# Patient Record
Sex: FEMALE | Race: WHITE | NOT HISPANIC OR LATINO | Employment: OTHER | URBAN - METROPOLITAN AREA
[De-identification: names, ages, dates, MRNs, and addresses within clinical notes are randomized per-mention and may not be internally consistent; named-entity substitution may affect disease eponyms.]

---

## 2017-03-24 ENCOUNTER — TELEPHONE (OUTPATIENT)
Dept: ENDOCRINOLOGY | Facility: CLINIC | Age: 77
End: 2017-03-24

## 2017-03-24 NOTE — TELEPHONE ENCOUNTER
----- Message from Alicia Durand sent at 3/24/2017  2:32 PM CDT -----  Contact: Self  Good afternoon,     Pt is requesting an appt due to annual check for June (first available 08/28/17).    Pt can be reached at [phone].    Thank you!

## 2017-06-20 ENCOUNTER — HOSPITAL ENCOUNTER (OUTPATIENT)
Dept: RADIOLOGY | Facility: HOSPITAL | Age: 77
Discharge: HOME | End: 2017-06-20
Attending: INTERNAL MEDICINE
Payer: MEDICARE

## 2017-06-20 DIAGNOSIS — R94.6 ABNORMAL THYROID FUNCTION TEST: ICD-10-CM

## 2017-06-20 DIAGNOSIS — E04.2 MULTIPLE THYROID NODULES: ICD-10-CM

## 2017-06-20 DIAGNOSIS — E87.1 HYPONATREMIA: ICD-10-CM

## 2017-06-20 PROCEDURE — 76536 US EXAM OF HEAD AND NECK: CPT | Mod: 26,,, | Performed by: RADIOLOGY

## 2017-06-20 PROCEDURE — 76536 US EXAM OF HEAD AND NECK: CPT | Mod: TC,PO

## 2017-06-22 ENCOUNTER — OFFICE VISIT (OUTPATIENT)
Dept: ENDOCRINOLOGY | Facility: CLINIC | Age: 77
End: 2017-06-22
Payer: MEDICARE

## 2017-06-22 VITALS
DIASTOLIC BLOOD PRESSURE: 72 MMHG | WEIGHT: 107.06 LBS | SYSTOLIC BLOOD PRESSURE: 116 MMHG | BODY MASS INDEX: 20.9 KG/M2 | HEART RATE: 88 BPM | RESPIRATION RATE: 18 BRPM

## 2017-06-22 DIAGNOSIS — E04.2 MULTIPLE THYROID NODULES: Primary | ICD-10-CM

## 2017-06-22 DIAGNOSIS — E87.1 HYPONATREMIA: ICD-10-CM

## 2017-06-22 PROCEDURE — 99213 OFFICE O/P EST LOW 20 MIN: CPT | Mod: S$PBB,,, | Performed by: INTERNAL MEDICINE

## 2017-06-22 PROCEDURE — 1159F MED LIST DOCD IN RCRD: CPT | Mod: ,,, | Performed by: INTERNAL MEDICINE

## 2017-06-22 PROCEDURE — 1126F AMNT PAIN NOTED NONE PRSNT: CPT | Mod: ,,, | Performed by: INTERNAL MEDICINE

## 2017-06-22 PROCEDURE — 99999 PR PBB SHADOW E&M-EST. PATIENT-LVL IV: CPT | Mod: PBBFAC,,, | Performed by: INTERNAL MEDICINE

## 2017-06-22 PROCEDURE — 99214 OFFICE O/P EST MOD 30 MIN: CPT | Mod: PBBFAC,PO | Performed by: INTERNAL MEDICINE

## 2017-06-22 RX ORDER — TIZANIDINE 4 MG/1
4 TABLET ORAL 3 TIMES DAILY PRN
Refills: 1 | COMMUNITY
Start: 2017-05-31 | End: 2018-06-28

## 2017-06-22 RX ORDER — HYDROCODONE BITARTRATE AND ACETAMINOPHEN 10; 325 MG/1; MG/1
1 TABLET ORAL 4 TIMES DAILY
Refills: 0 | COMMUNITY
Start: 2017-05-14 | End: 2018-06-28

## 2017-06-22 RX ORDER — TRAZODONE HYDROCHLORIDE 50 MG/1
100 TABLET ORAL NIGHTLY
Refills: 5 | COMMUNITY
Start: 2017-05-28 | End: 2021-01-04 | Stop reason: SDUPTHER

## 2017-06-22 RX ORDER — GABAPENTIN 300 MG/1
300 CAPSULE ORAL 3 TIMES DAILY
Refills: 5 | COMMUNITY
Start: 2017-05-27 | End: 2018-06-28

## 2017-06-27 ENCOUNTER — OFFICE VISIT (OUTPATIENT)
Dept: HEMATOLOGY/ONCOLOGY | Facility: CLINIC | Age: 77
End: 2017-06-27
Payer: MEDICARE

## 2017-06-27 VITALS
BODY MASS INDEX: 20.65 KG/M2 | HEART RATE: 81 BPM | SYSTOLIC BLOOD PRESSURE: 159 MMHG | DIASTOLIC BLOOD PRESSURE: 71 MMHG | HEIGHT: 60 IN | WEIGHT: 105.19 LBS | TEMPERATURE: 98 F | RESPIRATION RATE: 16 BRPM

## 2017-06-27 DIAGNOSIS — C85.80 MARGINAL ZONE B-CELL LYMPHOMA: Primary | ICD-10-CM

## 2017-06-27 PROCEDURE — 1125F AMNT PAIN NOTED PAIN PRSNT: CPT | Mod: ,,, | Performed by: NURSE PRACTITIONER

## 2017-06-27 PROCEDURE — 99999 PR PBB SHADOW E&M-EST. PATIENT-LVL IV: CPT | Mod: PBBFAC,,, | Performed by: NURSE PRACTITIONER

## 2017-06-27 PROCEDURE — 99213 OFFICE O/P EST LOW 20 MIN: CPT | Mod: S$PBB,,, | Performed by: NURSE PRACTITIONER

## 2017-06-27 PROCEDURE — 99214 OFFICE O/P EST MOD 30 MIN: CPT | Mod: PBBFAC,PN | Performed by: NURSE PRACTITIONER

## 2017-06-27 PROCEDURE — 1159F MED LIST DOCD IN RCRD: CPT | Mod: ,,, | Performed by: NURSE PRACTITIONER

## 2017-06-27 NOTE — PROGRESS NOTES
HISTORY OF PRESENT ILLNESS:  This is a 77-year-old white female known to   Dr. 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  She was living in [place]   at that time and received six cycles of Treanda/Rituxan, that was followed by   maintenance Rituxan.  She achieved complete response and has remained DEION.    She presents to the clinic today for her annual evaluation.  She denies any difficulties   with fevers, chills, drenching night sweats, painful lymphadenopathy, unexplained   weight loss, abdominal discomfort, nausea, vomiting, constipation, diarrhea,   fatigue, weakness, rashes, pruritus, bruising, bleeding, myalgias, confusion,   headache, fatigue, etc.  No other new complaints or pertinent findings on a 14-point   review of systems.    PHYSICAL EXAMINATION:  GENERAL:  Well-developed, well-nourished white female in no acute distress.    Alert and oriented x4.  VITAL SIGNS:  Weight 105.1 pounds, stable, /75, pulse 81, respirations 16, temperature 98.4.   HEENT:  Normocephalic, atraumatic.  Oral mucosa pink and moist.  Lips without   lesions.  Tongue midline.  Oropharynx clear.  Nonicteric sclerae.   NECK:  Supple, no adenopathy.  No carotid bruits, thyromegaly or thyroid nodule.  HEART:  Regular rate and rhythm without murmur, gallop or rub.                LUNGS:  Clear to auscultation bilaterally.  Normal respiratory effort.       ABDOMEN:  Soft, nontender, nondistended with positive normoactive bowel sounds,   no hepatosplenomegaly.    EXTREMITIES:  No cyanosis, clubbing or edema.  Distal pulses are intact.          AXILLAE AND GROIN:  No palpable pathologic lymphadenopathy is appreciated.        SKIN:  Intact/turgor normal                                                       NEUROLOGIC:  Cranial nerves II-XII grossly intact.  Motor:  Good muscle bulk and   tone.  Strength/sensory 5/5 throughout.  Gait stable.      LABORATORY:    Lab Results   Component Value Date    WBC 4.75 06/20/2017    HGB 11.9 (L) 06/20/2017    HCT 34.7 (L) 06/20/2017    MCV 96 06/20/2017     06/20/2017     Unremarkable differential    CMP  Sodium   Date Value Ref Range Status   06/20/2017 132 (L) 136 - 145 mmol/L Final   06/20/2017 133 (L) 136 - 145 mmol/L Final     Potassium   Date Value Ref Range Status   06/20/2017 4.8 3.5 - 5.1 mmol/L Final   06/20/2017 4.6 3.5 - 5.1 mmol/L Final     Chloride   Date Value Ref Range Status   06/20/2017 98 95 - 110 mmol/L Final   06/20/2017 97 95 - 110 mmol/L Final     CO2   Date Value Ref Range Status   06/20/2017 26 23 - 29 mmol/L Final   06/20/2017 25 23 - 29 mmol/L Final     Glucose   Date Value Ref Range Status   06/20/2017 88 70 - 110 mg/dL Final   06/20/2017 88 70 - 110 mg/dL Final     BUN, Bld   Date Value Ref Range Status   06/20/2017 13 8 - 23 mg/dL Final   06/20/2017 14 8 - 23 mg/dL Final     Creatinine   Date Value Ref Range Status   06/20/2017 0.8 0.5 - 1.4 mg/dL Final   06/20/2017 0.8 0.5 - 1.4 mg/dL Final     Calcium   Date Value Ref Range Status   06/20/2017 9.6 8.7 - 10.5 mg/dL Final   06/20/2017 9.7 8.7 - 10.5 mg/dL Final     Total Protein   Date Value Ref Range Status   06/20/2017 6.3 6.0 - 8.4 g/dL Final   06/20/2017 6.5 6.0 - 8.4 g/dL Final     Albumin   Date Value Ref Range Status   06/20/2017 4.4 3.5 - 5.2 g/dL Final   06/20/2017 4.5 3.5 - 5.2 g/dL Final     Total Bilirubin   Date Value Ref Range Status   06/20/2017 0.5 0.1 - 1.0 mg/dL Final     Comment:     For infants and newborns, interpretation of results should be based  on gestational age, weight and in agreement with clinical  observations.  Premature Infant recommended reference ranges:  Up to 24 hours.............<8.0 mg/dL  Up to 48 hours............<12.0 mg/dL  3-5 days..................<15.0 mg/dL  6-29 days.................<15.0 mg/dL     06/20/2017 0.6 0.1 - 1.0 mg/dL Final     Comment:     For infants and newborns,  interpretation of results should be based  on gestational age, weight and in agreement with clinical  observations.  Premature Infant recommended reference ranges:  Up to 24 hours.............<8.0 mg/dL  Up to 48 hours............<12.0 mg/dL  3-5 days..................<15.0 mg/dL  6-29 days.................<15.0 mg/dL       Alkaline Phosphatase   Date Value Ref Range Status   06/20/2017 40 (L) 55 - 135 U/L Final   06/20/2017 39 (L) 55 - 135 U/L Final     AST   Date Value Ref Range Status   06/20/2017 16 10 - 40 U/L Final   06/20/2017 16 10 - 40 U/L Final     ALT   Date Value Ref Range Status   06/20/2017 13 10 - 44 U/L Final   06/20/2017 14 10 - 44 U/L Final     Anion Gap   Date Value Ref Range Status   06/20/2017 8 8 - 16 mmol/L Final   06/20/2017 11 8 - 16 mmol/L Final     eGFR if    Date Value Ref Range Status   06/20/2017 >60 >60 mL/min/1.73 m^2 Final   06/20/2017 >60 >60 mL/min/1.73 m^2 Final     eGFR if non    Date Value Ref Range Status   06/20/2017 >60 >60 mL/min/1.73 m^2 Final     Comment:     Calculation used to obtain the estimated glomerular filtration  rate (eGFR) is the CKD-EPI equation. Since race is unknown   in our information system, the eGFR values for   -American and Non--American patients are given   for each creatinine result.     06/20/2017 >60 >60 mL/min/1.73 m^2 Final     Comment:     Calculation used to obtain the estimated glomerular filtration  rate (eGFR) is the CKD-EPI equation. Since race is unknown   in our information system, the eGFR values for   -American and Non--American patients are given   for each creatinine result.       [id number] 2.2,     IMPRESSION:  1.  Marginal zone B-cell non-Hodgkin's lymphoma - DEION.  2.  Hyponatremia, chronic  3.  Osteopenia.  4.  Hemorrhage in left eye, followed by Dr. Horne..  5.  Thyroid nodule, followed by Dr. Torrez.    PLAN:  Return in one year with interval CBC, CMP, LDH  and beta-2 microglobulin prior to appointment.      Assessment/plan reviewed and approved by Dr. Fuller.

## 2018-03-02 ENCOUNTER — TELEPHONE (OUTPATIENT)
Dept: HEMATOLOGY/ONCOLOGY | Facility: CLINIC | Age: 78
End: 2018-03-02

## 2018-06-21 ENCOUNTER — HOSPITAL ENCOUNTER (OUTPATIENT)
Dept: RADIOLOGY | Facility: HOSPITAL | Age: 78
Discharge: HOME | End: 2018-06-21
Attending: INTERNAL MEDICINE
Payer: MEDICARE

## 2018-06-21 DIAGNOSIS — E87.1 HYPONATREMIA: ICD-10-CM

## 2018-06-21 DIAGNOSIS — E04.2 MULTIPLE THYROID NODULES: ICD-10-CM

## 2018-06-21 PROCEDURE — 76536 US EXAM OF HEAD AND NECK: CPT | Mod: 26,,, | Performed by: RADIOLOGY

## 2018-06-21 PROCEDURE — 76536 US EXAM OF HEAD AND NECK: CPT | Mod: TC,PO

## 2018-06-28 ENCOUNTER — OFFICE VISIT (OUTPATIENT)
Dept: HEMATOLOGY/ONCOLOGY | Facility: CLINIC | Age: 78
End: 2018-06-28
Payer: MEDICARE

## 2018-06-28 ENCOUNTER — OFFICE VISIT (OUTPATIENT)
Dept: ENDOCRINOLOGY | Facility: CLINIC | Age: 78
End: 2018-06-28
Payer: MEDICARE

## 2018-06-28 VITALS
BODY MASS INDEX: 20.65 KG/M2 | RESPIRATION RATE: 20 BRPM | SYSTOLIC BLOOD PRESSURE: 147 MMHG | WEIGHT: 105.19 LBS | DIASTOLIC BLOOD PRESSURE: 65 MMHG | HEART RATE: 82 BPM | HEIGHT: 60 IN | TEMPERATURE: 98 F

## 2018-06-28 VITALS
DIASTOLIC BLOOD PRESSURE: 64 MMHG | SYSTOLIC BLOOD PRESSURE: 108 MMHG | WEIGHT: 105.19 LBS | HEART RATE: 88 BPM | BODY MASS INDEX: 20.65 KG/M2 | HEIGHT: 60 IN

## 2018-06-28 DIAGNOSIS — E87.5 HYPERKALEMIA: ICD-10-CM

## 2018-06-28 DIAGNOSIS — E87.1 HYPONATREMIA: ICD-10-CM

## 2018-06-28 DIAGNOSIS — R94.6 ABNORMAL THYROID FUNCTION TEST: ICD-10-CM

## 2018-06-28 DIAGNOSIS — C85.80 MARGINAL ZONE B-CELL LYMPHOMA: Primary | ICD-10-CM

## 2018-06-28 DIAGNOSIS — E04.2 MULTINODULAR GOITER: Primary | ICD-10-CM

## 2018-06-28 PROCEDURE — 99214 OFFICE O/P EST MOD 30 MIN: CPT | Mod: S$PBB,,, | Performed by: INTERNAL MEDICINE

## 2018-06-28 PROCEDURE — 99213 OFFICE O/P EST LOW 20 MIN: CPT | Mod: PBBFAC,27,PO | Performed by: INTERNAL MEDICINE

## 2018-06-28 PROCEDURE — 99214 OFFICE O/P EST MOD 30 MIN: CPT | Mod: PBBFAC,PN | Performed by: NURSE PRACTITIONER

## 2018-06-28 PROCEDURE — 99213 OFFICE O/P EST LOW 20 MIN: CPT | Mod: S$PBB,,, | Performed by: NURSE PRACTITIONER

## 2018-06-28 PROCEDURE — 99999 PR PBB SHADOW E&M-EST. PATIENT-LVL III: CPT | Mod: PBBFAC,,, | Performed by: INTERNAL MEDICINE

## 2018-06-28 PROCEDURE — 99999 PR PBB SHADOW E&M-EST. PATIENT-LVL IV: CPT | Mod: PBBFAC,,, | Performed by: NURSE PRACTITIONER

## 2018-06-28 NOTE — PATIENT INSTRUCTIONS
Hyponatremia  Hyponatremia means low sodium levels in the blood. This condition most often occurs after prolonged vomiting or diarrhea, which causes your body to lose too much water and sodium. It can also result from drinking excess amounts of water or the use of diuretics (water pills).  Mild hyponatremia causes no symptoms. It is only discovered with a blood test. As sodium levels in the blood decreases, symptoms begin to appear. This includes weakness, confusion, muscle cramping and seizures.  Home care  · Reduce your daily water intake until the problem is corrected.  · If you have been taking diuretics, you may be asked to stop taking them for a short time.  · If you are having symptoms of weakness or confusion, do not drive or operate dangerous machinery until symptoms resolve.  Follow-up care  Follow up with your healthcare provider for a repeat blood test within the next week or as advised by our staff.  When to seek medical advice  Call your healthcare provider if any of the following occur:  · Increasing weakness  · Dizziness  · Irregular heartbeat, extra beats or very fast heart rate  · Increasing confusion  · Fainting or loss of consciousness  Date Last Reviewed: 7/26/2015  © 9793-0437 [employer]. [address] [place] [address]. All rights reserved. This information is not intended as a substitute for professional medical care. Always follow your healthcare professional's instructions.

## 2018-06-28 NOTE — PROGRESS NOTES
HISTORY OF PRESENT ILLNESS:  This is a 78-year-old white female known to   Dr. 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  She was living in [place]   at that time and received six cycles of Treanda/Rituxan, that was followed by   maintenance Rituxan.  She achieved complete response and has remained DEION.    She presents to the clinic today for her annual evaluation.  She reports that   Dr. Torrez is following her for hyponatremia and her thyroid condition.  She   was instructed to withhold from free water.  She reports recent nausea at night   not related to any spicy or fatty foods.  She denies any difficulties with fevers,   chills, drenching night sweats, painful lymphadenopathy, unexplained   weight loss, abdominal discomfort, vomiting, constipation, diarrhea,   fatigue, weakness, rashes, pruritus, bruising, bleeding, myalgias, confusion,   headache, fatigue, etc.  No other new complaints or pertinent findings on a   14-point review of systems.    PHYSICAL EXAMINATION:  GENERAL:  Well-developed, well-nourished white female in no acute distress.    Alert and oriented x4.  VITAL SIGNS:   Weight:  stable  Wt Readings from Last 3 Encounters:   06/28/18 47.7 kg (105 lb 2.6 oz)   06/28/18 47.7 kg (105 lb 3.2 oz)   01/23/18 48.6 kg (107 lb 3.2 oz)     Temp Readings from Last 3 Encounters:   06/28/18 97.7 °F (36.5 °C)   01/23/18 97.4 °F (36.3 °C) (Oral)   06/27/17 98.4 °F (36.9 °C)     BP Readings from Last 3 Encounters:   06/28/18 (!) 147/65   06/28/18 108/64   01/23/18 124/86     Pulse Readings from Last 3 Encounters:   06/28/18 82   06/28/18 88   01/23/18 85     HEENT:  Normocephalic, atraumatic.  Oral mucosa pink and moist.  Lips without   lesions.  Tongue midline.  Oropharynx clear.  Nonicteric sclerae.   NECK:  Supple, no adenopathy.  No carotid bruits, thyromegaly or thyroid nodule.  HEART:  Regular rate and rhythm without  murmur, gallop or rub.                LUNGS:  Clear to auscultation bilaterally.  Normal respiratory effort.       ABDOMEN:  Soft, nontender, nondistended with positive normoactive bowel sounds,   no hepatosplenomegaly.    EXTREMITIES:  No cyanosis, clubbing or edema.  Distal pulses are intact.          AXILLAE AND GROIN:  No palpable pathologic lymphadenopathy is appreciated.        SKIN:  Intact/turgor normal                                                       NEUROLOGIC:  Cranial nerves II-XII grossly intact.  Motor:  Good muscle bulk and   tone.  Strength/sensory 5/5 throughout.  Gait stable.     LABORATORY:    Lab Results   Component Value Date    WBC 6.14 06/21/2018    HGB 12.7 06/21/2018    HCT 36.3 (L) 06/21/2018    MCV 93 06/21/2018     (H) 06/21/2018     Unremarkable differential    CMP  Sodium   Date Value Ref Range Status   06/21/2018 129 (L) 136 - 145 mmol/L Final     Potassium   Date Value Ref Range Status   06/21/2018 5.2 (H) 3.5 - 5.1 mmol/L Final     Chloride   Date Value Ref Range Status   06/21/2018 95 95 - 110 mmol/L Final     CO2   Date Value Ref Range Status   06/21/2018 23 23 - 29 mmol/L Final     Glucose   Date Value Ref Range Status   06/21/2018 87 70 - 110 mg/dL Final     BUN, Bld   Date Value Ref Range Status   06/21/2018 19 8 - 23 mg/dL Final     Creatinine   Date Value Ref Range Status   06/21/2018 0.8 0.5 - 1.4 mg/dL Final     Calcium   Date Value Ref Range Status   06/21/2018 9.9 8.7 - 10.5 mg/dL Final     Total Protein   Date Value Ref Range Status   06/21/2018 6.7 6.0 - 8.4 g/dL Final     Albumin   Date Value Ref Range Status   06/21/2018 4.4 3.5 - 5.2 g/dL Final     Total Bilirubin   Date Value Ref Range Status   06/21/2018 0.8 0.1 - 1.0 mg/dL Final     Comment:     For infants and newborns, interpretation of results should be based  on gestational age, weight and in agreement with clinical  observations.  Premature Infant recommended reference ranges:  Up to 24  hours.............<8.0 mg/dL  Up to 48 hours............<12.0 mg/dL  3-5 days..................<15.0 mg/dL  6-29 days.................<15.0 mg/dL       Alkaline Phosphatase   Date Value Ref Range Status   06/21/2018 58 55 - 135 U/L Final     AST   Date Value Ref Range Status   06/21/2018 21 10 - 40 U/L Final     ALT   Date Value Ref Range Status   06/21/2018 25 10 - 44 U/L Final     Anion Gap   Date Value Ref Range Status   06/21/2018 11 8 - 16 mmol/L Final     eGFR if    Date Value Ref Range Status   06/21/2018 >60 >60 mL/min/1.73 m^2 Final     eGFR if non    Date Value Ref Range Status   06/21/2018 >60 >60 mL/min/1.73 m^2 Final     Comment:     Calculation used to obtain the estimated glomerular filtration  rate (eGFR) is the CKD-EPI equation.        [id number] 2.0,     IMPRESSION:  1.  Marginal zone B-cell non-Hodgkin's lymphoma - DEION.  2.  Hyponatremia, chronic  3.  Osteopenia.  4.  Hemorrhage in left eye, followed by Dr. Horne.  5.  Thyroid nodule, followed by Dr. Torrez.    PLAN:  Return in one year with interval CBC, CMP, LDH and beta-2 microglobulin prior to appointment.  Handout provided on Hyponatremia.    Assessment/plan reviewed and approved by Dr. Vargas.

## 2018-06-28 NOTE — PROGRESS NOTES
CHIEF COMPLAINT: THYROID NODULE  78 year old being seen as a f/u. Had FNA 3/15 on the left that was benign. No N/V. No supplements. No biotin. Overall feeling well.           PAST MEDICAL HISTORY/PAST SURGICAL HISTORY:  Reviewed in [facility]    SOCIAL HISTORY: No Tobacco. 1 glass of wine a day    FAMILY HISTORY:  No known thyroid disease or thyroid cancer. + DM. + Heart disease    MEDICATIONS/ALLERGIES: The patient's MedCard has been updated and reviewed.      ROS:   Constitutional: No recent significant weight change  Eyes: No recent visual changes  ENT: No dysphagia  Cardiovascular: Denies current anginal symptoms  Respiratory: Denies current respiratory difficulty  Gastrointestinal: Denies recent bowel disturbances  GenitoUrinary - No dysuria  Skin: No new skin rash  Neurologic: No focal neurologic complaints  Remainder ROS negative        PE:    GENERAL: Well developed, well nourished.  PSYCH:  appropriate mood and affect  EYES:  PERRL, EOM intact.  ENT: Nares patent, oropharynx clear, mucosa pink,   NECK: Supple, trachea midline, no palpable nodules  CHEST: Resp even and unlabored, CTA bilateral.  CARDIAC: RRR, S1, S2 heard, no murmurs, rubs, S3, or S4    Results for GALINDO NGO (MRN [id number]) as of 6/28/2018 09:21   Ref. Range 6/21/2018 08:40   Sodium Latest Ref Range: 136 - 145 mmol/L 129 (L)   Potassium Latest Ref Range: 3.5 - 5.1 mmol/L 5.2 (H)   Chloride Latest Ref Range: 95 - 110 mmol/L 95   CO2 Latest Ref Range: 23 - 29 mmol/L 23   Anion Gap Latest Ref Range: 8 - 16 mmol/L 11   BUN, Bld Latest Ref Range: 8 - 23 mg/dL 19   Creatinine Latest Ref Range: 0.5 - 1.4 mg/dL 0.8   eGFR if non African American Latest Ref Range: >60 mL/min/1.73 m^2 >60   eGFR if  Latest Ref Range: >60 mL/min/1.73 m^2 >60   Glucose Latest Ref Range: 70 - 110 mg/dL 87   Calcium Latest Ref Range: 8.7 - 10.5 mg/dL 9.9   Alkaline Phosphatase Latest Ref Range: 55 - 135 U/L 58   Total Protein Latest Ref Range: 6.0 - 8.4  g/dL 6.7   Albumin Latest Ref Range: 3.5 - 5.2 g/dL 4.4   Total Bilirubin Latest Ref Range: 0.1 - 1.0 mg/dL 0.8   AST Latest Ref Range: 10 - 40 U/L 21   ALT Latest Ref Range: 10 - 44 U/L 25   LD Latest Ref Range: 110 - 260 U/L 153   TSH Latest Ref Range: 0.400 - 4.000 uIU/mL 4.777 (H)   Free T4 Latest Ref Range: 0.71 - 1.51 ng/dL 0.85       US SOFT TISSUE HEAD NECK THYROID    CLINICAL HISTORY:  Nontoxic multinodular goiter    TECHNIQUE:  Ultrasound of the thyroid and cervical lymph nodes was performed.    COMPARISON:  Thyroid ultrasound dated 06/20/2017    FINDINGS:  The thyroid gland is somewhat small but with increased vascularity.  The right lobe measures 1.5 x 0.5 x 0.8 cm with an estimated volume of 0.3 mL.  The thyroid isthmus measures 1 mm in thickness.  The left lobe measures 3.5 x 1.3 x 1.5 cm with an estimated volume of 3.8 mL.  Total thyroid volume is 4.1 mL, no significant change compared to 3.8 mL on the prior study.    The right lobe of the thyroid gland is somewhat diffusely heterogeneous in echotexture without focal solid or cystic nodule.    As previously demonstrated, there are several nodules within the left lobe.  There is a 3 mm solid nodule in the mid to upper pole which is unchanged.  There is a 1.1 x 0.7 x 0.9 cm complex solid and cystic nodule with scattered microcalcifications lateral in the midpole.  This was present previously and is unchanged.  There is also a 6 x 5 x 6 mm solid nodule with microcalcifications in the mid to lower pole.  This was present previously as well.  Several small scattered 2 mm cysts are present in the lower pole.    There are normal appearing bilateral neck lymph nodes.   Impression       1. There is no detrimental change in the appearance of the thyroid gland compared to the prior study.  Thyroid volume is small.  Heterogeneous echotexture seen in the right lobe but there is no focal solid or cystic nodule.  This is a nonspecific appearance and can be seen with  a history of thyroiditis, Graves disease, etc.  There are several nodules within the left lobe of the gland but these are unchanged in size.  There is no pathologic lymphadenopathy.           ASSESSMENT/PLAN:  1. Thyroid Nodules- She has had a benign FNA. US shows no change.     2. Hyponatremia- Na decreased. WIll have her monitor fluid intake and repeat levels in 4 weeks. See labs below.     3. Elevated TSH- will repeat thyroid levels.     FOLLOWUP  4 weeks- CMP. TSH, Ft4, Uric acid, Osm, Urine Osm, Urine Na, 8 AM ACTH, Cortisol.  F/U 6 months with TSH, CMP

## 2018-07-26 ENCOUNTER — LAB VISIT (OUTPATIENT)
Dept: LAB | Facility: HOSPITAL | Age: 78
End: 2018-07-26
Attending: INTERNAL MEDICINE
Payer: MEDICARE

## 2018-07-26 DIAGNOSIS — E04.2 MULTINODULAR GOITER: ICD-10-CM

## 2018-07-26 DIAGNOSIS — E87.1 HYPONATREMIA: ICD-10-CM

## 2018-07-26 LAB
ALBUMIN SERPL BCP-MCNC: 4 G/DL
ALP SERPL-CCNC: 60 U/L
ALT SERPL W/O P-5'-P-CCNC: 13 U/L
ANION GAP SERPL CALC-SCNC: 7 MMOL/L
AST SERPL-CCNC: 17 U/L
BILIRUB SERPL-MCNC: 0.6 MG/DL
BUN SERPL-MCNC: 13 MG/DL
CALCIUM SERPL-MCNC: 9.9 MG/DL
CHLORIDE SERPL-SCNC: 101 MMOL/L
CO2 SERPL-SCNC: 28 MMOL/L
CORTIS SERPL-MCNC: 15.2 UG/DL
CREAT SERPL-MCNC: 0.8 MG/DL
EST. GFR  (AFRICAN AMERICAN): >60 ML/MIN/1.73 M^2
EST. GFR  (NON AFRICAN AMERICAN): >60 ML/MIN/1.73 M^2
GLUCOSE SERPL-MCNC: 102 MG/DL
OSMOLALITY SERPL: 282 MOSM/KG
POTASSIUM SERPL-SCNC: 4.3 MMOL/L
PROT SERPL-MCNC: 6.6 G/DL
SODIUM SERPL-SCNC: 136 MMOL/L
T4 FREE SERPL-MCNC: 0.83 NG/DL
TSH SERPL DL<=0.005 MIU/L-ACNC: 3.3 UIU/ML
URATE SERPL-MCNC: 3.3 MG/DL

## 2018-07-26 PROCEDURE — 84550 ASSAY OF BLOOD/URIC ACID: CPT

## 2018-07-26 PROCEDURE — 82024 ASSAY OF ACTH: CPT

## 2018-07-26 PROCEDURE — 82533 TOTAL CORTISOL: CPT

## 2018-07-26 PROCEDURE — 84443 ASSAY THYROID STIM HORMONE: CPT

## 2018-07-26 PROCEDURE — 36415 COLL VENOUS BLD VENIPUNCTURE: CPT | Mod: PO

## 2018-07-26 PROCEDURE — 83930 ASSAY OF BLOOD OSMOLALITY: CPT

## 2018-07-26 PROCEDURE — 80053 COMPREHEN METABOLIC PANEL: CPT

## 2018-07-26 PROCEDURE — 84439 ASSAY OF FREE THYROXINE: CPT

## 2018-07-27 ENCOUNTER — TELEPHONE (OUTPATIENT)
Dept: ENDOCRINOLOGY | Facility: CLINIC | Age: 78
End: 2018-07-27

## 2018-07-27 DIAGNOSIS — E87.1 HYPONATREMIA: Primary | ICD-10-CM

## 2018-07-27 LAB — ACTH PLAS-MCNC: 12 PG/ML

## 2018-07-27 NOTE — TELEPHONE ENCOUNTER
----- Message from Jamil Clark sent at 7/27/2018  9:48 AM CDT -----  Contact: same  Type:  Test Results    Who Called:  patient  Name of Test (Lab/Mammo/Etc):  labs  Date of Test:  7/26/18  Ordering Provider:  Shan  Where the test was performed:  [address]  Best Call Back Number:  [phone]  Additional Information:  n/a

## 2018-07-27 NOTE — TELEPHONE ENCOUNTER
Let her know her Na is normal now. Thyroid and cortisol normal so far as well.   Check CMP 2 months

## 2018-09-28 ENCOUNTER — LAB VISIT (OUTPATIENT)
Dept: LAB | Facility: HOSPITAL | Age: 78
End: 2018-09-28
Attending: INTERNAL MEDICINE
Payer: MEDICARE

## 2018-09-28 DIAGNOSIS — E87.1 HYPONATREMIA: ICD-10-CM

## 2018-09-28 LAB
ALBUMIN SERPL BCP-MCNC: 4.2 G/DL
ALP SERPL-CCNC: 59 U/L
ALT SERPL W/O P-5'-P-CCNC: 16 U/L
ANION GAP SERPL CALC-SCNC: 10 MMOL/L
AST SERPL-CCNC: 18 U/L
BILIRUB SERPL-MCNC: 0.6 MG/DL
BUN SERPL-MCNC: 12 MG/DL
CALCIUM SERPL-MCNC: 9.6 MG/DL
CHLORIDE SERPL-SCNC: 100 MMOL/L
CO2 SERPL-SCNC: 23 MMOL/L
CREAT SERPL-MCNC: 0.8 MG/DL
EST. GFR  (AFRICAN AMERICAN): >60 ML/MIN/1.73 M^2
EST. GFR  (NON AFRICAN AMERICAN): >60 ML/MIN/1.73 M^2
GLUCOSE SERPL-MCNC: 90 MG/DL
POTASSIUM SERPL-SCNC: 4.7 MMOL/L
PROT SERPL-MCNC: 6.6 G/DL
SODIUM SERPL-SCNC: 133 MMOL/L

## 2018-09-28 PROCEDURE — 36415 COLL VENOUS BLD VENIPUNCTURE: CPT | Mod: PO

## 2018-09-28 PROCEDURE — 80053 COMPREHEN METABOLIC PANEL: CPT

## 2018-09-29 ENCOUNTER — PATIENT MESSAGE (OUTPATIENT)
Dept: ENDOCRINOLOGY | Facility: CLINIC | Age: 78
End: 2018-09-29

## 2019-01-07 ENCOUNTER — TELEPHONE (OUTPATIENT)
Dept: ENDOCRINOLOGY | Facility: CLINIC | Age: 79
End: 2019-01-07

## 2019-01-07 DIAGNOSIS — E04.1 THYROID NODULE: Primary | ICD-10-CM

## 2019-01-07 NOTE — TELEPHONE ENCOUNTER
Spoke with pt, scheduled annual follow up with labs and u/s prior, pt aware of date times and locations

## 2019-01-07 NOTE — TELEPHONE ENCOUNTER
----- Message from Emilia Hopkins sent at 1/7/2019 11:22 AM CST -----  Contact: pt  Calling to schedule appointment and be worked in to the schedule for her yearly visit in June and please advise. [phone]

## 2019-03-04 PROBLEM — K21.9 GERD WITHOUT ESOPHAGITIS: Status: ACTIVE | Noted: 2019-03-04

## 2019-03-04 PROBLEM — Z85.72 HISTORY OF NON-HODGKIN'S LYMPHOMA: Status: ACTIVE | Noted: 2019-03-04

## 2019-04-04 NOTE — TELEPHONE ENCOUNTER
Received email message given to me by Nae Kwan to call patient.  Called patient, discussed upcoming June 2018 scheduled, rescheduled June 27,2018 follow up with Jacob De La O NP to June 28, 2018 at 11:20 am. Patient voiced understanding and appreciation.   DISPLAY PLAN FREE TEXT

## 2019-06-24 ENCOUNTER — HOSPITAL ENCOUNTER (OUTPATIENT)
Dept: RADIOLOGY | Facility: HOSPITAL | Age: 79
Discharge: HOME | End: 2019-06-24
Attending: INTERNAL MEDICINE
Payer: MEDICARE

## 2019-06-24 DIAGNOSIS — E04.1 THYROID NODULE: ICD-10-CM

## 2019-06-24 PROCEDURE — 76536 US EXAM OF HEAD AND NECK: CPT | Mod: 26,,, | Performed by: RADIOLOGY

## 2019-06-24 PROCEDURE — 76536 US EXAM OF HEAD AND NECK: CPT | Mod: TC,PO

## 2019-06-24 PROCEDURE — 76536 US SOFT TISSUE HEAD NECK THYROID: ICD-10-PCS | Mod: 26,,, | Performed by: RADIOLOGY

## 2019-06-28 ENCOUNTER — OFFICE VISIT (OUTPATIENT)
Dept: HEMATOLOGY/ONCOLOGY | Facility: CLINIC | Age: 79
End: 2019-06-28
Payer: MEDICARE

## 2019-06-28 VITALS
HEIGHT: 60 IN | OXYGEN SATURATION: 98 % | WEIGHT: 99 LBS | RESPIRATION RATE: 14 BRPM | TEMPERATURE: 98 F | HEART RATE: 72 BPM | BODY MASS INDEX: 19.44 KG/M2 | SYSTOLIC BLOOD PRESSURE: 140 MMHG | DIASTOLIC BLOOD PRESSURE: 82 MMHG

## 2019-06-28 DIAGNOSIS — Z85.72 HISTORY OF NON-HODGKIN'S LYMPHOMA: Primary | ICD-10-CM

## 2019-06-28 PROCEDURE — 99213 PR OFFICE/OUTPT VISIT, EST, LEVL III, 20-29 MIN: ICD-10-PCS | Mod: S$PBB,,, | Performed by: NURSE PRACTITIONER

## 2019-06-28 PROCEDURE — 99999 PR PBB SHADOW E&M-EST. PATIENT-LVL IV: CPT | Mod: PBBFAC,,, | Performed by: NURSE PRACTITIONER

## 2019-06-28 PROCEDURE — 99213 OFFICE O/P EST LOW 20 MIN: CPT | Mod: S$PBB,,, | Performed by: NURSE PRACTITIONER

## 2019-06-28 PROCEDURE — 99214 OFFICE O/P EST MOD 30 MIN: CPT | Mod: PBBFAC,PN | Performed by: NURSE PRACTITIONER

## 2019-06-28 PROCEDURE — 99999 PR PBB SHADOW E&M-EST. PATIENT-LVL IV: ICD-10-PCS | Mod: PBBFAC,,, | Performed by: NURSE PRACTITIONER

## 2019-06-28 NOTE — PROGRESS NOTES
HISTORY OF PRESENT ILLNESS:  This is a 79-year-old white female known to   Dr. 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  She was living in [place]   at that time and received six cycles of Treanda/Rituxan, that was followed by   maintenance Rituxan.  She achieved complete response and has remained DEION.      She presents to the clinic today for her annual evaluation.  She continues to   withhold from free water in regards to hyponatremia diagnosis.  She reports   a bad stomach virus in April that resulted in nausea, diarrhea and weight loss.  She followed Dr. Horne and is finally starting to put weight back on.  She continues   to care for her  who suffers with dementia.  He is ambulatory.  She denies   any difficulties with fevers, chills, drenching night sweats, painful lymphadenopathy,   unexplained weight loss, abdominal discomfort, vomiting, constipation, diarrhea,   fatigue, weakness, rashes, pruritus, bruising, bleeding, myalgias, confusion,   headache, fatigue, etc.  No other new complaints or pertinent findings on a   14-point review of systems.    PHYSICAL EXAMINATION:  GENERAL:  Well-developed, well-nourished white female in no acute distress.    Alert and oriented x4.  VITAL SIGNS:   Weight:  Loss of 6 pounds in 1 year related to stomach virus  Wt Readings from Last 3 Encounters:   06/28/19 44.9 kg (98 lb 15.8 oz)   04/24/19 47.2 kg (104 lb)   04/10/19 47.2 kg (104 lb)     Temp Readings from Last 3 Encounters:   06/28/19 98.1 °F (36.7 °C)   03/25/19 97.4 °F (36.3 °C) (Oral)   03/04/19 97.4 °F (36.3 °C) (Oral)     BP Readings from Last 3 Encounters:   06/28/19 (!) 140/82   03/25/19 (!) 158/90   03/04/19 128/84     Pulse Readings from Last 3 Encounters:   06/28/19 72   03/25/19 66   03/04/19 68     HEENT:  Normocephalic, atraumatic.  Oral mucosa pink and moist.  Lips without   lesions.  Tongue midline.   Oropharynx clear.  Nonicteric sclerae.   NECK:  Supple, no adenopathy.  No carotid bruits, thyromegaly or thyroid nodule.  HEART:  Regular rate and rhythm without murmur, gallop or rub.                LUNGS:  Clear to auscultation bilaterally.  Normal respiratory effort.       ABDOMEN:  Soft, nontender, nondistended with positive normoactive bowel sounds,   no hepatosplenomegaly.    EXTREMITIES:  No cyanosis, clubbing or edema.  Distal pulses are intact.          AXILLAE AND GROIN:  No palpable pathologic lymphadenopathy is appreciated.        SKIN:  Intact/turgor normal                                                       NEUROLOGIC:  Cranial nerves II-XII grossly intact.  Motor:  Good muscle bulk and   tone.  Strength/sensory 5/5 throughout.  Gait stable.     LABORATORY:    Lab Results   Component Value Date    WBC 6.08 06/24/2019    HGB 11.1 (L) 06/24/2019    HCT 32.7 (L) 06/24/2019    MCV 93 06/24/2019     (H) 06/24/2019     Differential remarkable for 75.4% grans, 9.9% lymph    CMP  Sodium   Date Value Ref Range Status   06/24/2019 129 (L) 136 - 145 mmol/L Final   06/24/2019 129 (L) 136 - 145 mmol/L Final     Potassium   Date Value Ref Range Status   06/24/2019 4.2 3.5 - 5.1 mmol/L Final   06/24/2019 4.2 3.5 - 5.1 mmol/L Final     Chloride   Date Value Ref Range Status   06/24/2019 93 (L) 95 - 110 mmol/L Final   06/24/2019 93 (L) 95 - 110 mmol/L Final     CO2   Date Value Ref Range Status   06/24/2019 28 23 - 29 mmol/L Final   06/24/2019 28 23 - 29 mmol/L Final     Glucose   Date Value Ref Range Status   06/24/2019 98 70 - 110 mg/dL Final   06/24/2019 98 70 - 110 mg/dL Final     BUN, Bld   Date Value Ref Range Status   06/24/2019 17 8 - 23 mg/dL Final   06/24/2019 17 8 - 23 mg/dL Final     Creatinine   Date Value Ref Range Status   06/24/2019 0.8 0.5 - 1.4 mg/dL Final   06/24/2019 0.8 0.5 - 1.4 mg/dL Final     Calcium   Date Value Ref Range Status   06/24/2019 10.1 8.7 - 10.5 mg/dL Final   06/24/2019  10.1 8.7 - 10.5 mg/dL Final     Total Protein   Date Value Ref Range Status   06/24/2019 6.3 6.0 - 8.4 g/dL Final   06/24/2019 6.3 6.0 - 8.4 g/dL Final     Albumin   Date Value Ref Range Status   06/24/2019 4.0 3.5 - 5.2 g/dL Final   06/24/2019 4.0 3.5 - 5.2 g/dL Final     Total Bilirubin   Date Value Ref Range Status   06/24/2019 0.5 0.1 - 1.0 mg/dL Final     Comment:     For infants and newborns, interpretation of results should be based  on gestational age, weight and in agreement with clinical  observations.  Premature Infant recommended reference ranges:  Up to 24 hours.............<8.0 mg/dL  Up to 48 hours............<12.0 mg/dL  3-5 days..................<15.0 mg/dL  6-29 days.................<15.0 mg/dL     06/24/2019 0.5 0.1 - 1.0 mg/dL Final     Comment:     For infants and newborns, interpretation of results should be based  on gestational age, weight and in agreement with clinical  observations.  Premature Infant recommended reference ranges:  Up to 24 hours.............<8.0 mg/dL  Up to 48 hours............<12.0 mg/dL  3-5 days..................<15.0 mg/dL  6-29 days.................<15.0 mg/dL       Alkaline Phosphatase   Date Value Ref Range Status   06/24/2019 58 55 - 135 U/L Final   06/24/2019 58 55 - 135 U/L Final     AST   Date Value Ref Range Status   06/24/2019 16 10 - 40 U/L Final   06/24/2019 16 10 - 40 U/L Final     ALT   Date Value Ref Range Status   06/24/2019 16 10 - 44 U/L Final   06/24/2019 16 10 - 44 U/L Final     Anion Gap   Date Value Ref Range Status   06/24/2019 8 8 - 16 mmol/L Final   06/24/2019 8 8 - 16 mmol/L Final     eGFR if    Date Value Ref Range Status   06/24/2019 >60 >60 mL/min/1.73 m^2 Final   06/24/2019 >60 >60 mL/min/1.73 m^2 Final     eGFR if non    Date Value Ref Range Status   06/24/2019 >60 >60 mL/min/1.73 m^2 Final     Comment:     Calculation used to obtain the estimated glomerular filtration  rate (eGFR) is the CKD-EPI equation.       06/24/2019 >60 >60 mL/min/1.73 m^2 Final     Comment:     Calculation used to obtain the estimated glomerular filtration  rate (eGFR) is the CKD-EPI equation.        [id number] 2.2,     IMPRESSION:  1.  Marginal zone B-cell non-Hodgkin's lymphoma - DEION.  2.  Hyponatremia, chronic  3.  Osteopenia.  4.  Hemorrhage in left eye, followed by Dr. Horne.  5.  Thyroid nodule, followed by Dr. Torrez.  6.  Chronic anemia - asymptomatic    PLAN:  1.  Return in one year with interval CBC, CMP, LDH and beta-2 microglobulin prior to appointment.  2.  Follow up with Dr. Torrez - 07/01/19    Assessment/plan reviewed and approved by Dr. Fuller.

## 2019-07-01 ENCOUNTER — OFFICE VISIT (OUTPATIENT)
Dept: ENDOCRINOLOGY | Facility: CLINIC | Age: 79
End: 2019-07-01
Payer: MEDICARE

## 2019-07-01 VITALS
SYSTOLIC BLOOD PRESSURE: 108 MMHG | HEIGHT: 59 IN | DIASTOLIC BLOOD PRESSURE: 60 MMHG | BODY MASS INDEX: 19.96 KG/M2 | HEART RATE: 79 BPM | WEIGHT: 99 LBS

## 2019-07-01 DIAGNOSIS — E87.1 HYPONATREMIA: ICD-10-CM

## 2019-07-01 DIAGNOSIS — E87.1 HYPONATREMIA: Primary | ICD-10-CM

## 2019-07-01 DIAGNOSIS — E04.2 MULTINODULAR GOITER: Primary | ICD-10-CM

## 2019-07-01 DIAGNOSIS — I10 BENIGN ESSENTIAL HTN: ICD-10-CM

## 2019-07-01 PROCEDURE — 99214 PR OFFICE/OUTPT VISIT, EST, LEVL IV, 30-39 MIN: ICD-10-PCS | Mod: S$PBB,,, | Performed by: INTERNAL MEDICINE

## 2019-07-01 PROCEDURE — 99999 PR PBB SHADOW E&M-EST. PATIENT-LVL III: CPT | Mod: PBBFAC,,, | Performed by: INTERNAL MEDICINE

## 2019-07-01 PROCEDURE — 99213 OFFICE O/P EST LOW 20 MIN: CPT | Mod: PBBFAC,PO | Performed by: INTERNAL MEDICINE

## 2019-07-01 PROCEDURE — 99214 OFFICE O/P EST MOD 30 MIN: CPT | Mod: S$PBB,,, | Performed by: INTERNAL MEDICINE

## 2019-07-01 PROCEDURE — 99999 PR PBB SHADOW E&M-EST. PATIENT-LVL III: ICD-10-PCS | Mod: PBBFAC,,, | Performed by: INTERNAL MEDICINE

## 2019-07-01 RX ORDER — LOSARTAN POTASSIUM 50 MG/1
50 TABLET ORAL DAILY
Qty: 90 TABLET | Refills: 3 | Status: SHIPPED | OUTPATIENT
Start: 2019-07-01 | End: 2020-06-25

## 2019-07-01 NOTE — PROGRESS NOTES
CHIEF COMPLAINT: THYROID NODULE  79 year old being seen as a f/u. Had FNA 3/15 on the left that was benign. She is monitoring liquid intake- approx 1 liter. No N/V. No supplements. No biotin. Overall feeling well. On thiazide.           PAST MEDICAL HISTORY/PAST SURGICAL HISTORY:  Reviewed in [facility]    SOCIAL HISTORY: No Tobacco. 1 glass of wine a day    FAMILY HISTORY:  No known thyroid disease or thyroid cancer. + DM. + Heart disease    MEDICATIONS/ALLERGIES: The patient's MedCard has been updated and reviewed.      ROS:   Constitutional: No recent significant weight change  Eyes: No recent visual changes  ENT: No dysphagia  Cardiovascular: Denies current anginal symptoms  Respiratory: Denies current respiratory difficulty  Gastrointestinal: Denies recent bowel disturbances  GenitoUrinary - No dysuria  Skin: No new skin rash  Neurologic: No focal neurologic complaints  Remainder ROS negative        PE:    GENERAL: Well developed, well nourished.  ENT: Nares patent, oropharynx clear, mucosa pink,   NECK: Supple, trachea midline, no palpable nodules  CHEST: Resp even and unlabored, CTA bilateral.  CARDIAC: RRR, S1, S2 heard, no murmurs, rubs, S3, or S4    Results for GALINDO NGO (MRN [id number]) as of 7/1/2019 14:00   Ref. Range 6/24/2019 10:27   TSH Latest Ref Range: 0.400 - 4.000 uIU/mL 2.203     US SOFT TISSUE HEAD NECK THYROID    CLINICAL HISTORY:  Nontoxic single thyroid nodule    TECHNIQUE:  Ultrasound of the thyroid and cervical lymph nodes was performed.    COMPARISON:  06/21/2018    FINDINGS:  The right lobe of thyroid gland measures 1.8 x 0.7 x 0.5 cm in size.  The left lobe the thyroid gland measures 4.5 x 1.6 x 1.7cm in size.  This gives an approximate volume of on the right of 0.4cc and an approximate volume on the left of 6.4 cc for a total approximate volume of 6.8 cc.    A normal size node in the right neck is noted with normal donnell architecture of 1.0 x 0.4 x 0.5 cm.    A complex solid and  cystic nodule within the left lobe of the thyroid gland is noted of 1.1 x 1.11 x 0.8 cm without convincing detrimental change when comparison is made with the prior that is hypoechoic possibly with some coarse calcifications that appears solid and cystic but predominantly solid.  No worrisome detrimental changes noted since the prior.  A smaller well-circumscribed nodule is noted of 7 mm unchanged      Impression       1. Multiple thyroid nodules on the left without worrisome change since 06/21/2018.           ASSESSMENT/PLAN:  1. Thyroid Nodules- She has had a benign FNA. US shows no change.     2. Hyponatremia- Na decreased. Will stop thiazide diuretic.     3. Elevated TSH- TSH WNL    4. HTN- Will increase losartan to compensate for stopping thiazide. Will have her check BP at home since will be out of town a few weeks. Monitor K    FOLLOWUP  6 weeks- CMP, Osm, Uric acid, Urine Na, Urine Osm  F/U 6 months with TSH, CMP

## 2019-08-12 ENCOUNTER — LAB VISIT (OUTPATIENT)
Dept: LAB | Facility: HOSPITAL | Age: 79
End: 2019-08-12
Attending: INTERNAL MEDICINE
Payer: MEDICARE

## 2019-08-12 DIAGNOSIS — E87.1 HYPONATREMIA: ICD-10-CM

## 2019-08-12 DIAGNOSIS — E04.2 MULTINODULAR GOITER: ICD-10-CM

## 2019-08-12 LAB
ALBUMIN SERPL BCP-MCNC: 3.9 G/DL (ref 3.5–5.2)
ALP SERPL-CCNC: 61 U/L (ref 55–135)
ALT SERPL W/O P-5'-P-CCNC: 20 U/L (ref 10–44)
ANION GAP SERPL CALC-SCNC: 7 MMOL/L (ref 8–16)
AST SERPL-CCNC: 20 U/L (ref 10–40)
BILIRUB SERPL-MCNC: 0.5 MG/DL (ref 0.1–1)
BUN SERPL-MCNC: 19 MG/DL (ref 8–23)
CALCIUM SERPL-MCNC: 9.6 MG/DL (ref 8.7–10.5)
CHLORIDE SERPL-SCNC: 97 MMOL/L (ref 95–110)
CO2 SERPL-SCNC: 25 MMOL/L (ref 23–29)
CREAT SERPL-MCNC: 0.8 MG/DL (ref 0.5–1.4)
EST. GFR  (AFRICAN AMERICAN): >60 ML/MIN/1.73 M^2
EST. GFR  (NON AFRICAN AMERICAN): >60 ML/MIN/1.73 M^2
GLUCOSE SERPL-MCNC: 96 MG/DL (ref 70–110)
OSMOLALITY UR: 311 MOSM/KG (ref 50–1200)
POTASSIUM SERPL-SCNC: 5.2 MMOL/L (ref 3.5–5.1)
PROT SERPL-MCNC: 6.3 G/DL (ref 6–8.4)
SODIUM SERPL-SCNC: 129 MMOL/L (ref 136–145)
SODIUM UR-SCNC: 44 MMOL/L (ref 20–250)
URATE SERPL-MCNC: 4.4 MG/DL (ref 2.4–5.7)

## 2019-08-12 PROCEDURE — 36415 COLL VENOUS BLD VENIPUNCTURE: CPT | Mod: PO

## 2019-08-12 PROCEDURE — 83930 ASSAY OF BLOOD OSMOLALITY: CPT

## 2019-08-12 PROCEDURE — 83935 ASSAY OF URINE OSMOLALITY: CPT

## 2019-08-12 PROCEDURE — 84300 ASSAY OF URINE SODIUM: CPT

## 2019-08-12 PROCEDURE — 84550 ASSAY OF BLOOD/URIC ACID: CPT

## 2019-08-12 PROCEDURE — 80053 COMPREHEN METABOLIC PANEL: CPT

## 2019-08-13 LAB — OSMOLALITY SERPL: 278 MOSM/KG (ref 275–295)

## 2019-08-22 ENCOUNTER — TELEPHONE (OUTPATIENT)
Dept: ENDOCRINOLOGY | Facility: CLINIC | Age: 79
End: 2019-08-22

## 2019-08-22 DIAGNOSIS — E87.1 HYPONATREMIA: Primary | ICD-10-CM

## 2019-08-22 NOTE — TELEPHONE ENCOUNTER
Let her know her Na is still low. Can you see if she can increase her Na intake. Also can she keep track of her fluid intake and let us know how much fluids she drinks in a day. Sometimes too much fluid can reduce Na    Check CMP 2 weeks

## 2019-08-22 NOTE — TELEPHONE ENCOUNTER
Pt advised and verbalized understanding.  She will call in a couple of days to let us know approx how much fluids she takes in daily.  2 week labs scheduled.

## 2019-08-27 ENCOUNTER — TELEPHONE (OUTPATIENT)
Dept: ENDOCRINOLOGY | Facility: CLINIC | Age: 79
End: 2019-08-27

## 2019-08-27 DIAGNOSIS — E87.1 HYPONATREMIA: Primary | ICD-10-CM

## 2019-08-27 NOTE — TELEPHONE ENCOUNTER
----- Message from Shahana Grande sent at 8/27/2019  8:38 AM CDT -----  Contact: self  Type: Needs Medical Advice    Who Called:  Patient    Seun [phone]  Additional Information: Calling to report her fluids Per Dr--One litre plus a cup which is 8oz that's fluids through out the day.

## 2019-08-28 PROBLEM — Z87.39 HISTORY OF OSTEOPENIA: Status: ACTIVE | Noted: 2019-08-28

## 2019-08-28 PROBLEM — Z78.0 MENOPAUSE: Status: ACTIVE | Noted: 2019-08-28

## 2019-08-28 PROBLEM — M54.6 ACUTE RIGHT-SIDED THORACIC BACK PAIN: Status: ACTIVE | Noted: 2019-08-28

## 2019-08-28 PROBLEM — I10 MODERATE HYPERTENSION: Status: ACTIVE | Noted: 2019-08-28

## 2019-09-05 ENCOUNTER — LAB VISIT (OUTPATIENT)
Dept: LAB | Facility: HOSPITAL | Age: 79
End: 2019-09-05
Attending: INTERNAL MEDICINE
Payer: MEDICARE

## 2019-09-05 DIAGNOSIS — E87.1 HYPONATREMIA: ICD-10-CM

## 2019-09-05 LAB
ALBUMIN SERPL BCP-MCNC: 3.5 G/DL (ref 3.5–5.2)
ALP SERPL-CCNC: 55 U/L (ref 55–135)
ALT SERPL W/O P-5'-P-CCNC: 11 U/L (ref 10–44)
ANION GAP SERPL CALC-SCNC: 8 MMOL/L (ref 8–16)
AST SERPL-CCNC: 13 U/L (ref 10–40)
BILIRUB SERPL-MCNC: 0.2 MG/DL (ref 0.1–1)
BUN SERPL-MCNC: 14 MG/DL (ref 8–23)
CALCIUM SERPL-MCNC: 9.2 MG/DL (ref 8.7–10.5)
CHLORIDE SERPL-SCNC: 99 MMOL/L (ref 95–110)
CO2 SERPL-SCNC: 25 MMOL/L (ref 23–29)
CREAT SERPL-MCNC: 0.8 MG/DL (ref 0.5–1.4)
EST. GFR  (AFRICAN AMERICAN): >60 ML/MIN/1.73 M^2
EST. GFR  (NON AFRICAN AMERICAN): >60 ML/MIN/1.73 M^2
GLUCOSE SERPL-MCNC: 88 MG/DL (ref 70–110)
POTASSIUM SERPL-SCNC: 4.8 MMOL/L (ref 3.5–5.1)
PROT SERPL-MCNC: 5.9 G/DL (ref 6–8.4)
SODIUM SERPL-SCNC: 132 MMOL/L (ref 136–145)

## 2019-09-05 PROCEDURE — 36415 COLL VENOUS BLD VENIPUNCTURE: CPT | Mod: PO

## 2019-09-05 PROCEDURE — 80053 COMPREHEN METABOLIC PANEL: CPT

## 2019-09-08 ENCOUNTER — TELEPHONE (OUTPATIENT)
Dept: ENDOCRINOLOGY | Facility: CLINIC | Age: 79
End: 2019-09-08

## 2019-09-09 NOTE — TELEPHONE ENCOUNTER
----- Message from Azael Barrow sent at 9/9/2019  9:26 AM CDT -----  Type:  Patient Returning Call    Who Called: self   Who Left Message for Patient:  Does the patient know what this is regarding?:   Best Call Back Number:  [phone] Additional Information:

## 2019-10-02 ENCOUNTER — LAB VISIT (OUTPATIENT)
Dept: LAB | Facility: HOSPITAL | Age: 79
End: 2019-10-02
Attending: INTERNAL MEDICINE
Payer: MEDICARE

## 2019-10-02 DIAGNOSIS — E87.1 HYPONATREMIA: ICD-10-CM

## 2019-10-02 PROCEDURE — 80048 BASIC METABOLIC PNL TOTAL CA: CPT

## 2019-10-02 PROCEDURE — 36415 COLL VENOUS BLD VENIPUNCTURE: CPT | Mod: PO

## 2019-10-03 LAB
ANION GAP SERPL CALC-SCNC: 8 MMOL/L (ref 8–16)
BUN SERPL-MCNC: 22 MG/DL (ref 8–23)
CALCIUM SERPL-MCNC: 9.7 MG/DL (ref 8.7–10.5)
CHLORIDE SERPL-SCNC: 100 MMOL/L (ref 95–110)
CO2 SERPL-SCNC: 25 MMOL/L (ref 23–29)
CREAT SERPL-MCNC: 1 MG/DL (ref 0.5–1.4)
EST. GFR  (AFRICAN AMERICAN): >60 ML/MIN/1.73 M^2
EST. GFR  (NON AFRICAN AMERICAN): 53 ML/MIN/1.73 M^2
GLUCOSE SERPL-MCNC: 129 MG/DL (ref 70–110)
POTASSIUM SERPL-SCNC: 5 MMOL/L (ref 3.5–5.1)
SODIUM SERPL-SCNC: 133 MMOL/L (ref 136–145)

## 2019-10-17 ENCOUNTER — TELEPHONE (OUTPATIENT)
Dept: HEMATOLOGY/ONCOLOGY | Facility: CLINIC | Age: 79
End: 2019-10-17

## 2019-10-17 NOTE — TELEPHONE ENCOUNTER
scheduled as per patient.    ----- Message from Jacob De La O NP sent at 10/16/2019  4:11 PM CDT -----  Contact: same  Can you make her an appointment to talk about this?      ----- Message -----  From: Kendra Alexander RN  Sent: 10/16/2019   1:36 PM CDT  To: Jacob De La O NP     Could you call to speak with this patient?    Gabriella    ----- Message -----  From: Jamil Clark  Sent: 10/16/2019  12:35 PM CDT  To: Jairon James Staff (Hem/Onc)    Patient called in and stated her cancer has been in remission for over 10 years but believes she is having some of the same familiar symptoms.  Patient would like a call back to discuss this with Jacob.    Callback number is [phone]

## 2019-10-21 ENCOUNTER — TELEPHONE (OUTPATIENT)
Dept: ENDOCRINOLOGY | Facility: CLINIC | Age: 79
End: 2019-10-21

## 2019-10-22 ENCOUNTER — OFFICE VISIT (OUTPATIENT)
Dept: HEMATOLOGY/ONCOLOGY | Facility: CLINIC | Age: 79
End: 2019-10-22
Payer: MEDICARE

## 2019-10-22 ENCOUNTER — LAB VISIT (OUTPATIENT)
Dept: LAB | Facility: HOSPITAL | Age: 79
End: 2019-10-22
Attending: NURSE PRACTITIONER
Payer: MEDICARE

## 2019-10-22 VITALS
OXYGEN SATURATION: 96 % | TEMPERATURE: 98 F | HEART RATE: 78 BPM | BODY MASS INDEX: 18.62 KG/M2 | RESPIRATION RATE: 16 BRPM | SYSTOLIC BLOOD PRESSURE: 137 MMHG | HEIGHT: 60 IN | DIASTOLIC BLOOD PRESSURE: 78 MMHG | WEIGHT: 94.81 LBS

## 2019-10-22 DIAGNOSIS — Z85.72 HISTORY OF NON-HODGKIN'S LYMPHOMA: Primary | ICD-10-CM

## 2019-10-22 DIAGNOSIS — Z85.72 HISTORY OF NON-HODGKIN'S LYMPHOMA: ICD-10-CM

## 2019-10-22 LAB
ALBUMIN SERPL BCP-MCNC: 4.5 G/DL (ref 3.5–5.2)
ALP SERPL-CCNC: 56 U/L (ref 38–145)
ALT SERPL W/O P-5'-P-CCNC: 10 U/L (ref 10–44)
ANION GAP SERPL CALC-SCNC: 7 MMOL/L (ref 8–16)
AST SERPL-CCNC: 20 U/L (ref 14–36)
BASOPHILS # BLD AUTO: 0.09 K/UL (ref 0–0.2)
BASOPHILS NFR BLD: 1.2 % (ref 0–1.9)
BILIRUB SERPL-MCNC: 0.3 MG/DL (ref 0.2–1.3)
BUN SERPL-MCNC: 24 MG/DL (ref 7–18)
CALCIUM SERPL-MCNC: 9.7 MG/DL (ref 8.4–10.2)
CHLORIDE SERPL-SCNC: 102 MMOL/L (ref 95–110)
CO2 SERPL-SCNC: 25 MMOL/L (ref 22–31)
CREAT SERPL-MCNC: 0.87 MG/DL (ref 0.5–1.4)
DIFFERENTIAL METHOD: ABNORMAL
EOSINOPHIL # BLD AUTO: 0.2 K/UL (ref 0–0.5)
EOSINOPHIL NFR BLD: 2 % (ref 0–8)
ERYTHROCYTE [DISTWIDTH] IN BLOOD BY AUTOMATED COUNT: 14.2 % (ref 11.5–14.5)
EST. GFR  (AFRICAN AMERICAN): >60 ML/MIN/1.73 M^2
EST. GFR  (NON AFRICAN AMERICAN): >60 ML/MIN/1.73 M^2
GLUCOSE SERPL-MCNC: 90 MG/DL (ref 70–110)
HCT VFR BLD AUTO: 30.6 % (ref 37–48.5)
HGB BLD-MCNC: 9.7 G/DL (ref 12–16)
IMM GRANULOCYTES # BLD AUTO: 0.03 K/UL (ref 0–0.04)
IMM GRANULOCYTES NFR BLD AUTO: 0.4 % (ref 0–0.5)
LDH SERPL L TO P-CCNC: 335 U/L (ref 313–618)
LYMPHOCYTES # BLD AUTO: 1 K/UL (ref 1–4.8)
LYMPHOCYTES NFR BLD: 13.6 % (ref 18–48)
MCH RBC QN AUTO: 28.4 PG (ref 27–31)
MCHC RBC AUTO-ENTMCNC: 31.7 G/DL (ref 32–36)
MCV RBC AUTO: 90 FL (ref 82–98)
MONOCYTES # BLD AUTO: 0.6 K/UL (ref 0.3–1)
MONOCYTES NFR BLD: 8 % (ref 4–15)
NEUTROPHILS # BLD AUTO: 5.7 K/UL (ref 1.8–7.7)
NEUTROPHILS NFR BLD: 74.8 % (ref 38–73)
NRBC BLD-RTO: 0 /100 WBC
PLATELET # BLD AUTO: 406 K/UL (ref 150–350)
PMV BLD AUTO: 8.2 FL (ref 9.2–12.9)
POTASSIUM SERPL-SCNC: 5.1 MMOL/L (ref 3.5–5.1)
PROT SERPL-MCNC: 6.9 G/DL (ref 6–8.4)
RBC # BLD AUTO: 3.42 M/UL (ref 4–5.4)
SODIUM SERPL-SCNC: 134 MMOL/L (ref 136–145)
WBC # BLD AUTO: 7.62 K/UL (ref 3.9–12.7)

## 2019-10-22 PROCEDURE — 85025 COMPLETE CBC W/AUTO DIFF WBC: CPT

## 2019-10-22 PROCEDURE — 80053 COMPREHEN METABOLIC PANEL: CPT

## 2019-10-22 PROCEDURE — 85025 COMPLETE CBC W/AUTO DIFF WBC: CPT | Mod: PN

## 2019-10-22 PROCEDURE — 99214 OFFICE O/P EST MOD 30 MIN: CPT | Mod: PBBFAC,PN | Performed by: NURSE PRACTITIONER

## 2019-10-22 PROCEDURE — 83615 LACTATE (LD) (LDH) ENZYME: CPT | Mod: PN

## 2019-10-22 PROCEDURE — 99999 PR PBB SHADOW E&M-EST. PATIENT-LVL IV: ICD-10-PCS | Mod: PBBFAC,,, | Performed by: NURSE PRACTITIONER

## 2019-10-22 PROCEDURE — 80053 COMPREHEN METABOLIC PANEL: CPT | Mod: PN

## 2019-10-22 PROCEDURE — 99213 OFFICE O/P EST LOW 20 MIN: CPT | Mod: S$PBB,,, | Performed by: NURSE PRACTITIONER

## 2019-10-22 PROCEDURE — 83615 LACTATE (LD) (LDH) ENZYME: CPT

## 2019-10-22 PROCEDURE — 99213 PR OFFICE/OUTPT VISIT, EST, LEVL III, 20-29 MIN: ICD-10-PCS | Mod: S$PBB,,, | Performed by: NURSE PRACTITIONER

## 2019-10-22 PROCEDURE — 99999 PR PBB SHADOW E&M-EST. PATIENT-LVL IV: CPT | Mod: PBBFAC,,, | Performed by: NURSE PRACTITIONER

## 2019-10-22 PROCEDURE — 36415 COLL VENOUS BLD VENIPUNCTURE: CPT | Mod: PN

## 2019-10-22 PROCEDURE — 82232 ASSAY OF BETA-2 PROTEIN: CPT

## 2019-10-22 RX ORDER — GLUCOSAMINE/CHONDRO SU A 500-400 MG
1 TABLET ORAL 2 TIMES DAILY
COMMUNITY
End: 2021-01-04 | Stop reason: SDUPTHER

## 2019-10-22 NOTE — PROGRESS NOTES
HISTORY OF PRESENT ILLNESS:  This is a 79-year-old white female known to Dr. Fuller for Stage IV marginal zone B-cell non-Hodgkin'S lymphoma involving the bone marrow that was diagnosed in March 2009.  She was living in [place] at that time and received six cycles of Treanda/Rituxan, that was followed by maintenance Rituxan.  She achieved complete response and has remained DEION.  Her last clinic visit was 06/28/2019.    She presents to the clinic today earlier than scheduled with complaints of weight loss (no change in diet, eating) & night perspiration.  She is concerned as she had these vague symptoms on her initial diagnosis.  She continues to withhold from free water in regards to hyponatremia diagnosis and follows with Dr. Torrez.  She denies any difficulties with fevers, chills, painful lymphadenopathy, abdominal discomfort, vomiting, constipation, diarrhea, fatigue, weakness, rashes, pruritus, bruising, bleeding, myalgias, confusion, headache, etc.  No other new complaints or pertinent findings on a 14-point review of systems.    PHYSICAL EXAMINATION:  GENERAL:  Well-developed, well-nourished white female in no acute distress.  Alert and oriented x4.  VITAL SIGNS:   Weight:  Loss of 4 pounds in 4 months  Wt Readings from Last 3 Encounters:   10/22/19 43 kg (94 lb 12.8 oz)   08/28/19 44.9 kg (98 lb 14.4 oz)   07/01/19 44.9 kg (98 lb 15.8 oz)     Temp Readings from Last 3 Encounters:   10/22/19 98.2 °F (36.8 °C) (Oral)   08/28/19 98.1 °F (36.7 °C) (Oral)   06/28/19 98.1 °F (36.7 °C)     BP Readings from Last 3 Encounters:   10/22/19 137/78   08/28/19 126/86   07/01/19 108/60     Pulse Readings from Last 3 Encounters:   10/22/19 78   08/28/19 97   07/01/19 79     HEENT:  Normocephalic, atraumatic.  Oral mucosa pink and moist.  Lips without   lesions.  Tongue midline.  Oropharynx clear.  Nonicteric sclerae.   NECK:  Supple, no adenopathy.  No carotid bruits, thyromegaly or thyroid nodule.  HEART:  Regular  rate and rhythm without murmur, gallop or rub.                LUNGS:  Clear to auscultation bilaterally.  Normal respiratory effort.       ABDOMEN:  Soft, nontender, nondistended with positive normoactive bowel sounds,   no hepatosplenomegaly.    EXTREMITIES:  No cyanosis, clubbing or edema.  Distal pulses are intact.          AXILLAE AND GROIN:  No palpable pathologic lymphadenopathy is appreciated.        SKIN:  Intact/turgor normal                                                       NEUROLOGIC:  Cranial nerves II-XII grossly intact.  Motor:  Good muscle bulk and   tone.  Strength/sensory 5/5 throughout.  Gait stable.     LABORATORY:  To be drawn post visit:  CBC, CMP, LDH, BETA2    IMPRESSION:  1.  Marginal zone B-cell non-Hodgkin's lymphoma - symptomatic  2.  Hyponatremia, chronic - follows Dr. Torrez  3.  Osteopenia.  4.  Hemorrhage in left eye, followed by Dr. Horne.  5.  Thyroid nodule, followed by Dr. Torrez.  6.  Chronic anemia - asymptomatic     PLAN:  1.  Draw labs post visit:  CBC, CMP, LDH, [id number].  2.  Arrange for CT PET - follow up with Dr. Fuller for results.  3.  Discussion with Dr. Fuller:  No additional orders at present.    Assessment/plan reviewed and approved by Dr. Fuller.

## 2019-10-23 LAB — B2 MICROGLOB SERPL-MCNC: 2.8 UG/ML (ref 0–2.5)

## 2019-10-29 ENCOUNTER — HOSPITAL ENCOUNTER (OUTPATIENT)
Dept: RADIOLOGY | Facility: HOSPITAL | Age: 79
Discharge: HOME | End: 2019-10-29
Attending: NURSE PRACTITIONER
Payer: MEDICARE

## 2019-10-29 DIAGNOSIS — Z85.72 HISTORY OF NON-HODGKIN'S LYMPHOMA: ICD-10-CM

## 2019-10-29 PROCEDURE — A9552 F18 FDG: HCPCS | Mod: PO

## 2019-10-29 PROCEDURE — 78815 PET IMAGE W/CT SKULL-THIGH: CPT | Mod: 26,PS,, | Performed by: RADIOLOGY

## 2019-10-29 PROCEDURE — 78815 NM PET CT ROUTINE: ICD-10-PCS | Mod: 26,PS,, | Performed by: RADIOLOGY

## 2019-10-29 PROCEDURE — 78815 PET IMAGE W/CT SKULL-THIGH: CPT | Mod: TC,PO

## 2019-11-05 ENCOUNTER — OFFICE VISIT (OUTPATIENT)
Dept: HEMATOLOGY/ONCOLOGY | Facility: CLINIC | Age: 79
End: 2019-11-05
Payer: MEDICARE

## 2019-11-05 ENCOUNTER — LAB VISIT (OUTPATIENT)
Dept: LAB | Facility: HOSPITAL | Age: 79
End: 2019-11-05
Attending: INTERNAL MEDICINE
Payer: MEDICARE

## 2019-11-05 VITALS
HEIGHT: 60 IN | SYSTOLIC BLOOD PRESSURE: 139 MMHG | OXYGEN SATURATION: 98 % | TEMPERATURE: 98 F | WEIGHT: 95.25 LBS | RESPIRATION RATE: 16 BRPM | BODY MASS INDEX: 18.7 KG/M2 | DIASTOLIC BLOOD PRESSURE: 87 MMHG | HEART RATE: 117 BPM

## 2019-11-05 DIAGNOSIS — R61 NIGHT SWEATS: ICD-10-CM

## 2019-11-05 DIAGNOSIS — R63.4 UNEXPLAINED WEIGHT LOSS: ICD-10-CM

## 2019-11-05 DIAGNOSIS — D64.9 NORMOCYTIC ANEMIA: ICD-10-CM

## 2019-11-05 DIAGNOSIS — C85.80 MARGINAL ZONE B-CELL LYMPHOMA: Primary | ICD-10-CM

## 2019-11-05 DIAGNOSIS — C85.80 MARGINAL ZONE B-CELL LYMPHOMA: ICD-10-CM

## 2019-11-05 LAB
FERRITIN SERPL-MCNC: 6 NG/ML (ref 12–264)
FOLATE SERPL-MCNC: >20 NG/ML (ref 4–24)
HAPTOGLOB SERPL-MCNC: 168 MG/DL (ref 30–200)
IRON SATN MFR SERPL: 23 % (ref 20–50)
IRON SERPL-MCNC: 93 UG/DL (ref 30–160)
TOTAL IRON BINDING CAPACITY: 396 UG/DL (ref 265–497)
VIT B12 SERPL-MCNC: >1000 PG/ML (ref 210–950)

## 2019-11-05 PROCEDURE — 99214 PR OFFICE/OUTPT VISIT, EST, LEVL IV, 30-39 MIN: ICD-10-PCS | Mod: S$PBB,,, | Performed by: INTERNAL MEDICINE

## 2019-11-05 PROCEDURE — 82728 ASSAY OF FERRITIN: CPT | Mod: PN

## 2019-11-05 PROCEDURE — 82607 VITAMIN B-12: CPT

## 2019-11-05 PROCEDURE — 99999 PR PBB SHADOW E&M-EST. PATIENT-LVL V: ICD-10-PCS | Mod: PBBFAC,,, | Performed by: INTERNAL MEDICINE

## 2019-11-05 PROCEDURE — 82270 OCCULT BLOOD FECES: CPT | Mod: PBBFAC,PN | Performed by: INTERNAL MEDICINE

## 2019-11-05 PROCEDURE — 83010 ASSAY OF HAPTOGLOBIN QUANT: CPT

## 2019-11-05 PROCEDURE — 83540 ASSAY OF IRON: CPT

## 2019-11-05 PROCEDURE — 82746 ASSAY OF FOLIC ACID SERUM: CPT | Mod: PN

## 2019-11-05 PROCEDURE — 99215 OFFICE O/P EST HI 40 MIN: CPT | Mod: PBBFAC,PN | Performed by: INTERNAL MEDICINE

## 2019-11-05 PROCEDURE — 82728 ASSAY OF FERRITIN: CPT

## 2019-11-05 PROCEDURE — 99214 OFFICE O/P EST MOD 30 MIN: CPT | Mod: S$PBB,,, | Performed by: INTERNAL MEDICINE

## 2019-11-05 PROCEDURE — 83540 ASSAY OF IRON: CPT | Mod: PN

## 2019-11-05 PROCEDURE — 83010 ASSAY OF HAPTOGLOBIN QUANT: CPT | Mod: PN

## 2019-11-05 PROCEDURE — 82607 VITAMIN B-12: CPT | Mod: PN

## 2019-11-05 PROCEDURE — 99999 PR PBB SHADOW E&M-EST. PATIENT-LVL V: CPT | Mod: PBBFAC,,, | Performed by: INTERNAL MEDICINE

## 2019-11-05 PROCEDURE — 82746 ASSAY OF FOLIC ACID SERUM: CPT

## 2019-11-05 NOTE — PROGRESS NOTES
History of present illness:  The patient is an 79-year-old white female who relocated here from [place].  She was recently seen by Jacob De La O in order to establish care in our clinic.  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  She was diagnosed in 2009, treated with Treanda/Rituxan, attained remission, and completed 2 years of maintenance therapy with Rituxan.  She has remained without evidence of disease recurrence.  At the time of her clinic appointment she had complaints of a 10 lb weight loss over the summer which she could not explain.  She was also experiencing sweats.  She denies lymphadenopathy and pruritus.  She has not experience fever.  She denies any signs or symptoms of GI bleeding. No other complaints pertinent findings on a 14 point review of systems.  Patient returns to review results of interval lab and imaging for restaging.    Physical examination:  The patient is a well-developed, thin appearing, elderly, white female in no acute distress, who has a weight of 95.5 lb (increased by 1 lb).  VITAL SIGNS: Documented  and reviewed this visit.  HEENT: Normocephalic, atraumatic. Oral mucosa pink and moist. Lips without   lesions. Tongue midline. Oropharynx clear. Nonicteric sclerae.   NECK: Supple, no adenopathy. No carotid bruits, thyromegaly or thyroid nodule.   HEART: Regular rate and rhythm without murmur, gallop or rub.   LUNGS: Clear to auscultation bilaterally. Normal respiratory effort.   ABDOMEN: Soft, nontender, nondistended with positive normoactive bowel sounds,   no hepatosplenomegaly.   EXTREMITIES: No cyanosis, clubbing or edema. Distal pulses are intact.   AXILLAE AND GROIN: No palpable pathologic lymphadenopathy is appreciated.   SKIN: Intact/turgor normal   NEUROLOGIC: Cranial nerves II-XII grossly intact. Motor: Good muscle bulk and   tone. Strength/sensory 5/5 throughout. Gait stable.     Laboratory:  White  count 7.6, hemoglobin 9.7, hematocrit 30.6, MCV 90, RDW 14.2, platelets 406, absolute neutrophil count is 5700.  Sodium 134, potassium 5.1, chloride 102, CO2 25, BUN 24, creatinine 0.9, glucose 90, calcium 9.7, liver function test within normal limits, , GFR is greater than 60.  Beta 2 microglobulin 2.8.    CT PET:  No PET-CT imaging findings to suggest significant recurrent or residual neoplastic disease.    Impression:  1.  History of marginal zone B-cell non-Hodgkin's lymphoma.  2.  New onset normocytic anemia.  3.  Unexplained weight loss.  4.  Night sweats.    Plan:  1.  Obtain iron, TIBC, ferritin, soluble transferrin receptor, B12, folate, haptoglobin, and stool cards.  2.  Patient is to return to clinic to review results of workup at earliest convenience.  3.  If no specific cause for her anemia is noted on this noninvasive workup, I would recommend proceeding with bilateral bone marrow aspiration and biopsy to include flow and cytogenetic analysis in order to rule out recurrence of lymphoma.  4.  40 min of contact time spent counseling the patient regarding plan of care.  She voices understanding wishes to proceed as above.    This note was created using voice recognition software and may contain grammatical errors.

## 2019-11-07 LAB — STFR SERPL-MCNC: 3.2 MG/L (ref 1.8–4.6)

## 2019-11-20 ENCOUNTER — OFFICE VISIT (OUTPATIENT)
Dept: HEMATOLOGY/ONCOLOGY | Facility: CLINIC | Age: 79
End: 2019-11-20
Payer: MEDICARE

## 2019-11-20 VITALS
HEIGHT: 60 IN | HEART RATE: 122 BPM | TEMPERATURE: 98 F | WEIGHT: 89.06 LBS | BODY MASS INDEX: 17.49 KG/M2 | OXYGEN SATURATION: 99 % | SYSTOLIC BLOOD PRESSURE: 127 MMHG | DIASTOLIC BLOOD PRESSURE: 66 MMHG | RESPIRATION RATE: 16 BRPM

## 2019-11-20 DIAGNOSIS — R61 NIGHT SWEATS: ICD-10-CM

## 2019-11-20 DIAGNOSIS — R11.2 NON-INTRACTABLE VOMITING WITH NAUSEA, UNSPECIFIED VOMITING TYPE: ICD-10-CM

## 2019-11-20 DIAGNOSIS — R10.10 PAIN OF UPPER ABDOMEN: ICD-10-CM

## 2019-11-20 DIAGNOSIS — D64.9 NORMOCYTIC ANEMIA: ICD-10-CM

## 2019-11-20 DIAGNOSIS — R10.13 DYSPEPSIA: Primary | ICD-10-CM

## 2019-11-20 DIAGNOSIS — R63.4 UNEXPLAINED WEIGHT LOSS: ICD-10-CM

## 2019-11-20 DIAGNOSIS — C85.80 MARGINAL ZONE B-CELL LYMPHOMA: Primary | ICD-10-CM

## 2019-11-20 DIAGNOSIS — C85.80 MARGINAL ZONE B-CELL LYMPHOMA: ICD-10-CM

## 2019-11-20 LAB
CTP QC/QA: YES
FECAL OCCULT BLOOD, POC: NEGATIVE

## 2019-11-20 PROCEDURE — 1125F AMNT PAIN NOTED PAIN PRSNT: CPT | Mod: ,,, | Performed by: INTERNAL MEDICINE

## 2019-11-20 PROCEDURE — 1159F PR MEDICATION LIST DOCUMENTED IN MEDICAL RECORD: ICD-10-PCS | Mod: ,,, | Performed by: INTERNAL MEDICINE

## 2019-11-20 PROCEDURE — 1159F MED LIST DOCD IN RCRD: CPT | Mod: ,,, | Performed by: INTERNAL MEDICINE

## 2019-11-20 PROCEDURE — 99999 PR PBB SHADOW E&M-EST. PATIENT-LVL III: CPT | Mod: PBBFAC,,, | Performed by: INTERNAL MEDICINE

## 2019-11-20 PROCEDURE — 1125F PR PAIN SEVERITY QUANTIFIED, PAIN PRESENT: ICD-10-PCS | Mod: ,,, | Performed by: INTERNAL MEDICINE

## 2019-11-20 PROCEDURE — 99214 OFFICE O/P EST MOD 30 MIN: CPT | Mod: S$PBB,,, | Performed by: INTERNAL MEDICINE

## 2019-11-20 PROCEDURE — 99214 PR OFFICE/OUTPT VISIT, EST, LEVL IV, 30-39 MIN: ICD-10-PCS | Mod: S$PBB,,, | Performed by: INTERNAL MEDICINE

## 2019-11-20 PROCEDURE — 99999 PR PBB SHADOW E&M-EST. PATIENT-LVL III: ICD-10-PCS | Mod: PBBFAC,,, | Performed by: INTERNAL MEDICINE

## 2019-11-20 PROCEDURE — 99213 OFFICE O/P EST LOW 20 MIN: CPT | Mod: PBBFAC,PN | Performed by: INTERNAL MEDICINE

## 2019-11-20 RX ORDER — OMEPRAZOLE 40 MG/1
40 CAPSULE, DELAYED RELEASE ORAL DAILY
Qty: 90 CAPSULE | Refills: 3 | Status: SHIPPED | OUTPATIENT
Start: 2019-11-20 | End: 2021-01-04

## 2019-11-20 RX ORDER — PROCHLORPERAZINE MALEATE 10 MG
10 TABLET ORAL EVERY 6 HOURS PRN
Qty: 60 TABLET | Refills: 3 | Status: SHIPPED | OUTPATIENT
Start: 2019-11-20 | End: 2020-05-26 | Stop reason: SDUPTHER

## 2019-11-20 NOTE — PROGRESS NOTES
History of present illness:  The patient is an 79-year-old white female who relocated here from [place].  She was recently seen by Jacob De La O in order to establish care in our clinic.  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  She was diagnosed in 2009, treated with Treanda/Rituxan, attained remission, and completed 2 years of maintenance therapy with Rituxan.  She has remained without evidence of disease recurrence.  At the time of her clinic appointment she had complaints of a 10 lb weight loss over the summer which she could not explain.  She was also experiencing sweats.  She denies lymphadenopathy and pruritus.  She has not experience fever.  She denies any signs or symptoms of GI bleeding. Interval CT PET was negative.  Patient returns to clinic to review laboratory workup regarding worsening anemia.  She has new complaint of 2 week history of abdominal pain, nausea, emesis, and diminished oral intake, as well as continued weight loss.  No other complaints pertinent findings on a 14 point review of systems.  Patient returns to review results of interval lab and imaging for restaging.    Physical examination:  The patient is a well-developed, thin appearing, elderly, white female in no acute distress, who has a weight of 89 lb (increased by 6.5 lb).  VITAL SIGNS: Documented  and reviewed this visit.  HEENT: Normocephalic, atraumatic. Oral mucosa pink and moist. Lips without   lesions. Tongue midline. Oropharynx clear. Nonicteric sclerae.   NECK: Supple, no adenopathy. No carotid bruits, thyromegaly or thyroid nodule.   HEART: Regular rate and rhythm without murmur, gallop or rub.   LUNGS: Clear to auscultation bilaterally. Normal respiratory effort.   ABDOMEN: Soft, nontender, nondistended with positive normoactive bowel sounds,   no hepatosplenomegaly.   EXTREMITIES: No cyanosis, clubbing or edema. Distal pulses are intact.   AXILLAE  AND GROIN: No palpable pathologic lymphadenopathy is appreciated.   SKIN: Intact/turgor normal   NEUROLOGIC: Cranial nerves II-XII grossly intact. Motor: Good muscle bulk and   tone. Strength/sensory 5/5 throughout. Gait stable.     Laboratory:  Ferritin 6, serum iron 93, TIBC 396, percent saturated iron 23, soluble transferrin receptor 3.2.  Folate greater than 20, B12 greater than 1000.  Haptoglobin 168.  Stools for occult blood are negative x3.    Impression:  1.  History of marginal zone B-cell non-Hodgkin's lymphoma.  2.  New onset normocytic anemia.  3.  Unexplained weight loss.  4.  Night sweats.  5.  Abdominal pain in association with nausea emesis.    Plan:  1.  Start Prilosec 40 mg daily and Compazine 10 mg every 6 hr as needed for nausea emesis.  2.  Consult Dr. Soriano for EGD.  3.  Obtain bilateral bone marrow aspiration and biopsy with flow and cytogenetic analysis.  4.  Informed consent for bone marrow aspiration and biopsy was obtained during the course of today's office evaluation.  5.  Patient is to return to clinic to review results at earliest convenience and will have interval lab to include CBC, CMP, LDH, magnesium, beta 2 microglobulin in association with an appointment..    This note was created using voice recognition software and may contain grammatical errors.

## 2019-11-25 ENCOUNTER — OFFICE VISIT (OUTPATIENT)
Dept: GASTROENTEROLOGY | Facility: CLINIC | Age: 79
End: 2019-11-25
Payer: MEDICARE

## 2019-11-25 VITALS — BODY MASS INDEX: 17.57 KG/M2 | HEIGHT: 60 IN | RESPIRATION RATE: 19 BRPM | WEIGHT: 89.5 LBS

## 2019-11-25 DIAGNOSIS — Z85.72 HISTORY OF LYMPHOMA: ICD-10-CM

## 2019-11-25 DIAGNOSIS — R19.7 DIARRHEA, UNSPECIFIED TYPE: ICD-10-CM

## 2019-11-25 DIAGNOSIS — R10.13 DYSPEPSIA: Primary | ICD-10-CM

## 2019-11-25 DIAGNOSIS — R11.0 NAUSEA: ICD-10-CM

## 2019-11-25 DIAGNOSIS — R63.4 WEIGHT LOSS: ICD-10-CM

## 2019-11-25 DIAGNOSIS — R10.84 GENERALIZED ABDOMINAL PAIN: ICD-10-CM

## 2019-11-25 DIAGNOSIS — D50.9 IRON DEFICIENCY ANEMIA, UNSPECIFIED IRON DEFICIENCY ANEMIA TYPE: ICD-10-CM

## 2019-11-25 PROCEDURE — 1125F PR PAIN SEVERITY QUANTIFIED, PAIN PRESENT: ICD-10-PCS | Mod: ,,, | Performed by: NURSE PRACTITIONER

## 2019-11-25 PROCEDURE — 99214 OFFICE O/P EST MOD 30 MIN: CPT | Mod: S$PBB,,, | Performed by: NURSE PRACTITIONER

## 2019-11-25 PROCEDURE — 99214 OFFICE O/P EST MOD 30 MIN: CPT | Mod: PBBFAC,PO | Performed by: NURSE PRACTITIONER

## 2019-11-25 PROCEDURE — 1159F MED LIST DOCD IN RCRD: CPT | Mod: ,,, | Performed by: NURSE PRACTITIONER

## 2019-11-25 PROCEDURE — 99999 PR PBB SHADOW E&M-EST. PATIENT-LVL IV: CPT | Mod: PBBFAC,,, | Performed by: NURSE PRACTITIONER

## 2019-11-25 PROCEDURE — 99214 PR OFFICE/OUTPT VISIT, EST, LEVL IV, 30-39 MIN: ICD-10-PCS | Mod: S$PBB,,, | Performed by: NURSE PRACTITIONER

## 2019-11-25 PROCEDURE — 1125F AMNT PAIN NOTED PAIN PRSNT: CPT | Mod: ,,, | Performed by: NURSE PRACTITIONER

## 2019-11-25 PROCEDURE — 1159F PR MEDICATION LIST DOCUMENTED IN MEDICAL RECORD: ICD-10-PCS | Mod: ,,, | Performed by: NURSE PRACTITIONER

## 2019-11-25 PROCEDURE — 99999 PR PBB SHADOW E&M-EST. PATIENT-LVL IV: ICD-10-PCS | Mod: PBBFAC,,, | Performed by: NURSE PRACTITIONER

## 2019-11-25 RX ORDER — POLYETHYLENE GLYCOL 3350 17 G/17G
POWDER, FOR SOLUTION ORAL DAILY PRN
COMMUNITY
End: 2022-09-06

## 2019-11-25 RX ORDER — BUTYROSPERMUM PARKII(SHEA BUTTER), SIMMONDSIA CHINENSIS (JOJOBA) SEED OIL, ALOE BARBADENSIS LEAF EXTRACT .01; 1; 3.5 G/100G; G/100G; G/100G
250 LIQUID TOPICAL 2 TIMES DAILY
COMMUNITY
End: 2021-01-04

## 2019-11-25 NOTE — PROGRESS NOTES
Subjective:       Patient ID: Anabell Madrid is a 79 y.o. female Body mass index is 17.48 kg/m².    Chief Complaint: Abdominal Pain (nausea, anemia)    This patient is new to me.  Referring Provider: Dr. Guevara Fuller for dyspepsia & EGD.     Abdominal Pain   This is a new problem. Episode onset: started a few weeks ago (in 11/2019) The problem occurs constantly. The problem has been gradually improving. The pain is located in the generalized abdominal region. The quality of the pain is cramping. The abdominal pain does not radiate. Associated symptoms include diarrhea (started with abdominal cramping; bowel movements are 3 times a day of watery stool; denies recent antibiotic/hospitalizaion, foreign travel, ill contacts, or suspect food intake), nausea (occasional; improving since on compazine prn- helping; started medical marijuana orally BID in 9/2019 for neck pain), vomiting (emesis, improving since on compazine; emesis was of sputum and food; denies bright red blood or coffee ground emesis) and weight loss (lost 15 lbs since summer 2019 due to norco use and constipation she had with that and was not eating as much because she felt blaoted at that time). Pertinent negatives include no belching, constipation (has resolved since not taking norco daily, miralax prn- has not taken recently), dysuria, fever, flatus, frequency, hematochezia or melena. Associated symptoms comments: Anemia since ~2017 but worsened in 10/2019; having bone marrow biopsy next week with Dr. Fuller. Treatments tried: prilosec 40 mg once daily, probiotic bid. Her past medical history is significant for GERD (denies any recently). There is no history of Crohn's disease, gallstones, pancreatitis or ulcerative colitis.     Review of Systems   Constitutional: Positive for appetite change (trying gluten free diet; decreased; eating oatmeal brekafast, lunch soup, dinner eats meat and vegetables; decreased food intake), diaphoresis (night  sweats- seeing Dr. Fuller for this), fatigue and weight loss (lost 15 lbs since summer 2019 due to norco use and constipation she had with that and was not eating as much because she felt blaoted at that time). Negative for chills and fever.        Taking diclofenac BID & norco PRN- takes once every 2-3 days   HENT: Negative for sore throat and trouble swallowing.    Respiratory: Negative for cough, choking and shortness of breath.    Cardiovascular: Negative for chest pain.   Gastrointestinal: Positive for abdominal pain, diarrhea (started with abdominal cramping; bowel movements are 3 times a day of watery stool; denies recent antibiotic/hospitalizaion, foreign travel, ill contacts, or suspect food intake), nausea (occasional; improving since on compazine prn- helping; started medical marijuana orally BID in 9/2019 for neck pain) and vomiting (emesis, improving since on compazine; emesis was of sputum and food; denies bright red blood or coffee ground emesis). Negative for anal bleeding, blood in stool, constipation (has resolved since not taking norco daily, miralax prn- has not taken recently), flatus, hematochezia, melena and rectal pain.   Genitourinary: Negative for difficulty urinating, dysuria, flank pain and frequency.   Neurological: Negative for weakness.       No LMP recorded. Patient is postmenopausal.  Past Medical History:   Diagnosis Date    Anemia     Cancer 2009    non-hodkins lymphoma-remission 5 yrs in 4/14    Eye hemorrhage     12/2015    Osteoporosis     Sleep apnea     uses dental mouthpiece, no cpap/bipap     Past Surgical History:   Procedure Laterality Date    COLONOSCOPY  ~2004    normal findings per patient report    DILATION AND CURETTAGE OF UTERUS      TONSILLECTOMY       Family History   Problem Relation Age of Onset    Diabetes Mother     Heart failure Mother     Cancer Mother         lymphoma non-hodkins    Heart attack Father     Diabetes Brother     Cancer Maternal  Aunt         bladder cancer    Cancer Maternal Uncle     Diabetes Maternal Uncle     Diabetes Maternal Grandmother     Cancer Maternal Grandfather         prostate cancer    Colon cancer Neg Hx     Crohn's disease Neg Hx     Esophageal cancer Neg Hx     Stomach cancer Neg Hx     Ulcerative colitis Neg Hx      Wt Readings from Last 10 Encounters:   11/25/19 40.6 kg (89 lb 8.1 oz)   11/20/19 40.4 kg (89 lb 1.1 oz)   11/05/19 43.2 kg (95 lb 3.8 oz)   10/22/19 43 kg (94 lb 12.8 oz)   08/28/19 44.9 kg (98 lb 14.4 oz)   07/01/19 44.9 kg (98 lb 15.8 oz)   06/28/19 44.9 kg (98 lb 15.8 oz)   04/24/19 47.2 kg (104 lb)   04/10/19 47.2 kg (104 lb)   03/25/19 47.2 kg (104 lb)     Lab Results   Component Value Date    WBC 7.62 10/22/2019    HGB 9.7 (L) 10/22/2019    HCT 30.6 (L) 10/22/2019    MCV 90 10/22/2019     (H) 10/22/2019     Lab Results   Component Value Date    IRON 93 11/05/2019    TIBC 396 11/05/2019    FERRITIN 6 (L) 11/05/2019     CMP  Sodium   Date Value Ref Range Status   10/22/2019 134 (L) 136 - 145 mmol/L Final     Potassium   Date Value Ref Range Status   10/22/2019 5.1 3.5 - 5.1 mmol/L Final     Chloride   Date Value Ref Range Status   10/22/2019 102 95 - 110 mmol/L Final     CO2   Date Value Ref Range Status   10/22/2019 25 22 - 31 mmol/L Final     Glucose   Date Value Ref Range Status   10/22/2019 90 70 - 110 mg/dL Final     Comment:     The ADA recommends the following guidelines for fasting glucose:  Normal:       less than 100 mg/dL  Prediabetes:  100 mg/dL to 125 mg/dL  Diabetes:     126 mg/dL or higher       BUN, Bld   Date Value Ref Range Status   10/22/2019 24 (H) 7 - 18 mg/dL Final     Creatinine   Date Value Ref Range Status   10/22/2019 0.87 0.50 - 1.40 mg/dL Final     Calcium   Date Value Ref Range Status   10/22/2019 9.7 8.4 - 10.2 mg/dL Final     Total Protein   Date Value Ref Range Status   10/22/2019 6.9 6.0 - 8.4 g/dL Final     Albumin   Date Value Ref Range Status   10/22/2019  4.5 3.5 - 5.2 g/dL Final     Total Bilirubin   Date Value Ref Range Status   10/22/2019 0.3 0.2 - 1.3 mg/dL Final     Alkaline Phosphatase   Date Value Ref Range Status   10/22/2019 56 38 - 145 U/L Final     AST   Date Value Ref Range Status   10/22/2019 20 14 - 36 U/L Final     ALT   Date Value Ref Range Status   10/22/2019 10 10 - 44 U/L Final     Anion Gap   Date Value Ref Range Status   10/22/2019 7 (L) 8 - 16 mmol/L Final     eGFR if    Date Value Ref Range Status   10/22/2019 >60 >60 mL/min/1.73 m^2 Final     eGFR if non    Date Value Ref Range Status   10/22/2019 >60 >60 mL/min/1.73 m^2 Final     Comment:     Calculation used to obtain the estimated glomerular filtration  rate (eGFR) is the CKD-EPI equation.        Lab Results   Component Value Date    TSH 2.203 06/24/2019 11/20/19 stool occult x 3 negative    Reviewed prior medical records including radiology report of 10/29/19 PET/CT scan.    Objective:      Physical Exam   Constitutional: She is oriented to person, place, and time. She appears well-developed. No distress.   HENT:   Mouth/Throat: Oropharynx is clear and moist and mucous membranes are normal. No oral lesions. No oropharyngeal exudate.   Eyes: Pupils are equal, round, and reactive to light. Conjunctivae are normal. No scleral icterus.   Pulmonary/Chest: Effort normal and breath sounds normal. No respiratory distress. She has no wheezes.   Abdominal: Soft. Normal appearance and bowel sounds are normal. She exhibits no distension, no abdominal bruit and no mass. There is no tenderness. There is no rigidity, no rebound, no guarding, no tenderness at McBurney's point and negative Cohen's sign.   Neurological: She is alert and oriented to person, place, and time.   Skin: Skin is warm and dry. No rash noted. She is not diaphoretic. No erythema. No pallor.   Non-jaundiced   Psychiatric: She has a normal mood and affect. Her behavior is normal. Judgment and  thought content normal.   Nursing note and vitals reviewed.      Assessment:       1. Dyspepsia    2. Generalized abdominal pain    3. Nausea    4. Iron deficiency anemia, unspecified iron deficiency anemia type    5. History of lymphoma    6. Adult BMI <19 kg/sq m    7. Weight loss    8. Diarrhea, unspecified type        Plan:       Dyspepsia  -     CT Abdomen Pelvis With Contrast; Future; Expected date: 11/25/2019  - schedule EGD, discussed procedure with patient, including risks and benefits, patient verbalized understanding  - CONTINUE PRILOSEC 40 MG ONCE DAILY AS DIRECTED    Generalized abdominal pain  -     CT Abdomen Pelvis With Contrast; Future; Expected date: 11/25/2019  - schedule EGD, discussed procedure with patient, including risks and benefits, patient verbalized understanding  - schedule Colonoscopy, discussed procedure with the patient, including risks and benefits, patient verbalized understanding  - avoid/minimize use of NSAIDs- since they can cause GI upset, bleeding and/or ulcers. If NSAID must be taken, recommend take with food.    Nausea  -     CT Abdomen Pelvis With Contrast; Future; Expected date: 11/25/2019  - schedule EGD, discussed procedure with patient, including risks and benefits, patient verbalized understanding  - CONTINUE COMPAZINE AS DIRECTED PRN NAUSEA  - discussed with patient that a side effect of narcotic pain medications is nausea, advised patient to talk to provider who manages pain medication, patient verbalized understanding  - advised caution with use of medical marijuana since it may contribute to cannabinoid hyperemesis syndrome; Recommend follow-up with Provider who prescribes the medical marijuana for continued evaluation and management.    Iron deficiency anemia, unspecified iron deficiency anemia type  - schedule EGD, discussed procedure with patient, including risks and benefits, patient verbalized understanding  - schedule Colonoscopy, discussed procedure with the  patient, including risks and benefits, patient verbalized understanding  - discussed with patient the different ways that anemia occurs: blood loss (such as from the gi tract), the body is not making enough, or the body is breaking down the rbcs too quickly; recommend EGD and colonoscopy to further evaluate gi tract for possible blood loss and pending results of endoscopies, possible UGI with Small Bowel Follow Through/video capsule study  -follow-up with PCP and hematology, Dr. Fuller, for continued evaluation and management    History of lymphoma  -follow-up with hematology, Dr. Fuller, for continued evaluation and management    Adult BMI <19 kg/sq m & Weight loss  -     CT Abdomen Pelvis With Contrast; Future; Expected date: 11/25/2019  - schedule EGD, discussed procedure with patient, including risks and benefits, patient verbalized understanding  - schedule Colonoscopy, discussed procedure with the patient, including risks and benefits, patient verbalized understanding  - encouraged PO intake and daily calorie counts to ensure adequate nutrition is taken in, recommend at least 2,000 calories a day  - recommend nutritional drinks, such as Boost, Ensure or Glucerna, to supplement nutrition needs    Diarrhea, unspecified type  -     CT Abdomen Pelvis With Contrast; Future; Expected date: 11/25/2019  -     Creatinine, serum; Future; Expected date: 11/25/2019  -     Stool Exam-Ova,Cysts,Parasites; Future; Expected date: 11/25/2019  -     Fecal fat, qualitative; Future; Expected date: 11/25/2019  -     Giardia / Cryptosporidum, EIA; Future; Expected date: 11/25/2019  -     Occult blood x 1, stool; Future; Expected date: 11/25/2019  -     pH, stool; Future; Expected date: 11/25/2019  -     Rotavirus antigen, stool; Future; Expected date: 11/25/2019  -     WBC, Stool; Future; Expected date: 11/25/2019  -     Stool culture; Future; Expected date: 11/25/2019  -     Clostridium difficile EIA; Future; Expected date:  11/25/2019  -     Adenovirus Molecular Detection, PCR, Non-Blood Stool; Future; Expected date: 11/25/2019  -     Pancreatic elastase, fecal; Future; Expected date: 11/25/2019  -     IgA; Future; Expected date: 11/25/2019  -     Tissue transglutaminase, IgA; Future; Expected date: 11/25/2019  - schedule Colonoscopy, discussed procedure with the patient, including risks and benefits, patient verbalized understanding  - CONTINUE OTC PROBIOTIC AS DIRECTED  Recommend high fiber diet (20-30 grams of fiber daily)/OTC fiber supplements daily as directed, such as Benefiber.    Follow up in about 1 month (around 12/25/2019), or if symptoms worsen or fail to improve.    If no improvement in symptoms or symptoms worsen, call/follow-up at clinic or go to ER.

## 2019-11-25 NOTE — LETTER
December 3, 2019      Guevara Fuller MD  [address] [facility] [address] [place] [address]           [facility]  [address] [place] [address]  Phone: [phone]          Patient: Anabell Madrid   MR Number: [id number]   YOB: 1940   Date of Visit: 11/25/2019       Dear Dr. Guevara Fuller:    Thank you for referring Anabell Madrid to me for evaluation. Attached you will find relevant portions of my assessment and plan of care.    If you have questions, please do not hesitate to call me. I look forward to following Anabell Madrid along with you.    Sincerely,    Leti Thapa, [facility]    Enclosure  CC:  No Recipients    If you would like to receive this communication electronically, please contact [email or web address] or [phone] to request more information on [employer] access.    For providers and/or their staff who would like to refer a patient to [facility], please contact us through our one-stop-shop provider referral line, [facility] , at [phone].    If you feel you have received this communication in error or would no longer like to receive these types of communications, please e-mail [email or web address]

## 2019-11-25 NOTE — PATIENT INSTRUCTIONS
Anemia  Anemia is a condition that occurs when your body does not have enough healthy red blood cells (RBCs). RBCs are the parts of your blood that carry oxygen throughout your body. A protein called hemoglobin allows your RBCs to absorb and release oxygen. Without enough RBCs or hemoglobin, your body doesn't get enough oxygen. Symptoms of anemia may then occur.    What are the symptoms of anemia?  Some people with anemia have no symptoms. But most people have symptoms that range from mild to severe. These can include:  · Tiredness (fatigue)  · Weakness  · Pale skin  · Shortness of breath  · Dizziness or fainting  · Rapid heartbeat  · Trouble doing normal amounts of activity  · Jaundice (yellowing of your eyes, skin, or mouth; dark urine)  What causes anemia?  Anemia can occur when your body:  · Loses too much blood  · Does not make enough RBCs  · Destroys your RBCs at a faster rate than it can replace them  · Does not make a normal amount of hemoglobin in your RBCs  These problems can occur for many reasons, including:  · A condition that you are born with (congenital or inherited), such as sickle cell disease or thalassemia  · Heavy bleeding for any reason, including injury, surgery, childbirth, or even heavy menstrual periods  · Being low in certain nutrients, such as iron, folate, or vitamin B12, possibly from a poor diet or a condition like celiac disease or Crohn's disease  · Certain chronic conditions like diabetes, arthritis, or kidney disease  · Certain chronic infections like tuberculosis or HIV  · Exposure to certain medicines, such as those used for chemotherapy  There are different types of anemia. Your healthcare provider can tell you more about the type of anemia you have and what may have caused it.  How is anemia diagnosed?  To diagnose anemia, your healthcare provider orders blood tests. These can include:  · Complete blood cell count (CBC). This test measures the amounts of the different types  of blood cells.  · Blood smear. This test checks the size and shape of your blood cells. To do the test, a drop of your blood is viewed under a microscope. A stain is used to make the blood cells easier to see.  · Iron studies. These tests measure the amount of iron in your blood. Your body needs iron to make hemoglobin in your RBCs.  · Vitamin B12 and folate studies. These tests check for some of the components that help give RBCs a normal size and shape.  · Reticulocyte count. This test measures the amount of new RBCs that your bone marrow makes.  · Hemoglobin electrophoresis. This test checks for problems with your hemoglobin in RBCs.  How is anemia treated?  Treatment for anemia is based on the type of anemia, its cause, and the severity of your symptoms. Treatments may include:  · Diet changes. This involves increasing the amount of certain nutrients in your diet, such as iron, vitamin B12, or folate. Your healthcare provider may also prescribe nutrient supplements.  · Medicines. Certain medicines treat the cause of your anemia. Others help build new RBCs or relieve symptoms. If a medicine is the cause of your anemia, you may need to stop or change it.  · Blood transfusions. Replacing some of your blood can increase the number of healthy RBCs in your body.  · Surgery. In some cases, your doctor may do surgery to treat the underlying cause of anemia. If you need surgery, your healthcare provider will explain the procedure and outline the risks and benefits for you.  What are the long-term concerns?  If you have a certain type of anemia, you can expect a full recovery after treatment. If you have other types of anemia (especially a type you're born with), you will need to manage it for life. Your doctor can tell you more.  Date Last Reviewed: 12/1/2016  © 1924-8752 [employer]. [address] [place] [address], [place] [address]. All rights reserved. This information is not intended as a substitute for  professional medical care. Always follow your healthcare professional's instructions.          GERD (Adult)    The esophagus is a tube that carries food from the mouth to the stomach. A valve at the lower end of the esophagus prevents stomach acid from flowing upward. When this valve doesn't work properly, stomach contents may repeatedly flow back up (reflux) into the esophagus. This is called gastroesophageal reflux disease (GERD). GERD can irritate the esophagus. It can cause problems with swallowing or breathing. In severe cases, GERD can cause recurrent pneumonia or other serious problems.  Symptoms of reflux include burning, pressure or sharp pain in the upper abdomen or mid to lower chest. The pain can spread to the neck, back, or shoulder. There may be belching, an acid taste in the back of the throat, chronic cough, or sore throat or hoarseness. GERD symptoms often occur during the day after a big meal. They can also occur at night when lying down.   Home care  Lifestyle changes can help reduce symptoms. If needed, medicines may be prescribed. Symptoms often improve with treatment, but if treatment is stopped, the symptoms often return after a few months. So most persons with GERD will need to continue treatment.  Lifestyle changes  · Limit or avoid fatty, fried, and spicy foods, as well as coffee, chocolate, mint, and foods with high acid content such as tomatoes and citrus fruit and juices (orange, grapefruit, lemon).  · Dont eat large meals, especially at night. Frequent, smaller meals are best. Do not lie down right after eating. And dont eat anything 3 hours before going to bed.  · Avoid drinking alcohol and smoking. As much as possible, stay away from second hand smoke.  · If you are overweight, losing weight will reduce symptoms.   · Avoid wearing tight clothing around your stomach area.  · If your symptoms occur during sleep, use a foam wedge to elevate your upper body (not just your head.) Or,  "place 4" blocks under the head of your bed.  Medicines  If needed, medicines can help relieve the symptoms of GERD and prevent damage to the esophagus. Discuss a medicine plan with your healthcare provider. This may include one or more of the following medicines:  · Antacids to help neutralize the normal acids in your stomach.  · Acid blockers (H2 blockers) to decrease acid production.  · Acid inhibitors (PPIs) to decrease acid production in a different way than the blockers. They may work better, but can take a little longer to take effect.  Take an antacid 30-60 minutes after eating and at bedtime, but not at the same time as an acid blocker.  Try not to take medicines such as ibuprofen and aspirin. If you are taking aspirin for your heart or other medical reasons, talk to your healthcare provider about stopping it.  Follow-up care  Follow up with your healthcare provider or as advised by our staff.  When to seek medical advice  Call your healthcare provider if any of the following occur:  · Stomach pain gets worse or moves to the lower right abdomen (appendix area)  · Chest pain appears or gets worse, or spreads to the back, neck, shoulder, or arm  · Frequent vomiting (cant keep down liquids)  · Blood in the stool or vomit (red or black in color)  · Feeling weak or dizzy  · Fever of 100.4ºF (38ºC) or higher, or as directed by your healthcare provider  Date Last Reviewed: 6/23/2015  © 1592-2811 [employer]. [address] [place] [address]. All rights reserved. This information is not intended as a substitute for professional medical care. Always follow your healthcare professional's instructions.            Abdominal Pain    Abdominal pain is pain in the stomach or belly area. Everyone has this pain from time to time. In many cases it goes away on its own. But abdominal pain can sometimes be due to a serious problem, such as appendicitis. So its important to know when to seek " help.  Causes of abdominal pain  There are many possible causes of abdominal pain. Common causes in adults include:  · Constipation, diarrhea, or gas  · Stomach acid flowing back up into the esophagus (acid reflux or heartburn)  · Severe acid reflux, called GERD (gastroesophageal reflux disease)  · A sore in the lining of the stomach or small intestine (peptic ulcer)  · Inflammation of the gallbladder, liver, or pancreas  · Gallstones or kidney stones  · Appendicitis   · Intestinal blockage   · An internal organ pushing through a muscle or other tissue (hernia)  · Urinary tract infections  · In women, menstrual cramps, fibroids, or endometriosis  · Inflammation or infection of the intestines  Diagnosing the cause of abdominal pain  Your healthcare provider will do a physical exam help find the cause of your pain. If needed, tests will be ordered. Belly pain has many possible causes. So it can be hard to find the reason for your pain. Giving details about your pain can help. Tell your provider where and when you feel the pain, and what makes it better or worse. Also let your provider know if you have other symptoms such as:  · Fever  · Tiredness  · Upset stomach (nausea)  · Vomiting  · Changes in bathroom habits  Treating abdominal pain  Some causes of pain need emergency medical treatment right away. These include appendicitis or a bowel blockage. Other problems can be treated with rest, fluids, or medicines. Your healthcare provider can give you specific instructions for treatment or self-care based on what is causing your pain.  If you have vomiting or diarrhea, sip water or other clear fluids. When you are ready to eat solid foods again, start with small amounts of easy-to-digest, low-fat foods. These include apple sauce, toast, or crackers.   When to seek medical care  Call 911 or go to the hospital right away if you:  · Cant pass stool and are vomiting  · Are vomiting blood or have bloody diarrhea or black,  tarry diarrhea  · Have chest, neck, or shoulder pain  · Feel like you might pass out  · Have pain in your shoulder blades with nausea  · Have sudden, severe belly pain  · Have new, severe pain unlike any you have felt before  · Have a belly that is rigid, hard, and tender to touch  Call your healthcare provider if you have:  · Pain for more than 5 days  · Bloating for more than 2 days  · Diarrhea for more than 5 days  · A fever of 100.4°F (38.0°C) or higher, or as directed by your provider  · Pain that gets worse  · Weight loss for no reason  · Continued lack of appetite  · Blood in your stool  How to prevent abdominal pain  Here are some tips to help prevent abdominal pain:  · Eat smaller amounts of food at one time.  · Avoid greasy, fried, or other high-fat foods.  · Avoid foods that give you gas.  · Exercise regularly.  · Drink plenty of fluids.  To help prevent GERD symptoms:  · Quit smoking.  · Reduce alcohol and certain foods that increase stomach acid.  · Avoid aspirin and over-the-counter pain and fever medicines (NSAIDS or nonsteroidal anti-inflammatory drugs), if possible  · Lose extra weight.  · Finish eating at least 2 hours before you go to bed or lie down.  · Raise the head of your bed.  Date Last Reviewed: 7/1/2016  © 2632-8178 [employer]. [address] [place] [address], [place] [address]. All rights reserved. This information is not intended as a substitute for professional medical care. Always follow your healthcare professional's instructions.

## 2019-11-27 ENCOUNTER — HOSPITAL ENCOUNTER (OUTPATIENT)
Dept: RADIOLOGY | Facility: HOSPITAL | Age: 79
Discharge: HOME | End: 2019-11-27
Attending: NURSE PRACTITIONER
Payer: MEDICARE

## 2019-11-27 DIAGNOSIS — R11.0 NAUSEA: ICD-10-CM

## 2019-11-27 DIAGNOSIS — R19.7 DIARRHEA, UNSPECIFIED TYPE: ICD-10-CM

## 2019-11-27 DIAGNOSIS — R63.4 UNINTENTIONAL WEIGHT LOSS: ICD-10-CM

## 2019-11-27 DIAGNOSIS — R10.84 GENERALIZED ABDOMINAL PAIN: ICD-10-CM

## 2019-11-27 DIAGNOSIS — R10.13 DYSPEPSIA: ICD-10-CM

## 2019-11-27 DIAGNOSIS — R63.4 WEIGHT LOSS: ICD-10-CM

## 2019-11-27 PROCEDURE — 74177 CT ABD & PELVIS W/CONTRAST: CPT | Mod: TC,PO

## 2019-11-27 PROCEDURE — 74177 CT ABDOMEN PELVIS WITH CONTRAST: ICD-10-PCS | Mod: 26,,, | Performed by: RADIOLOGY

## 2019-11-27 PROCEDURE — 25500020 PHARM REV CODE 255: Mod: PO | Performed by: NURSE PRACTITIONER

## 2019-11-27 PROCEDURE — 74177 CT ABD & PELVIS W/CONTRAST: CPT | Mod: 26,,, | Performed by: RADIOLOGY

## 2019-11-27 RX ADMIN — IOHEXOL 1000 ML: 9 SOLUTION ORAL at 10:11

## 2019-11-27 RX ADMIN — IOHEXOL 75 ML: 350 INJECTION, SOLUTION INTRAVENOUS at 10:11

## 2019-11-29 ENCOUNTER — ANESTHESIA EVENT (OUTPATIENT)
Dept: SURGERY | Facility: HOSPITAL | Age: 79
End: 2019-11-29
Payer: MEDICARE

## 2019-12-02 ENCOUNTER — ANESTHESIA (OUTPATIENT)
Dept: SURGERY | Facility: HOSPITAL | Age: 79
End: 2019-12-02
Payer: MEDICARE

## 2019-12-02 ENCOUNTER — TELEPHONE (OUTPATIENT)
Dept: GASTROENTEROLOGY | Facility: CLINIC | Age: 79
End: 2019-12-02

## 2019-12-02 ENCOUNTER — HOSPITAL ENCOUNTER (OUTPATIENT)
Facility: HOSPITAL | Age: 79
Discharge: HOME | End: 2019-12-02
Attending: INTERNAL MEDICINE | Admitting: INTERNAL MEDICINE
Payer: MEDICARE

## 2019-12-02 DIAGNOSIS — D61.818 PANCYTOPENIA: ICD-10-CM

## 2019-12-02 DIAGNOSIS — C85.80 MARGINAL ZONE LYMPHOMA: Primary | ICD-10-CM

## 2019-12-02 DIAGNOSIS — R63.4 WEIGHT LOSS: Primary | ICD-10-CM

## 2019-12-02 PROCEDURE — 88365 INSITU HYBRIDIZATION (FISH): CPT | Performed by: PATHOLOGY

## 2019-12-02 PROCEDURE — 88341 IMHCHEM/IMCYTCHM EA ADD ANTB: CPT | Mod: 26,,, | Performed by: PATHOLOGY

## 2019-12-02 PROCEDURE — 88365 INSITU HYBRIDIZATION (FISH): CPT | Mod: 26,,, | Performed by: PATHOLOGY

## 2019-12-02 PROCEDURE — 88342 CHG IMMUNOCYTOCHEMISTRY: ICD-10-PCS | Mod: 26,59,, | Performed by: PATHOLOGY

## 2019-12-02 PROCEDURE — 88313 PR  SPECIAL STAINS,GROUP II: ICD-10-PCS | Mod: 26,,, | Performed by: PATHOLOGY

## 2019-12-02 PROCEDURE — 88341 IMHCHEM/IMCYTCHM EA ADD ANTB: CPT | Performed by: PATHOLOGY

## 2019-12-02 PROCEDURE — 88185 FLOWCYTOMETRY/TC ADD-ON: CPT | Performed by: PATHOLOGY

## 2019-12-02 PROCEDURE — 88305 TISSUE EXAM BY PATHOLOGIST: CPT | Mod: 59 | Performed by: PATHOLOGY

## 2019-12-02 PROCEDURE — 88305 TISSUE EXAM BY PATHOLOGIST: CPT | Mod: 26,,, | Performed by: PATHOLOGY

## 2019-12-02 PROCEDURE — 36000704 HC OR TIME LEV I 1ST 15 MIN: Mod: PO | Performed by: INTERNAL MEDICINE

## 2019-12-02 PROCEDURE — 88364 CHG INSITU HYBRIDIZATION (FISH: ICD-10-PCS | Mod: 26,,, | Performed by: PATHOLOGY

## 2019-12-02 PROCEDURE — D9220A PRA ANESTHESIA: ICD-10-PCS | Mod: CRNA,,, | Performed by: NURSE ANESTHETIST, CERTIFIED REGISTERED

## 2019-12-02 PROCEDURE — 88342 IMHCHEM/IMCYTCHM 1ST ANTB: CPT | Mod: 59 | Performed by: PATHOLOGY

## 2019-12-02 PROCEDURE — 25000003 PHARM REV CODE 250: Mod: PO | Performed by: INTERNAL MEDICINE

## 2019-12-02 PROCEDURE — 88184 FLOWCYTOMETRY/ TC 1 MARKER: CPT | Performed by: PATHOLOGY

## 2019-12-02 PROCEDURE — 88313 SPECIAL STAINS GROUP 2: CPT | Mod: 26,,, | Performed by: PATHOLOGY

## 2019-12-02 PROCEDURE — D9220A PRA ANESTHESIA: ICD-10-PCS | Mod: ANES,,, | Performed by: ANESTHESIOLOGY

## 2019-12-02 PROCEDURE — 88342 IMHCHEM/IMCYTCHM 1ST ANTB: CPT | Mod: 26,59,, | Performed by: PATHOLOGY

## 2019-12-02 PROCEDURE — 88341 PR IHC OR ICC EACH ADD'L SINGLE ANTIBODY  STAINPR: ICD-10-PCS | Mod: 26,,, | Performed by: PATHOLOGY

## 2019-12-02 PROCEDURE — 71000015 HC POSTOP RECOV 1ST HR: Mod: PO | Performed by: INTERNAL MEDICINE

## 2019-12-02 PROCEDURE — 88305 TISSUE EXAM BY PATHOLOGIST: ICD-10-PCS | Mod: 26,,, | Performed by: PATHOLOGY

## 2019-12-02 PROCEDURE — 88237 TISSUE CULTURE BONE MARROW: CPT

## 2019-12-02 PROCEDURE — 37000009 HC ANESTHESIA EA ADD 15 MINS: Mod: PO | Performed by: INTERNAL MEDICINE

## 2019-12-02 PROCEDURE — 88189 FLOWCYTOMETRY/READ 16 & >: CPT | Mod: ,,, | Performed by: PATHOLOGY

## 2019-12-02 PROCEDURE — 63600175 PHARM REV CODE 636 W HCPCS: Mod: PO | Performed by: NURSE ANESTHETIST, CERTIFIED REGISTERED

## 2019-12-02 PROCEDURE — 88313 SPECIAL STAINS GROUP 2: CPT | Mod: 59 | Performed by: PATHOLOGY

## 2019-12-02 PROCEDURE — 88189 PR  FLOWCYTOMETRY/READ, 16 & > MARKERS: ICD-10-PCS | Mod: ,,, | Performed by: PATHOLOGY

## 2019-12-02 PROCEDURE — 38222 PR BONE MARROW BIOPSY(IES) W/ASPIRATION(S); DIAGNOSTIC: ICD-10-PCS | Mod: 50,,, | Performed by: INTERNAL MEDICINE

## 2019-12-02 PROCEDURE — 88305 TISSUE EXAM BY PATHOLOGIST: CPT

## 2019-12-02 PROCEDURE — 88364 INSITU HYBRIDIZATION (FISH): CPT | Performed by: PATHOLOGY

## 2019-12-02 PROCEDURE — 88311 DECALCIFY TISSUE: CPT | Mod: 26,,, | Performed by: PATHOLOGY

## 2019-12-02 PROCEDURE — 71000033 HC RECOVERY, INTIAL HOUR: Mod: PO | Performed by: INTERNAL MEDICINE

## 2019-12-02 PROCEDURE — 88311 PR  DECALCIFY TISSUE: ICD-10-PCS | Mod: 26,,, | Performed by: PATHOLOGY

## 2019-12-02 PROCEDURE — D9220A PRA ANESTHESIA: Mod: CRNA,,, | Performed by: NURSE ANESTHETIST, CERTIFIED REGISTERED

## 2019-12-02 PROCEDURE — 85097 BONE MARROW INTERPRETATION: CPT | Mod: ,,, | Performed by: PATHOLOGY

## 2019-12-02 PROCEDURE — 36000705 HC OR TIME LEV I EA ADD 15 MIN: Mod: PO | Performed by: INTERNAL MEDICINE

## 2019-12-02 PROCEDURE — 63600175 PHARM REV CODE 636 W HCPCS: Mod: PO | Performed by: ANESTHESIOLOGY

## 2019-12-02 PROCEDURE — 37000008 HC ANESTHESIA 1ST 15 MINUTES: Mod: PO | Performed by: INTERNAL MEDICINE

## 2019-12-02 PROCEDURE — 88365 PR  TISSUE HYBRIDIZATION: ICD-10-PCS | Mod: 26,,, | Performed by: PATHOLOGY

## 2019-12-02 PROCEDURE — 88364 INSITU HYBRIDIZATION (FISH): CPT | Mod: 26,,, | Performed by: PATHOLOGY

## 2019-12-02 PROCEDURE — 85097 PR  BONE MARROW,SMEAR INTERPRETATION: ICD-10-PCS | Mod: ,,, | Performed by: PATHOLOGY

## 2019-12-02 PROCEDURE — 88264 CHROMOSOME ANALYSIS 20-25: CPT

## 2019-12-02 PROCEDURE — 38222 DX BONE MARROW BX & ASPIR: CPT | Mod: 50,,, | Performed by: INTERNAL MEDICINE

## 2019-12-02 PROCEDURE — D9220A PRA ANESTHESIA: Mod: ANES,,, | Performed by: ANESTHESIOLOGY

## 2019-12-02 RX ORDER — SODIUM CHLORIDE 0.9 % (FLUSH) 0.9 %
3 SYRINGE (ML) INJECTION EVERY 8 HOURS
Status: DISCONTINUED | OUTPATIENT
Start: 2019-12-02 | End: 2019-12-02 | Stop reason: HOSPADM

## 2019-12-02 RX ORDER — OXYCODONE HYDROCHLORIDE 5 MG/1
5 TABLET ORAL
Status: CANCELLED | OUTPATIENT
Start: 2019-12-02

## 2019-12-02 RX ORDER — ACETAMINOPHEN 10 MG/ML
INJECTION, SOLUTION INTRAVENOUS
Status: DISCONTINUED | OUTPATIENT
Start: 2019-12-02 | End: 2019-12-02

## 2019-12-02 RX ORDER — PROPOFOL 10 MG/ML
VIAL (ML) INTRAVENOUS
Status: DISCONTINUED | OUTPATIENT
Start: 2019-12-02 | End: 2019-12-02

## 2019-12-02 RX ORDER — FENTANYL CITRATE 50 UG/ML
25 INJECTION, SOLUTION INTRAMUSCULAR; INTRAVENOUS EVERY 5 MIN PRN
Status: CANCELLED | OUTPATIENT
Start: 2019-12-02

## 2019-12-02 RX ORDER — LIDOCAINE HYDROCHLORIDE 10 MG/ML
1 INJECTION, SOLUTION EPIDURAL; INFILTRATION; INTRACAUDAL; PERINEURAL ONCE
Status: DISCONTINUED | OUTPATIENT
Start: 2019-12-02 | End: 2019-12-02 | Stop reason: HOSPADM

## 2019-12-02 RX ORDER — SODIUM CHLORIDE, SODIUM LACTATE, POTASSIUM CHLORIDE, CALCIUM CHLORIDE 600; 310; 30; 20 MG/100ML; MG/100ML; MG/100ML; MG/100ML
500 INJECTION, SOLUTION INTRAVENOUS ONCE
Status: DISCONTINUED | OUTPATIENT
Start: 2019-12-02 | End: 2019-12-02 | Stop reason: HOSPADM

## 2019-12-02 RX ORDER — SODIUM CHLORIDE, SODIUM LACTATE, POTASSIUM CHLORIDE, CALCIUM CHLORIDE 600; 310; 30; 20 MG/100ML; MG/100ML; MG/100ML; MG/100ML
10 INJECTION, SOLUTION INTRAVENOUS CONTINUOUS
Status: DISCONTINUED | OUTPATIENT
Start: 2019-12-02 | End: 2019-12-02 | Stop reason: HOSPADM

## 2019-12-02 RX ORDER — ONDANSETRON 2 MG/ML
INJECTION INTRAMUSCULAR; INTRAVENOUS
Status: DISCONTINUED | OUTPATIENT
Start: 2019-12-02 | End: 2019-12-02

## 2019-12-02 RX ORDER — HYDROMORPHONE HYDROCHLORIDE 2 MG/ML
0.2 INJECTION, SOLUTION INTRAMUSCULAR; INTRAVENOUS; SUBCUTANEOUS EVERY 5 MIN PRN
Status: CANCELLED | OUTPATIENT
Start: 2019-12-02

## 2019-12-02 RX ORDER — LIDOCAINE HCL/PF 100 MG/5ML
SYRINGE (ML) INTRAVENOUS
Status: DISCONTINUED | OUTPATIENT
Start: 2019-12-02 | End: 2019-12-02

## 2019-12-02 RX ORDER — LIDOCAINE HYDROCHLORIDE 10 MG/ML
INJECTION, SOLUTION EPIDURAL; INFILTRATION; INTRACAUDAL; PERINEURAL
Status: DISCONTINUED | OUTPATIENT
Start: 2019-12-02 | End: 2019-12-02 | Stop reason: HOSPADM

## 2019-12-02 RX ORDER — PROPOFOL 10 MG/ML
VIAL (ML) INTRAVENOUS CONTINUOUS PRN
Status: DISCONTINUED | OUTPATIENT
Start: 2019-12-02 | End: 2019-12-02

## 2019-12-02 RX ORDER — MIDAZOLAM HYDROCHLORIDE 1 MG/ML
INJECTION, SOLUTION INTRAMUSCULAR; INTRAVENOUS
Status: DISCONTINUED | OUTPATIENT
Start: 2019-12-02 | End: 2019-12-02

## 2019-12-02 RX ORDER — FENTANYL CITRATE 50 UG/ML
INJECTION, SOLUTION INTRAMUSCULAR; INTRAVENOUS
Status: DISCONTINUED | OUTPATIENT
Start: 2019-12-02 | End: 2019-12-02

## 2019-12-02 RX ADMIN — SODIUM CHLORIDE, SODIUM LACTATE, POTASSIUM CHLORIDE, AND CALCIUM CHLORIDE 10 ML/HR: .6; .31; .03; .02 INJECTION, SOLUTION INTRAVENOUS at 06:12

## 2019-12-02 RX ADMIN — MIDAZOLAM HYDROCHLORIDE 1 MG: 1 INJECTION, SOLUTION INTRAMUSCULAR; INTRAVENOUS at 07:12

## 2019-12-02 RX ADMIN — PROPOFOL 30 MG: 10 INJECTION, EMULSION INTRAVENOUS at 07:12

## 2019-12-02 RX ADMIN — LIDOCAINE HYDROCHLORIDE 60 MG: 20 INJECTION PARENTERAL at 07:12

## 2019-12-02 RX ADMIN — ACETAMINOPHEN 1000 MG: 10 INJECTION, SOLUTION INTRAVENOUS at 07:12

## 2019-12-02 RX ADMIN — ONDANSETRON 4 MG: 2 INJECTION, SOLUTION INTRAMUSCULAR; INTRAVENOUS at 07:12

## 2019-12-02 RX ADMIN — FENTANYL CITRATE 50 MCG: 50 INJECTION, SOLUTION INTRAMUSCULAR; INTRAVENOUS at 07:12

## 2019-12-02 RX ADMIN — PROPOFOL 100 MCG/KG/MIN: 10 INJECTION, EMULSION INTRAVENOUS at 07:12

## 2019-12-02 NOTE — TRANSFER OF CARE
Anesthesia Transfer of Care Note    Patient: Anabell Madrid    Procedure(s) Performed: Procedure(s) (LRB):  ASPIRATION, BONE MARROW (Bilateral)    Patient location: PACU    Anesthesia Type: MAC    Transport from OR: Transported from OR on 2-3 L/min O2 by NC with adequate spontaneous ventilation    Post pain: adequate analgesia    Post assessment: no apparent anesthetic complications and tolerated procedure well    Post vital signs: stable    Level of consciousness: awake, alert and oriented    Nausea/Vomiting: no nausea/vomiting    Complications: none    Transfer of care protocol was followed      Last vitals:   Visit Vitals  /60   Pulse 92   Temp 36.4 °C (97.5 °F) (Skin)   Resp 18   Ht 5' (1.524 m)   Wt 40.6 kg (89 lb 8.1 oz)   SpO2 98%   Breastfeeding? No   BMI 17.48 kg/m²

## 2019-12-02 NOTE — TELEPHONE ENCOUNTER
I reordered it and please inform lab to run all stool studies that I had ordered, including giardia/crypto and OCP.  NATHANAELP

## 2019-12-02 NOTE — TELEPHONE ENCOUNTER
""  Canceled Other Allen, Soft Lab Interface 12/2/19 1403   IgA, Quantitative was cancelled on 12/02/2019 at 14:03 by LRS; blood not drawn only stool specimen dropped off      "  "  Canceled Other Allen, Soft Lab Interface 12/2/19 1403   Tissue Transglutaminase Ab, IgA, S was cancelled on 12/02/2019 at 14:03 by LRS; blood not drawn only stool specimen dropped off      "  "

## 2019-12-02 NOTE — PROCEDURES
BILATERAL BONE MARROW ASPIRATION AND BIOPSY    PREOP DIAGNOSIS:  1.  HISTORY OF MARGINAL ZONE LYMPHOMA  2.  RECURRENT ANEMIA    POSTOP DIAGNOSIS:  SAME    SURGEON:  LENNY    SUMMARY:  Time-out was obtained in the room.  After obtaining informed consent, the patient was taken to the Ambulatory Surgical Care unit, placed under MAC, prepped and draped in the usual sterile fashion, and anesthetized with 1% xylocaine.  [facility] were subsequently advanced into the left and in the right posterior, superior, iliac crests.  Aspirates, biopsies, and a separate aliquot for flow and cytogenetic analysis were obtained on the 1st passes.  The needles were withdrawn and the wounds dressed with 4x4s and Elastoplast applied as a pressure dressing.  The patient tolerated the procedure well.  There were no immediate complications.  She was transported to recovery in stable condition.  Estimated blood loss is less than 15 cc.

## 2019-12-02 NOTE — ANESTHESIA POSTPROCEDURE EVALUATION
Anesthesia Post Evaluation    Patient: Anabell Madrid    Procedure(s) Performed: Procedure(s) (LRB):  ASPIRATION, BONE MARROW (Bilateral)    Final Anesthesia Type: general    Patient location during evaluation: PACU  Patient participation: Yes- Able to Participate  Level of consciousness: sedated and awake  Post-procedure vital signs: reviewed and stable  Pain management: adequate  Airway patency: patent    PONV status at discharge: No PONV  Anesthetic complications: no      Cardiovascular status: blood pressure returned to baseline  Respiratory status: spontaneous ventilation  Hydration status: euvolemic  Follow-up not needed.          Vitals Value Taken Time   /55 12/2/2019  7:40 AM   Temp 36.3 °C (97.3 °F) 12/2/2019  7:40 AM   Pulse 93 12/2/2019  7:40 AM   Resp 14 12/2/2019  7:40 AM   SpO2 98 % 12/2/2019  7:40 AM         No case tracking events are documented in the log.      Pain/Yuan Score: Yuan Score: 10 (12/2/2019  7:42 AM)

## 2019-12-02 NOTE — H&P
History of present illness:  The patient is an 79-year-old white female who relocated here from [place].  She was recently seen by Jacob De La O in order to establish care in our clinic.  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  She was diagnosed in 2009, treated with Treanda/Rituxan, attained remission, and completed 2 years of maintenance therapy with Rituxan.  She has remained without evidence of disease recurrence.  At the time of her clinic appointment she had complaints of a 10 lb weight loss over the summer which she could not explain.  She was also experiencing sweats.  She denies lymphadenopathy and pruritus.  She has not experience fever.  She denies any signs or symptoms of GI bleeding. Interval CT PET was negative.  Patient returns to clinic to review laboratory workup regarding worsening anemia.  She has new complaint of 2 week history of abdominal pain, nausea, emesis, and diminished oral intake, as well as continued weight loss.  No other complaints pertinent findings on a 14 point review of systems.  Patient returns to review results of interval lab and imaging for restaging.     Physical examination:  The patient is a well-developed, thin appearing, elderly, white female in no acute distress, who has a weight of 89 lb (increased by 6.5 lb).  VITAL SIGNS: Documented  and reviewed this visit.  HEENT: Normocephalic, atraumatic. Oral mucosa pink and moist. Lips without   lesions. Tongue midline. Oropharynx clear. Nonicteric sclerae.   NECK: Supple, no adenopathy. No carotid bruits, thyromegaly or thyroid nodule.   HEART: Regular rate and rhythm without murmur, gallop or rub.   LUNGS: Clear to auscultation bilaterally. Normal respiratory effort.   ABDOMEN: Soft, nontender, nondistended with positive normoactive bowel sounds,   no hepatosplenomegaly.   EXTREMITIES: No cyanosis, clubbing or edema. Distal pulses are intact.   AXILLAE  AND GROIN: No palpable pathologic lymphadenopathy is appreciated.   SKIN: Intact/turgor normal   NEUROLOGIC: Cranial nerves II-XII grossly intact. Motor: Good muscle bulk and   tone. Strength/sensory 5/5 throughout. Gait stable.      Laboratory:  Ferritin 6, serum iron 93, TIBC 396, percent saturated iron 23, soluble transferrin receptor 3.2.  Folate greater than 20, B12 greater than 1000.  Haptoglobin 168.  Stools for occult blood are negative x3.     Impression:  1.  History of marginal zone B-cell non-Hodgkin's lymphoma.  2.  New onset normocytic anemia.  3.  Unexplained weight loss.  4.  Night sweats.  5.  Abdominal pain in association with nausea emesis.     Plan:  1.  Start Prilosec 40 mg daily and Compazine 10 mg every 6 hr as needed for nausea emesis.  2.  Consult Dr. Soriano for EGD.  3.  Obtain bilateral bone marrow aspiration and biopsy with flow and cytogenetic analysis.  4.  Informed consent for bone marrow aspiration and biopsy was obtained during the course of today's office evaluation.  5.  Patient is to return to clinic to review results at earliest convenience and will have interval lab to include CBC, CMP, LDH, magnesium, beta 2 microglobulin in association with an appointment..     This note was created using voice recognition software and may contain grammatical errors.          Electronically signed by Guevara Fuller MD at 11/20/2019  2:30 PM      There have been no significant changes to the patient's history or physical examination since my evaluation as detailed above.

## 2019-12-02 NOTE — DISCHARGE INSTRUCTIONS
BONE MARROW ASPIRATION    DO'S   Minimal activity and light meals for 24 hours.   Keep operative site clean and dry for 24 hours.   May remove dressing then shower in 24 hours.   May take Tylenol for discomfort.    Resume all home medications.    DON'T   No driving for 24 hours or while taking narcotic pain medication.     CALL PHYSICIAN FOR:   Bleeding or any signs of infection (redness, draining pus or warm to touch).   Fever greater than 101.   Persistent pain not relieved by pain medications.    RETURN APPOINTMENT AS INSTRUCTED.  CALL [phone]  TO CONTACT DOCTOR      Discharge Instructions: After Your Surgery  Youve just had surgery. During surgery, you were given medicine called anesthesia to keep you relaxed and free of pain. After surgery, you may have some pain or nausea. This is common. Here are some tips for feeling better and getting well after surgery.     Stay on schedule with your medicine.   Going home  Your healthcare provider will show you how to take care of yourself when you go home. He or she will also answer your questions. Have an adult family member or friend drive you home. For the first 24 hours after your surgery:  · Do not drive or use heavy equipment.  · Do not make important decisions or sign legal papers.  · Do not drink alcohol.  · Have someone stay with you, if needed. He or she can watch for problems and help keep you safe.  Be sure to go to all follow-up visits with your healthcare provider. And rest after your surgery for as long as your healthcare provider tells you to.  Coping with pain  If you have pain after surgery, pain medicine will help you feel better. Take it as told, before pain becomes severe. Also, ask your healthcare provider or pharmacist about other ways to control pain. This might be with heat, ice, or relaxation. And follow any other instructions your surgeon or nurse gives you.  Tips for taking pain medicine  To get the best relief possible, remember these  points:  · Pain medicines can upset your stomach. Taking them with a little food may help.  · Most pain relievers taken by mouth need at least 20 to 30 minutes to start to work.  · Taking medicine on a schedule can help you remember to take it. Try to time your medicine so that you can take it before starting an activity. This might be before you get dressed, go for a walk, or sit down for dinner.  · Constipation is a common side effect of pain medicines. Call your healthcare provider before taking any medicines such as laxatives or stool softeners to help ease constipation. Also ask if you should skip any foods. Drinking lots of fluids and eating foods such as fruits and vegetables that are high in fiber can also help. Remember, do not take laxatives unless your surgeon has prescribed them.  · Drinking alcohol and taking pain medicine can cause dizziness and slow your breathing. It can even be deadly. Do not drink alcohol while taking pain medicine.  · Pain medicine can make you react more slowly to things. Do not drive or run machinery while taking pain medicine.  Your healthcare provider may tell you to take acetaminophen to help ease your pain. Ask him or her how much you are supposed to take each day. Acetaminophen or other pain relievers may interact with your prescription medicines or other over-the-counter (OTC) medicines. Some prescription medicines have acetaminophen and other ingredients. Using both prescription and OTC acetaminophen for pain can cause you to overdose. Read the labels on your OTC medicines with care. This will help you to clearly know the list of ingredients, how much to take, and any warnings. It may also help you not take too much acetaminophen. If you have questions or do not understand the information, ask your pharmacist or healthcare provider to explain it to you before you take the OTC medicine.  Managing nausea  Some people have an upset stomach after surgery. This is often  because of anesthesia, pain, or pain medicine, or the stress of surgery. These tips will help you handle nausea and eat healthy foods as you get better. If you were on a special food plan before surgery, ask your healthcare provider if you should follow it while you get better. These tips may help:  · Do not push yourself to eat. Your body will tell you when to eat and how much.  · Start off with clear liquids and soup. They are easier to digest.  · Next try semi-solid foods, such as mashed potatoes, applesauce, and gelatin, as you feel ready.  · Slowly move to solid foods. Dont eat fatty, rich, or spicy foods at first.  · Do not force yourself to have 3 large meals a day. Instead eat smaller amounts more often.  · Take pain medicines with a small amount of solid food, such as crackers or toast, to avoid nausea.     Call your surgeon if  · You still have pain an hour after taking medicine. The medicine may not be strong enough.  · You feel too sleepy, dizzy, or groggy. The medicine may be too strong.  · You have side effects like nausea, vomiting, or skin changes, such as rash, itching, or hives.       If you have obstructive sleep apnea  You were given anesthesia medicine during surgery to keep you comfortable and free of pain. After surgery, you may have more apnea spells because of this medicine and other medicines you were given. The spells may last longer than usual.   At home:  · Keep using the continuous positive airway pressure (CPAP) device when you sleep. Unless your healthcare provider tells you not to, use it when you sleep, day or night. CPAP is a common device used to treat obstructive sleep apnea.  · Talk with your provider before taking any pain medicine, muscle relaxants, or sedatives. Your provider will tell you about the possible dangers of taking these medicines.  Date Last Reviewed: 12/1/2016  © 1427-4018 [employer]. [address] [place] [address], [place] [address]. All rights  reserved. This information is not intended as a substitute for professional medical care. Always follow your healthcare professional's instructions.

## 2019-12-02 NOTE — PLAN OF CARE
Patient tolerating oral liquids without difficulty. No apparent s&s of distress noted at this time, no complaints voiced at this time.Dressing to lower back free from drainage.  Discharge instructions reviewed with patient/family/friend with good verbal feedback received. Patient ready for discharge

## 2019-12-02 NOTE — TELEPHONE ENCOUNTER
"----- Message from [employer] sent at 12/2/2019  2:03 PM CST -----  Regarding: Cancellation of Order # [id number]  Order number [id number] for the procedure IGA [LAB73] has been   canceled by [employer] [[id number]]. This procedure was   ordered by WILD Lopez [[id number]] on Nov 25, 2019 for   the patient Anabell Madrid [[id number]]. The reason for   cancellation was "None".    This was a future order.  "

## 2019-12-02 NOTE — ANESTHESIA PREPROCEDURE EVALUATION
12/02/2019  Anabell Madrid is a 79 y.o., female.    Anesthesia Evaluation    I have reviewed the Patient Summary Reports.    I have reviewed the Nursing Notes.   I have reviewed the Medications.     Review of Systems  Hematology/Oncology:         -- Anemia: Hematology Comments: Hgb 9.7, B cell Lymphoma   Cardiovascular:   Exercise tolerance: good Hypertension    Pulmonary:  Pulmonary Normal    Renal/:  Renal/ Normal     Hepatic/GI:   GERD    Neurological:  Neurology Normal    Endocrine:  Endocrine Normal    Psych:  Psychiatric Normal           Physical Exam  General:  Cachexia    Airway/Jaw/Neck:  Airway Findings: Mouth Opening: Normal Tongue: Normal  General Airway Assessment: Adult  Mallampati: III  Improves to II with phonation.  Jaw/Neck Findings:  Neck ROM: Normal ROM      Dental:  Dental Findings: In tact   Chest/Lungs:  Chest/Lungs Findings: Clear to auscultation, Normal Respiratory Rate         Mental Status:  Mental Status Findings:  Cooperative, Alert and Oriented         Anesthesia Plan  Type of Anesthesia, risks & benefits discussed:  Anesthesia Type:  general  Patient's Preference:   Intra-op Monitoring Plan: standard ASA monitors  Intra-op Monitoring Plan Comments:   Post Op Pain Control Plan:   Post Op Pain Control Plan Comments:   Induction:   IV  Beta Blocker:  Patient is not currently on a Beta-Blocker (No further documentation required).       Informed Consent: Patient understands risks and agrees with Anesthesia plan.  Questions answered. Anesthesia consent signed with patient.  ASA Score: 2     Day of Surgery Review of History & Physical:            Ready For Surgery From Anesthesia Perspective.

## 2019-12-02 NOTE — DISCHARGE SUMMARY
The patient is an 79-year-old white female well known to me for history of marginal zone lymphoma who now presents with recurrent anemia, unexplained weight loss, and night sweats suspicious for disease recurrence.  Patient was scheduled to undergo outpatient, bilateral, bone marrow aspiration and biopsy.  The procedure was carried out per protocol without difficulty and the patient recovered without event.  She is discharged home with final diagnoses including history of marginal zone lymphoma and recurrent anemia.  Patient is to keep previously scheduled office appointment to follow up results of this test and further plan care.

## 2019-12-03 ENCOUNTER — TELEPHONE (OUTPATIENT)
Dept: GASTROENTEROLOGY | Facility: CLINIC | Age: 79
End: 2019-12-03

## 2019-12-03 VITALS
HEIGHT: 60 IN | HEART RATE: 73 BPM | BODY MASS INDEX: 17.57 KG/M2 | TEMPERATURE: 97 F | DIASTOLIC BLOOD PRESSURE: 61 MMHG | OXYGEN SATURATION: 100 % | RESPIRATION RATE: 14 BRPM | SYSTOLIC BLOOD PRESSURE: 111 MMHG | WEIGHT: 89.5 LBS

## 2019-12-03 NOTE — TELEPHONE ENCOUNTER
"Discussed case and results of ct scan with Dr. Moreno. He recommended to continue with EGD with focus to evaluate the papillae and if EGD findings are unremarkable, then schedule EUS to further evaluate enlarged pancreatic duct.    Please call to inform & review the results with the patient- radiology report of the ct scan abdomen pelvis showed "Small pulmonary nodules.  For multiple solid nodules all <6 mm, Fleischner Society 2017 guidelines recommend no routine follow up for a low risk patient, or follow up with non-contrast chest CT at 12 months after discovery in a high risk patient": Recommend follow-up with Primary Care Provider for continued evaluation and management of this finding and to determine appropriate follow-up.    "Mild enlargement the main pancreatic duct, which is nonspecific" Dr. Moreno recommends to continue EGD as scheduled and if findings are unremarkable, he recommends completing EUS (ct scan routed to Dr. Casper).    "Liquid stool throughout the colon." Stool studies are processing (PLEASE MAKE SURE LAB RUNS ALL OF THE STOOL STUDIES), patient is scheduled for colonoscopy on 1/2/2020.  Some stool studies are still pending (we will notify you once received and reviewed), but the ones that have come back showed negative/normal findings.    Other findings showed fatty liver. For fatty liver recommend: low fat, low cholesterol diet, maintain good control of blood sugars and cholesterol levels, exercise, weight loss (if overweight), minimize/avoid alcohol and tylenol products, & follow-up with PCP for continued evaluation and management; if specialist is needed, recommend seeing hepatology.  Degenerative changes of the spine: Recommend follow-up with Primary Care Provider for continued evaluation and management.    Continue with previous recommendations. If no improvement in symptoms or symptoms worsen, call/follow-up at clinic or go to ER.  Please release results to patient's mychart " once you have discussed results and recommendations with patient.  Thanks,

## 2019-12-04 LAB
BODY SITE - BONE MARROW: NORMAL
CLINICAL DIAGNOSIS - BONE MARROW: NORMAL
FLOW CYTOMETRY ANTIBODIES ANALYZED - BONE MARROW: NORMAL
FLOW CYTOMETRY COMMENT - BONE MARROW: NORMAL
FLOW CYTOMETRY INTERPRETATION - BONE MARROW: NORMAL

## 2019-12-06 ENCOUNTER — TELEPHONE (OUTPATIENT)
Dept: GASTROENTEROLOGY | Facility: CLINIC | Age: 79
End: 2019-12-06

## 2019-12-06 NOTE — TELEPHONE ENCOUNTER
----- Message from Lenin Moreno MD sent at 12/6/2019  2:30 PM CST -----  Tell pt stool evaluation returned negative/normal. F/U endoscopy as scheduled.

## 2019-12-09 LAB
CHROM BANDING METHOD: NORMAL
CHROMOSOME ANALYSIS BM ADDITIONAL INFORMATION: NORMAL
CHROMOSOME ANALYSIS BM RELEASED BY: NORMAL
CHROMOSOME ANALYSIS BM RESULT SUMMARY: NORMAL
CLINICAL CYTOGENETICIST REVIEW: NORMAL
KARYOTYP MAR: NORMAL
REASON FOR REFERRAL (NARRATIVE): NORMAL
REF LAB TEST METHOD: NORMAL
SPECIMEN SOURCE: NORMAL
SPECIMEN: NORMAL

## 2019-12-11 LAB
FINAL PATHOLOGIC DIAGNOSIS: NORMAL
GROSS: NORMAL
Lab: NORMAL
MICROSCOPIC EXAM: NORMAL
SUPPLEMENTAL DIAGNOSIS: NORMAL

## 2019-12-16 ENCOUNTER — TELEPHONE (OUTPATIENT)
Dept: HEMATOLOGY/ONCOLOGY | Facility: CLINIC | Age: 79
End: 2019-12-16

## 2019-12-16 NOTE — TELEPHONE ENCOUNTER
As no appt was made for labs prior to follow up, called patient, patient will come at 940 am and have her labs drawn on the first floor prior to seeing Dr. Fuller.    Patient voiced understanding and appreciation

## 2019-12-17 ENCOUNTER — OFFICE VISIT (OUTPATIENT)
Dept: HEMATOLOGY/ONCOLOGY | Facility: CLINIC | Age: 79
End: 2019-12-17
Payer: MEDICARE

## 2019-12-17 ENCOUNTER — TELEPHONE (OUTPATIENT)
Dept: GASTROENTEROLOGY | Facility: CLINIC | Age: 79
End: 2019-12-17

## 2019-12-17 VITALS
WEIGHT: 89.75 LBS | DIASTOLIC BLOOD PRESSURE: 67 MMHG | HEART RATE: 92 BPM | HEIGHT: 60 IN | TEMPERATURE: 98 F | OXYGEN SATURATION: 96 % | SYSTOLIC BLOOD PRESSURE: 127 MMHG | BODY MASS INDEX: 17.62 KG/M2 | RESPIRATION RATE: 16 BRPM

## 2019-12-17 DIAGNOSIS — R10.10 PAIN OF UPPER ABDOMEN: ICD-10-CM

## 2019-12-17 DIAGNOSIS — R11.2 NON-INTRACTABLE VOMITING WITH NAUSEA, UNSPECIFIED VOMITING TYPE: ICD-10-CM

## 2019-12-17 DIAGNOSIS — C85.80 MARGINAL ZONE B-CELL LYMPHOMA: Primary | ICD-10-CM

## 2019-12-17 DIAGNOSIS — D64.9 NORMOCYTIC ANEMIA: ICD-10-CM

## 2019-12-17 DIAGNOSIS — R63.4 UNEXPLAINED WEIGHT LOSS: ICD-10-CM

## 2019-12-17 DIAGNOSIS — R61 NIGHT SWEATS: ICD-10-CM

## 2019-12-17 PROCEDURE — 1125F AMNT PAIN NOTED PAIN PRSNT: CPT | Mod: ,,, | Performed by: INTERNAL MEDICINE

## 2019-12-17 PROCEDURE — 1159F MED LIST DOCD IN RCRD: CPT | Mod: ,,, | Performed by: INTERNAL MEDICINE

## 2019-12-17 PROCEDURE — 99215 OFFICE O/P EST HI 40 MIN: CPT | Mod: PBBFAC,PN | Performed by: INTERNAL MEDICINE

## 2019-12-17 PROCEDURE — 1159F PR MEDICATION LIST DOCUMENTED IN MEDICAL RECORD: ICD-10-PCS | Mod: ,,, | Performed by: INTERNAL MEDICINE

## 2019-12-17 PROCEDURE — 99214 PR OFFICE/OUTPT VISIT, EST, LEVL IV, 30-39 MIN: ICD-10-PCS | Mod: S$PBB,,, | Performed by: INTERNAL MEDICINE

## 2019-12-17 PROCEDURE — 1125F PR PAIN SEVERITY QUANTIFIED, PAIN PRESENT: ICD-10-PCS | Mod: ,,, | Performed by: INTERNAL MEDICINE

## 2019-12-17 PROCEDURE — 99214 OFFICE O/P EST MOD 30 MIN: CPT | Mod: S$PBB,,, | Performed by: INTERNAL MEDICINE

## 2019-12-17 PROCEDURE — 99999 PR PBB SHADOW E&M-EST. PATIENT-LVL V: ICD-10-PCS | Mod: PBBFAC,,, | Performed by: INTERNAL MEDICINE

## 2019-12-17 PROCEDURE — 99999 PR PBB SHADOW E&M-EST. PATIENT-LVL V: CPT | Mod: PBBFAC,,, | Performed by: INTERNAL MEDICINE

## 2019-12-17 RX ORDER — MAGNESIUM 30 MG
TABLET ORAL ONCE
COMMUNITY
End: 2020-06-05

## 2019-12-17 NOTE — PROGRESS NOTES
History of present illness:  The patient is an 79-year-old white female who relocated here from [place].  She was recently seen by Jacob De La O in order to establish care in our clinic.  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  She was diagnosed in 2009, treated with Treanda/Rituxan, attained remission, and completed 2 years of maintenance therapy with Rituxan.  She has remained without evidence of disease recurrence.  At the time of her clinic appointment she had complaints of a 10 lb weight loss over the summer which she could not explain.  She was also experiencing sweats.  She denies lymphadenopathy and pruritus.  She has not experience fever.  She denies any signs or symptoms of GI bleeding. Interval CT PET was negative.  Patient returns to clinic to review interval oral laboratory and results of bone marrow aspiration and biopsy.  EGD has been rescheduled until after January 1st.  Patient is compliant with use of PPI recommended a during her last office evaluation.  Abdominal complaints appear to be improving.  Patient is able to eat better, though she continues to rely more on liquids than solids.  She is now able to consume protein shakes and reports stabilization of her weight.  No other complaints for pertinent findings on a 14 point review of systems.    Physical examination:  The patient is a well-developed, thin appearing, elderly, white female in no acute distress, who has a weight of 89.5 lb (increased by 0.5 lb).  VITAL SIGNS: Documented  and reviewed this visit.  HEENT: Normocephalic, atraumatic. Oral mucosa pink and moist. Lips without   lesions. Tongue midline. Oropharynx clear. Nonicteric sclerae.   NECK: Supple, no adenopathy. No carotid bruits, thyromegaly or thyroid nodule.   HEART: Regular rate and rhythm without murmur, gallop or rub.   LUNGS: Clear to auscultation bilaterally. Normal respiratory effort.   ABDOMEN:  Soft, nontender, nondistended with positive normoactive bowel sounds,   no hepatosplenomegaly.   EXTREMITIES: No cyanosis, clubbing or edema. Distal pulses are intact.   AXILLAE AND GROIN: No palpable pathologic lymphadenopathy is appreciated.   SKIN: Intact/turgor normal   NEUROLOGIC: Cranial nerves II-XII grossly intact. Motor: Good muscle bulk and   tone. Strength/sensory 5/5 throughout. Gait stable.     Laboratory:  White count 6.8, hemoglobin and hematocrit have improved from 9.7& 30.6 to 10.8& 34.9, platelets 447.  Sodium 135, potassium 4.5, chloride 98, CO2 28, BUN 18, creatinine 0.7, glucose 116, calcium 10, liver function test within normal limits, , GFR is greater than 60.    Impression:  1.  History of marginal zone B-cell non-Hodgkin's lymphoma.  2.  New onset normocytic anemia improved on PPI.  3.  Unexplained weight loss-stable line.  4.  Night sweats.  5.  Abdominal pain in association with nausea emesis-improved on PPI.    Plan:  1.  Continue Prilosec 40 mg daily and Compazine 10 mg every 6 hr as needed for nausea emesis.  2.  Return to clinic following EGD and colonoscopy scheduled in January.  3.  Patient will have interval CBC, CMP, and LDH at that visit.    This note was created using voice recognition software and may contain grammatical errors.

## 2019-12-17 NOTE — TELEPHONE ENCOUNTER
----- Message from WILD Lopez sent at 12/17/2019 10:53 AM CST -----  Please call to inform & review the results with the patient- Remaining stool study showed normal findings.   Continue with previous recommendations.  Thanks,  Leti ROME-C

## 2019-12-20 NOTE — TELEPHONE ENCOUNTER
Spoke with pt and informed of results and recommendations per Nory Thapa NP. Pt verbalized understanding.

## 2019-12-20 NOTE — TELEPHONE ENCOUNTER
Spoke with pt and informed of results and recommendations per Nory Thapa NP. Pt verbalized understanding. Letter with results of CT mailed to address on file per pt request.

## 2019-12-30 ENCOUNTER — TELEPHONE (OUTPATIENT)
Dept: GASTROENTEROLOGY | Facility: CLINIC | Age: 79
End: 2019-12-30

## 2019-12-30 NOTE — TELEPHONE ENCOUNTER
----- Message from Celina Chandra sent at 12/30/2019  3:40 PM CST -----  Contact: Patient  Type: Needs Medical Advice    Who Called:  Patient  Best Call Back Number:   Additional Information: Calling to verify the date and time of her colonoscopy. She advised that she received a message that says she was scheduled for 1/3 but she was told 1/2 and needs to find out a time to make arrangements for her ride.

## 2020-01-02 ENCOUNTER — TELEPHONE (OUTPATIENT)
Dept: GASTROENTEROLOGY | Facility: CLINIC | Age: 80
End: 2020-01-02

## 2020-01-02 ENCOUNTER — ANESTHESIA EVENT (OUTPATIENT)
Dept: ENDOSCOPY | Facility: HOSPITAL | Age: 80
End: 2020-01-02
Payer: MEDICARE

## 2020-01-02 ENCOUNTER — HOSPITAL ENCOUNTER (OUTPATIENT)
Facility: HOSPITAL | Age: 80
Discharge: HOME | End: 2020-01-02
Attending: INTERNAL MEDICINE | Admitting: INTERNAL MEDICINE
Payer: MEDICARE

## 2020-01-02 ENCOUNTER — ANESTHESIA (OUTPATIENT)
Dept: ENDOSCOPY | Facility: HOSPITAL | Age: 80
End: 2020-01-02
Payer: MEDICARE

## 2020-01-02 DIAGNOSIS — R10.9 ABDOMINAL PAIN, UNSPECIFIED ABDOMINAL LOCATION: ICD-10-CM

## 2020-01-02 DIAGNOSIS — R63.4 WEIGHT LOSS: Primary | ICD-10-CM

## 2020-01-02 DIAGNOSIS — R68.81 EARLY SATIETY: ICD-10-CM

## 2020-01-02 DIAGNOSIS — K56.699 COLONIC STRICTURE: Primary | ICD-10-CM

## 2020-01-02 DIAGNOSIS — D50.9 IRON DEFICIENCY ANEMIA, UNSPECIFIED IRON DEFICIENCY ANEMIA TYPE: ICD-10-CM

## 2020-01-02 PROCEDURE — 27201028 HC NEEDLE, SCLERO: Mod: PO | Performed by: INTERNAL MEDICINE

## 2020-01-02 PROCEDURE — 88305 TISSUE EXAM BY PATHOLOGIST: CPT | Mod: 26,,, | Performed by: PATHOLOGY

## 2020-01-02 PROCEDURE — 45381 COLONOSCOPY SUBMUCOUS NJX: CPT | Mod: PO | Performed by: INTERNAL MEDICINE

## 2020-01-02 PROCEDURE — 45380 PR COLONOSCOPY,BIOPSY: ICD-10-PCS | Mod: 52,,, | Performed by: INTERNAL MEDICINE

## 2020-01-02 PROCEDURE — 63600175 PHARM REV CODE 636 W HCPCS: Mod: PO | Performed by: INTERNAL MEDICINE

## 2020-01-02 PROCEDURE — 37000008 HC ANESTHESIA 1ST 15 MINUTES: Mod: PO | Performed by: INTERNAL MEDICINE

## 2020-01-02 PROCEDURE — 43251 EGD REMOVE LESION SNARE: CPT | Mod: PO | Performed by: INTERNAL MEDICINE

## 2020-01-02 PROCEDURE — 88305 TISSUE EXAM BY PATHOLOGIST: CPT | Mod: 59 | Performed by: PATHOLOGY

## 2020-01-02 PROCEDURE — D9220A PRA ANESTHESIA: Mod: CRNA,,, | Performed by: NURSE ANESTHETIST, CERTIFIED REGISTERED

## 2020-01-02 PROCEDURE — D9220A PRA ANESTHESIA: Mod: ANES,,, | Performed by: ANESTHESIOLOGY

## 2020-01-02 PROCEDURE — 37000009 HC ANESTHESIA EA ADD 15 MINS: Mod: PO | Performed by: INTERNAL MEDICINE

## 2020-01-02 PROCEDURE — 88305 TISSUE EXAM BY PATHOLOGIST: ICD-10-PCS | Mod: 26,,, | Performed by: PATHOLOGY

## 2020-01-02 PROCEDURE — D9220A PRA ANESTHESIA: ICD-10-PCS | Mod: CRNA,,, | Performed by: NURSE ANESTHETIST, CERTIFIED REGISTERED

## 2020-01-02 PROCEDURE — 45380 COLONOSCOPY AND BIOPSY: CPT | Mod: 52,,, | Performed by: INTERNAL MEDICINE

## 2020-01-02 PROCEDURE — 43251 EGD REMOVE LESION SNARE: CPT | Mod: 51,,, | Performed by: INTERNAL MEDICINE

## 2020-01-02 PROCEDURE — 45380 COLONOSCOPY AND BIOPSY: CPT | Mod: PO | Performed by: INTERNAL MEDICINE

## 2020-01-02 PROCEDURE — D9220A PRA ANESTHESIA: ICD-10-PCS | Mod: ANES,,, | Performed by: ANESTHESIOLOGY

## 2020-01-02 PROCEDURE — 25000003 PHARM REV CODE 250: Mod: PO | Performed by: INTERNAL MEDICINE

## 2020-01-02 PROCEDURE — 63600175 PHARM REV CODE 636 W HCPCS: Mod: PO | Performed by: NURSE ANESTHETIST, CERTIFIED REGISTERED

## 2020-01-02 PROCEDURE — 43239 EGD BIOPSY SINGLE/MULTIPLE: CPT | Mod: PO | Performed by: INTERNAL MEDICINE

## 2020-01-02 PROCEDURE — 45381 COLONOSCOPY SUBMUCOUS NJX: CPT | Mod: 51,,, | Performed by: INTERNAL MEDICINE

## 2020-01-02 PROCEDURE — 43239 EGD BIOPSY SINGLE/MULTIPLE: CPT | Mod: 59,,, | Performed by: INTERNAL MEDICINE

## 2020-01-02 PROCEDURE — 27201089 HC SNARE, DISP (ANY): Mod: PO | Performed by: INTERNAL MEDICINE

## 2020-01-02 PROCEDURE — 43251 PR EGD, FLEX, W/REMOVAL, TUMOR/POLYP/LESION(S), SNARE: ICD-10-PCS | Mod: 51,,, | Performed by: INTERNAL MEDICINE

## 2020-01-02 PROCEDURE — 27201012 HC FORCEPS, HOT/COLD, DISP: Mod: PO | Performed by: INTERNAL MEDICINE

## 2020-01-02 PROCEDURE — 43239 PR EGD, FLEX, W/BIOPSY, SGL/MULTI: ICD-10-PCS | Mod: 59,,, | Performed by: INTERNAL MEDICINE

## 2020-01-02 PROCEDURE — 45381 PR COLONOSCPY,FLEX,W/DIR SUBMUC INJECT: ICD-10-PCS | Mod: 51,,, | Performed by: INTERNAL MEDICINE

## 2020-01-02 RX ORDER — SODIUM CHLORIDE 0.9 % (FLUSH) 0.9 %
10 SYRINGE (ML) INJECTION EVERY 6 HOURS PRN
Status: DISCONTINUED | OUTPATIENT
Start: 2020-01-02 | End: 2020-01-02 | Stop reason: HOSPADM

## 2020-01-02 RX ORDER — FENTANYL CITRATE 50 UG/ML
25 INJECTION, SOLUTION INTRAMUSCULAR; INTRAVENOUS EVERY 5 MIN PRN
Status: DISCONTINUED | OUTPATIENT
Start: 2020-01-02 | End: 2020-01-02 | Stop reason: HOSPADM

## 2020-01-02 RX ORDER — PROPOFOL 10 MG/ML
VIAL (ML) INTRAVENOUS CONTINUOUS PRN
Status: DISCONTINUED | OUTPATIENT
Start: 2020-01-02 | End: 2020-01-02

## 2020-01-02 RX ORDER — HYDROMORPHONE HYDROCHLORIDE 2 MG/ML
0.2 INJECTION, SOLUTION INTRAMUSCULAR; INTRAVENOUS; SUBCUTANEOUS EVERY 5 MIN PRN
Status: DISCONTINUED | OUTPATIENT
Start: 2020-01-02 | End: 2020-01-02 | Stop reason: HOSPADM

## 2020-01-02 RX ORDER — LIDOCAINE HCL/PF 100 MG/5ML
SYRINGE (ML) INTRAVENOUS
Status: DISCONTINUED | OUTPATIENT
Start: 2020-01-02 | End: 2020-01-02

## 2020-01-02 RX ORDER — FENTANYL CITRATE 50 UG/ML
INJECTION, SOLUTION INTRAMUSCULAR; INTRAVENOUS
Status: DISCONTINUED | OUTPATIENT
Start: 2020-01-02 | End: 2020-01-02

## 2020-01-02 RX ORDER — LIDOCAINE HYDROCHLORIDE 10 MG/ML
1 INJECTION INFILTRATION; PERINEURAL ONCE
Status: COMPLETED | OUTPATIENT
Start: 2020-01-02 | End: 2020-01-02

## 2020-01-02 RX ORDER — SODIUM CHLORIDE, SODIUM LACTATE, POTASSIUM CHLORIDE, CALCIUM CHLORIDE 600; 310; 30; 20 MG/100ML; MG/100ML; MG/100ML; MG/100ML
INJECTION, SOLUTION INTRAVENOUS CONTINUOUS
Status: DISCONTINUED | OUTPATIENT
Start: 2020-01-02 | End: 2020-01-02 | Stop reason: HOSPADM

## 2020-01-02 RX ORDER — OXYCODONE HYDROCHLORIDE 5 MG/1
5 TABLET ORAL
Status: DISCONTINUED | OUTPATIENT
Start: 2020-01-02 | End: 2020-01-02 | Stop reason: HOSPADM

## 2020-01-02 RX ORDER — PROPOFOL 10 MG/ML
VIAL (ML) INTRAVENOUS
Status: DISCONTINUED | OUTPATIENT
Start: 2020-01-02 | End: 2020-01-02

## 2020-01-02 RX ADMIN — PROPOFOL 50 MG: 10 INJECTION, EMULSION INTRAVENOUS at 12:01

## 2020-01-02 RX ADMIN — LIDOCAINE HYDROCHLORIDE 100 MG: 20 INJECTION, SOLUTION INTRAVENOUS at 12:01

## 2020-01-02 RX ADMIN — PROPOFOL 75 MCG/KG/MIN: 10 INJECTION, EMULSION INTRAVENOUS at 12:01

## 2020-01-02 RX ADMIN — PROPOFOL 25 MG: 10 INJECTION, EMULSION INTRAVENOUS at 12:01

## 2020-01-02 RX ADMIN — LIDOCAINE HYDROCHLORIDE: 10 INJECTION, SOLUTION EPIDURAL; INFILTRATION; INTRACAUDAL; PERINEURAL at 11:01

## 2020-01-02 RX ADMIN — SODIUM CHLORIDE, SODIUM LACTATE, POTASSIUM CHLORIDE, AND CALCIUM CHLORIDE: .6; .31; .03; .02 INJECTION, SOLUTION INTRAVENOUS at 11:01

## 2020-01-02 RX ADMIN — PROPOFOL 100 MG: 10 INJECTION, EMULSION INTRAVENOUS at 12:01

## 2020-01-02 RX ADMIN — FENTANYL CITRATE 25 MCG: 50 INJECTION, SOLUTION INTRAMUSCULAR; INTRAVENOUS at 12:01

## 2020-01-02 NOTE — H&P
History & Physical - Short Stay  Gastroenterology      SUBJECTIVE:     Procedure: EGD/Colonoscopy    Chief Complaint/Indication for Procedure: Abdominal pain, weight loss, SPENCER    Abdominal Pain   This is a new problem. Episode onset: started a few weeks ago (in 11/2019) The problem occurs constantly. The problem has been gradually improving. The pain is located in the generalized abdominal region. The quality of the pain is cramping. The abdominal pain does not radiate. Associated symptoms include diarrhea (started with abdominal cramping; bowel movements are 3 times a day of watery stool; denies recent antibiotic/hospitalizaion, foreign travel, ill contacts, or suspect food intake), nausea (occasional; improving since on compazine prn- helping; started medical marijuana orally BID in 9/2019 for neck pain), vomiting (emesis, improving since on compazine; emesis was of sputum and food; denies bright red blood or coffee ground emesis) and weight loss (lost 15 lbs since summer 2019 due to norco use and constipation she had with that and was not eating as much because she felt blaoted at that time).     Associated symptoms comments: Anemia since ~2017 but worsened in 10/2019; having bone marrow biopsy next week with Dr. Fuller. Treatments tried: prilosec 40 mg once daily, probiotic bid. Her past medical history is significant for GERD (denies any recently). There is no history of Crohn's disease, gallstones, pancreatitis or ulcerative colitis.     PTA Medications   Medication Sig    calcium 500 mg Tab Take 1,000 mg by mouth. Takes 2 tablets daily (1000mg)    CANNABIDIOL, CBD, EXTRACT ORAL Take by mouth 2 (two) times daily.     CEREFOLIN NAC, ALGAL OIL, 6 mg-600 mg- 2 mg-90.314 mg Tab TAKE 1 TABLET BY MOUTH ONCE DAILY.    coQ10, ubiquinol, 100 mg Cap Take by mouth 2 (two) times daily.    diclofenac (VOLTAREN) 75 MG EC tablet TAKE 1 TABLET BY MOUTH TWICE A DAY, ANTI-INFLAMMATORY FOR PAIN    glucosamine-chondroitin  500-400 mg tablet Take 1 tablet by mouth 2 (two) times daily.    HERBAL DRUGS ORAL Take 1 tablet by mouth once daily. protandim herbal supplement    HYDROcodone-acetaminophen (NORCO)  mg per tablet Take 1 tablet by mouth every 8 (eight) hours as needed.     losartan (COZAAR) 50 MG tablet Take 1 tablet (50 mg total) by mouth once daily.    magnesium 30 mg Tab Take by mouth once.    omega 3-dha-epa-fish oil (FISH OIL) 1,000 mg (120 mg-180 mg) Cap Take 1 capsule by mouth once daily.    omeprazole (PRILOSEC) 40 MG capsule Take 1 capsule (40 mg total) by mouth once daily.    polyethylene glycol (MIRALAX) 17 gram PwPk Take by mouth daily as needed.    prochlorperazine (COMPAZINE) 10 MG tablet Take 1 tablet (10 mg total) by mouth every 6 (six) hours as needed.    Saccharomyces boulardii (FLORASTOR) 250 mg capsule Take 250 mg by mouth 2 (two) times daily.    tiZANidine (ZANAFLEX) 4 MG tablet Take 4 mg by mouth 3 (three) times daily as needed.    trazodone (DESYREL) 50 MG tablet Take 50 mg by mouth every evening.    TURMERIC ORAL Take 30 mg by mouth once daily.    vit I6-ev-cdxfilpu-me-B12-ALA (NUFOLA) 25-3.75-1-300 mg Cap Take 1 capsule by mouth once daily.    vitamin D 1000 units Tab Take 185 mg by mouth once daily.    vitamins  A,C,E-zinc-copper (PRESERVISION AREDS) 14,320-226-200 unit-mg-unit Cap Take by mouth. Take 1 capsule once daily       Review of patient's allergies indicates:  No Known Allergies     Past Medical History:   Diagnosis Date    Anemia     Cancer 2009    non-hodkins lymphoma-remission 5 yrs in 4/14    Eye hemorrhage     12/2015    Osteoporosis     Sleep apnea     uses dental mouthpiece, no cpap/bipap     Past Surgical History:   Procedure Laterality Date    BONE MARROW ASPIRATION Bilateral 12/2/2019    Procedure: ASPIRATION, BONE MARROW;  Surgeon: Guevara Fuller MD;  Location: [facility];  Service: Oncology;  Laterality: Bilateral;    COLONOSCOPY  ~2004    normal findings per  patient report    DILATION AND CURETTAGE OF UTERUS      TONSILLECTOMY       Family History   Problem Relation Age of Onset    Diabetes Mother     Heart failure Mother     Cancer Mother         lymphoma non-hodkins    Heart attack Father     Diabetes Brother     Cancer Maternal Aunt         bladder cancer    Cancer Maternal Uncle     Diabetes Maternal Uncle     Diabetes Maternal Grandmother     Cancer Maternal Grandfather         prostate cancer    Colon cancer Neg Hx     Crohn's disease Neg Hx     Esophageal cancer Neg Hx     Stomach cancer Neg Hx     Ulcerative colitis Neg Hx      Social History     Tobacco Use    Smoking status: Never Smoker    Smokeless tobacco: Never Used   Substance Use Topics    Alcohol use: Not Currently     Comment: one glass of wine a night, not every night    Drug use: Yes     Types: Marijuana     Comment: taking medical marijuana oil bid orally     OBJECTIVE:     Vital Signs (Most Recent)  Temp: 97.5 °F (36.4 °C) (01/02/20 1119)  Pulse: 107(anesthesia aware - ok to proceed) (01/02/20 1119)  Resp: 16 (01/02/20 1119)  BP: 139/71 (01/02/20 1119)  SpO2: 99 % (01/02/20 1119)    Physical Exam:                                                       GENERAL:  Comfortable, in no acute distress.                                 HEENT EXAM:  Nonicteric.  No adenopathy.  Oropharynx is clear.               NECK:  Supple.                                                               LUNGS:  Clear.                                                               CARDIAC:  Regular rate and rhythm.  S1, S2.  No murmur.                      ABDOMEN:  Soft, positive bowel sounds, nontender.  No hepatosplenomegaly or masses.  No rebound or guarding.                                             EXTREMITIES:  No edema.     MENTAL STATUS:  Normal, alert and oriented.      ASSESSMENT/PLAN:     Assessment: Abdominal pain, weight loss, SPENCER    Plan:EGD/Colonoscopy    Anesthesia Plan: General    ASA  Grade: ASA 2 - Patient with mild systemic disease with no functional limitations    MALLAMPATI SCORE:  II (hard and soft palate, upper portion of tonsils anduvula visible)

## 2020-01-02 NOTE — ANESTHESIA POSTPROCEDURE EVALUATION
Anesthesia Post Evaluation    Patient: Anabell Madrid    Procedure(s) Performed: Procedure(s) (LRB):  EGD (ESOPHAGOGASTRODUODENOSCOPY) (N/A)  COLONOSCOPY (N/A)    Final Anesthesia Type: general    Patient location during evaluation: PACU  Patient participation: Yes- Able to Participate  Level of consciousness: awake and alert  Post-procedure vital signs: reviewed and stable  Pain management: adequate  Airway patency: patent    PONV status at discharge: No PONV  Anesthetic complications: no      Cardiovascular status: blood pressure returned to baseline and hemodynamically stable  Respiratory status: unassisted  Hydration status: euvolemic  Follow-up not needed.          Vitals Value Taken Time   /68 1/2/2020  1:00 PM   Temp 36.4 °C (97.5 °F) 1/2/2020 11:19 AM   Pulse 86 1/2/2020  1:00 PM   Resp 20 1/2/2020  1:00 PM   SpO2 95 % 1/2/2020  1:00 PM         No case tracking events are documented in the log.      Pain/Yuan Score: Yuan Score: 10 (1/2/2020  1:04 PM)

## 2020-01-02 NOTE — TELEPHONE ENCOUNTER
----- Message from Zander Casper MD sent at 1/2/2020  2:38 PM CST -----  Please schedule CT enterography for patient and notify her of date/time.

## 2020-01-02 NOTE — PROVATION PATIENT INSTRUCTIONS
Discharge Summary/Instructions after an Endoscopic Procedure  Patient Name: Anabell Madrid  Patient MRN: [id number]  Patient YOB: 1940 Thursday, January 02, 2020  Zander Casper MD  RESTRICTIONS:  During your procedure today, you received medications for sedation.  These   medications may affect your judgment, balance and coordination.  Therefore,   for 24 hours, you have the following restrictions:   - DO NOT drive a car, operate machinery, make legal/financial decisions,   sign important papers or drink alcohol.    ACTIVITY:  Today: no heavy lifting, straining or running due to procedural   sedation/anesthesia.  The following day: return to full activity including work.  DIET:  Eat and drink normally unless instructed otherwise.     TREATMENT FOR COMMON SIDE EFFECTS:  - Mild abdominal pain, nausea, belching, bloating or excessive gas:  rest,   eat lightly and use a heating pad.  - Sore Throat: treat with throat lozenges and/or gargle with warm salt   water.  - Because air was used during the procedure, expelling large amounts of air   from your rectum or belching is normal.  - If a bowel prep was taken, you may not have a bowel movement for 1-3 days.    This is normal.  SYMPTOMS TO WATCH FOR AND REPORT TO YOUR PHYSICIAN:  1. Abdominal pain or bloating, other than gas cramps.  2. Chest pain.  3. Back pain.  4. Signs of infection such as: chills or fever occurring within 24 hours   after the procedure.  5. Rectal bleeding, which would show as bright red, maroon, or black stools.   (A tablespoon of blood from the rectum is not serious, especially if   hemorrhoids are present.)  6. Vomiting.  7. Weakness or dizziness.  GO DIRECTLY TO THE NEAREST EMERGENCY ROOM IF YOU HAVE ANY OF THE FOLLOWING:      Difficulty breathing              Chills and/or fever over 101 F   Persistent vomiting and/or vomiting blood   Severe abdominal pain   Severe chest pain   Black, tarry stools   Bleeding- more than one  tablespoon   Any other symptom or condition that you feel may need urgent attention  Your doctor recommends these additional instructions:  If any biopsies were taken, your doctors clinic will contact you in 1 to 2   weeks with any results.  You are being discharged to home.   Advance your diet as tolerated.  For questions, problems or results please call your physician - Zander Casper MD at Work:  [phone].  EMERGENCY PHONE NUMBER: [phone], LAB RESULTS: [phone]  IF A COMPLICATION OR EMERGENCY SITUATION ARISES AND YOU ARE UNABLE TO REACH   YOUR PHYSICIAN - GO DIRECTLY TO THE EMERGENCY ROOM.  ___________________________________________  Nurse Signature  ___________________________________________  Patient/Designated Responsible Party Signature  Zander Casper MD  1/2/2020 12:55:53 PM  This report has been verified and signed electronically.  [employer]ATION

## 2020-01-02 NOTE — ANESTHESIA PREPROCEDURE EVALUATION
01/02/2020  Anabell Madrid is a 79 y.o., female.    Anesthesia Evaluation    I have reviewed the Patient Summary Reports.    I have reviewed the Nursing Notes.      Review of Systems  Anesthesia Hx:  No problems with previous Anesthesia    Cardiovascular:   Hypertension, well controlled    Hepatic/GI:   GERD, well controlled        Physical Exam  General:  Well nourished    Airway/Jaw/Neck:  Airway Findings: Mallampati: II                Anesthesia Plan  Type of Anesthesia, risks & benefits discussed:  Anesthesia Type:  general  Patient's Preference:   Intra-op Monitoring Plan:   Intra-op Monitoring Plan Comments:   Post Op Pain Control Plan:   Post Op Pain Control Plan Comments:   Induction:   IV  Beta Blocker:  Patient is not currently on a Beta-Blocker (No further documentation required).       Informed Consent: Patient understands risks and agrees with Anesthesia plan.  Questions answered. Anesthesia consent signed with patient.  ASA Score: 2     Day of Surgery Review of History & Physical:    H&P update referred to the surgeon.         Ready For Surgery From Anesthesia Perspective.

## 2020-01-02 NOTE — TRANSFER OF CARE
"Anesthesia Transfer of Care Note    Patient: Anabell Madrid    Procedure(s) Performed: Procedure(s) (LRB):  EGD (ESOPHAGOGASTRODUODENOSCOPY) (N/A)  COLONOSCOPY (N/A)    Patient location: PACU    Anesthesia Type: general    Transport from OR: Transported from OR on room air with adequate spontaneous ventilation    Post pain: adequate analgesia    Post assessment: no apparent anesthetic complications    Post vital signs: stable    Level of consciousness: responds to stimulation    Nausea/Vomiting: no nausea/vomiting    Transfer of care protocol was followed      Last vitals:   Visit Vitals  /71 (BP Location: Right arm, Patient Position: Sitting)   Pulse 107   Temp 36.4 °C (97.5 °F) (Skin)   Resp 16   Ht 4' 11" (1.499 m)   Wt 40.8 kg (90 lb)   SpO2 99%   Breastfeeding? No   BMI 18.18 kg/m²     "

## 2020-01-02 NOTE — PROVATION PATIENT INSTRUCTIONS
Discharge Summary/Instructions after an Endoscopic Procedure  Patient Name: Anabell Madrid  Patient MRN: [id number]  Patient YOB: 1940 Thursday, January 02, 2020  Zander Casper MD  RESTRICTIONS:  During your procedure today, you received medications for sedation.  These   medications may affect your judgment, balance and coordination.  Therefore,   for 24 hours, you have the following restrictions:   - DO NOT drive a car, operate machinery, make legal/financial decisions,   sign important papers or drink alcohol.    ACTIVITY:  Today: no heavy lifting, straining or running due to procedural   sedation/anesthesia.  The following day: return to full activity including work.  DIET:  Eat and drink normally unless instructed otherwise.     TREATMENT FOR COMMON SIDE EFFECTS:  - Mild abdominal pain, nausea, belching, bloating or excessive gas:  rest,   eat lightly and use a heating pad.  - Sore Throat: treat with throat lozenges and/or gargle with warm salt   water.  - Because air was used during the procedure, expelling large amounts of air   from your rectum or belching is normal.  - If a bowel prep was taken, you may not have a bowel movement for 1-3 days.    This is normal.  SYMPTOMS TO WATCH FOR AND REPORT TO YOUR PHYSICIAN:  1. Abdominal pain or bloating, other than gas cramps.  2. Chest pain.  3. Back pain.  4. Signs of infection such as: chills or fever occurring within 24 hours   after the procedure.  5. Rectal bleeding, which would show as bright red, maroon, or black stools.   (A tablespoon of blood from the rectum is not serious, especially if   hemorrhoids are present.)  6. Vomiting.  7. Weakness or dizziness.  GO DIRECTLY TO THE NEAREST EMERGENCY ROOM IF YOU HAVE ANY OF THE FOLLOWING:      Difficulty breathing              Chills and/or fever over 101 F   Persistent vomiting and/or vomiting blood   Severe abdominal pain   Severe chest pain   Black, tarry stools   Bleeding- more than one  tablespoon   Any other symptom or condition that you feel may need urgent attention  Your doctor recommends these additional instructions:  If any biopsies were taken, your doctors clinic will contact you in 1 to 2   weeks with any results.  You are being discharged to home.   Advance your diet as tolerated.   Continue your present medications.   We are waiting for your pathology results.   Written discharge instructions were provided to you.   Your physician has recommended a repeat colonoscopy at appointment to be   scheduled because today's examination was incomplete.  For questions, problems or results please call your physician - Zander Casper MD at Work:  [phone].  EMERGENCY PHONE NUMBER: [phone], LAB RESULTS: [phone]  IF A COMPLICATION OR EMERGENCY SITUATION ARISES AND YOU ARE UNABLE TO REACH   YOUR PHYSICIAN - GO DIRECTLY TO THE EMERGENCY ROOM.  ___________________________________________  Nurse Signature  ___________________________________________  Patient/Designated Responsible Party Signature  Zander Casper MD  1/2/2020 1:21:45 PM  This report has been verified and signed electronically.  [employer]ATION

## 2020-01-03 VITALS
HEIGHT: 59 IN | BODY MASS INDEX: 18.14 KG/M2 | DIASTOLIC BLOOD PRESSURE: 68 MMHG | WEIGHT: 90 LBS | HEART RATE: 89 BPM | OXYGEN SATURATION: 98 % | TEMPERATURE: 98 F | RESPIRATION RATE: 16 BRPM | SYSTOLIC BLOOD PRESSURE: 152 MMHG

## 2020-01-06 ENCOUNTER — TELEPHONE (OUTPATIENT)
Dept: GASTROENTEROLOGY | Facility: CLINIC | Age: 80
End: 2020-01-06

## 2020-01-06 DIAGNOSIS — K56.699 COLONIC STRICTURE: Primary | ICD-10-CM

## 2020-01-06 NOTE — TELEPHONE ENCOUNTER
----- Message from Zander Casper MD sent at 1/6/2020 12:32 PM CST -----  Please schedule both radiology procedures. Barium enema and CT enterography when able. Thank you.

## 2020-01-06 NOTE — TELEPHONE ENCOUNTER
Spoke w/ pt to schedule CT. Informed pt to contact radiology to schedule barium enema. Pt verbalized understanding.

## 2020-01-07 ENCOUNTER — HOSPITAL ENCOUNTER (OUTPATIENT)
Dept: RADIOLOGY | Facility: HOSPITAL | Age: 80
Discharge: HOME | End: 2020-01-07
Attending: INTERNAL MEDICINE
Payer: MEDICARE

## 2020-01-07 DIAGNOSIS — R10.9 ABDOMINAL PAIN, UNSPECIFIED ABDOMINAL LOCATION: ICD-10-CM

## 2020-01-07 DIAGNOSIS — R68.81 EARLY SATIETY: ICD-10-CM

## 2020-01-07 DIAGNOSIS — K56.699 COLONIC STRICTURE: ICD-10-CM

## 2020-01-07 PROCEDURE — 74177 CT ABD & PELVIS W/CONTRAST: CPT | Mod: TC,PO

## 2020-01-07 PROCEDURE — 25500020 PHARM REV CODE 255: Mod: PO | Performed by: INTERNAL MEDICINE

## 2020-01-07 PROCEDURE — 74177 CT ABD & PELVIS W/CONTRAST: CPT | Mod: 26,,, | Performed by: RADIOLOGY

## 2020-01-07 PROCEDURE — 74177 CT ENTEROGRAPHY ABD_PELVIS WITH CONTRAST: ICD-10-PCS | Mod: 26,,, | Performed by: RADIOLOGY

## 2020-01-07 RX ADMIN — IOHEXOL 75 ML: 350 INJECTION, SOLUTION INTRAVENOUS at 10:01

## 2020-01-08 LAB
FINAL PATHOLOGIC DIAGNOSIS: NORMAL
GROSS: NORMAL

## 2020-01-09 ENCOUNTER — LAB VISIT (OUTPATIENT)
Dept: LAB | Facility: HOSPITAL | Age: 80
End: 2020-01-09
Attending: INTERNAL MEDICINE
Payer: MEDICARE

## 2020-01-09 DIAGNOSIS — R19.7 DIARRHEA, UNSPECIFIED TYPE: ICD-10-CM

## 2020-01-09 DIAGNOSIS — R63.4 WEIGHT LOSS: ICD-10-CM

## 2020-01-09 DIAGNOSIS — E04.2 MULTINODULAR GOITER: ICD-10-CM

## 2020-01-09 DIAGNOSIS — C85.80 MARGINAL ZONE B-CELL LYMPHOMA: ICD-10-CM

## 2020-01-09 DIAGNOSIS — E87.1 HYPONATREMIA: ICD-10-CM

## 2020-01-09 LAB
ALBUMIN SERPL BCP-MCNC: 2.4 G/DL (ref 3.5–5.2)
ALBUMIN SERPL BCP-MCNC: 2.4 G/DL (ref 3.5–5.2)
ALP SERPL-CCNC: 284 U/L (ref 55–135)
ALP SERPL-CCNC: 284 U/L (ref 55–135)
ALT SERPL W/O P-5'-P-CCNC: 81 U/L (ref 10–44)
ALT SERPL W/O P-5'-P-CCNC: 81 U/L (ref 10–44)
ANION GAP SERPL CALC-SCNC: 10 MMOL/L (ref 8–16)
ANION GAP SERPL CALC-SCNC: 10 MMOL/L (ref 8–16)
AST SERPL-CCNC: 29 U/L (ref 10–40)
AST SERPL-CCNC: 29 U/L (ref 10–40)
BASOPHILS # BLD AUTO: ABNORMAL K/UL (ref 0–0.2)
BASOPHILS NFR BLD: 0 % (ref 0–1.9)
BILIRUB SERPL-MCNC: 0.4 MG/DL (ref 0.1–1)
BILIRUB SERPL-MCNC: 0.4 MG/DL (ref 0.1–1)
BUN SERPL-MCNC: 23 MG/DL (ref 8–23)
BUN SERPL-MCNC: 23 MG/DL (ref 8–23)
CALCIUM SERPL-MCNC: 10.2 MG/DL (ref 8.7–10.5)
CALCIUM SERPL-MCNC: 10.2 MG/DL (ref 8.7–10.5)
CHLORIDE SERPL-SCNC: 99 MMOL/L (ref 95–110)
CHLORIDE SERPL-SCNC: 99 MMOL/L (ref 95–110)
CO2 SERPL-SCNC: 25 MMOL/L (ref 23–29)
CO2 SERPL-SCNC: 25 MMOL/L (ref 23–29)
CREAT SERPL-MCNC: 0.8 MG/DL (ref 0.5–1.4)
DIFFERENTIAL METHOD: ABNORMAL
EOSINOPHIL # BLD AUTO: ABNORMAL K/UL (ref 0–0.5)
EOSINOPHIL NFR BLD: 2 % (ref 0–8)
ERYTHROCYTE [DISTWIDTH] IN BLOOD BY AUTOMATED COUNT: 17.2 % (ref 11.5–14.5)
EST. GFR  (AFRICAN AMERICAN): >60 ML/MIN/1.73 M^2
EST. GFR  (NON AFRICAN AMERICAN): >60 ML/MIN/1.73 M^2
GLUCOSE SERPL-MCNC: 146 MG/DL (ref 70–110)
GLUCOSE SERPL-MCNC: 146 MG/DL (ref 70–110)
HCT VFR BLD AUTO: 29.2 % (ref 37–48.5)
HGB BLD-MCNC: 9.6 G/DL (ref 12–16)
IGA SERPL-MCNC: 30 MG/DL (ref 40–350)
LDH SERPL L TO P-CCNC: 160 U/L (ref 110–260)
LYMPHOCYTES # BLD AUTO: ABNORMAL K/UL (ref 1–4.8)
LYMPHOCYTES NFR BLD: 1 % (ref 18–48)
MCH RBC QN AUTO: 28 PG (ref 27–31)
MCHC RBC AUTO-ENTMCNC: 32.9 G/DL (ref 32–36)
MCV RBC AUTO: 85 FL (ref 82–98)
MONOCYTES # BLD AUTO: ABNORMAL K/UL (ref 0.3–1)
MONOCYTES NFR BLD: 2 % (ref 4–15)
NEUTROPHILS NFR BLD: 85 % (ref 38–73)
NEUTS BAND NFR BLD MANUAL: 10 %
PLATELET # BLD AUTO: 665 K/UL (ref 150–350)
PLATELET BLD QL SMEAR: ABNORMAL
PMV BLD AUTO: 8.5 FL (ref 9.2–12.9)
POTASSIUM SERPL-SCNC: 3.7 MMOL/L (ref 3.5–5.1)
POTASSIUM SERPL-SCNC: 3.7 MMOL/L (ref 3.5–5.1)
PROT SERPL-MCNC: 6.7 G/DL (ref 6–8.4)
PROT SERPL-MCNC: 6.7 G/DL (ref 6–8.4)
RBC # BLD AUTO: 3.43 M/UL (ref 4–5.4)
SODIUM SERPL-SCNC: 134 MMOL/L (ref 136–145)
SODIUM SERPL-SCNC: 134 MMOL/L (ref 136–145)
TSH SERPL DL<=0.005 MIU/L-ACNC: 0.98 UIU/ML (ref 0.4–4)
WBC # BLD AUTO: 25.99 K/UL (ref 3.9–12.7)

## 2020-01-09 PROCEDURE — 83615 LACTATE (LD) (LDH) ENZYME: CPT | Mod: PO

## 2020-01-09 PROCEDURE — 36415 COLL VENOUS BLD VENIPUNCTURE: CPT | Mod: PO

## 2020-01-09 PROCEDURE — 80053 COMPREHEN METABOLIC PANEL: CPT

## 2020-01-09 PROCEDURE — 85007 BL SMEAR W/DIFF WBC COUNT: CPT | Mod: PO

## 2020-01-09 PROCEDURE — 85027 COMPLETE CBC AUTOMATED: CPT | Mod: PO

## 2020-01-09 PROCEDURE — 83516 IMMUNOASSAY NONANTIBODY: CPT

## 2020-01-09 PROCEDURE — 84443 ASSAY THYROID STIM HORMONE: CPT

## 2020-01-09 PROCEDURE — 82784 ASSAY IGA/IGD/IGG/IGM EACH: CPT

## 2020-01-10 ENCOUNTER — HOSPITAL ENCOUNTER (OUTPATIENT)
Dept: RADIOLOGY | Facility: HOSPITAL | Age: 80
Discharge: HOME | End: 2020-01-10
Attending: INTERNAL MEDICINE
Payer: MEDICARE

## 2020-01-10 DIAGNOSIS — K56.699 COLONIC STRICTURE: ICD-10-CM

## 2020-01-10 PROCEDURE — 74280 X-RAY XM COLON 2CNTRST STD: CPT | Mod: TC

## 2020-01-10 PROCEDURE — 74280 FL BARIUM ENEMA AIR CONTRAST: ICD-10-PCS | Mod: 26,,, | Performed by: RADIOLOGY

## 2020-01-10 PROCEDURE — 74280 X-RAY XM COLON 2CNTRST STD: CPT | Mod: 26,,, | Performed by: RADIOLOGY

## 2020-01-13 LAB — TTG IGA SER-ACNC: 2 UNITS

## 2020-01-14 ENCOUNTER — TELEPHONE (OUTPATIENT)
Dept: GASTROENTEROLOGY | Facility: CLINIC | Age: 80
End: 2020-01-14

## 2020-01-14 DIAGNOSIS — R76.8 LOW SERUM IGA FOR AGE: Primary | ICD-10-CM

## 2020-01-15 ENCOUNTER — OFFICE VISIT (OUTPATIENT)
Dept: HEMATOLOGY/ONCOLOGY | Facility: CLINIC | Age: 80
End: 2020-01-15
Payer: MEDICARE

## 2020-01-15 ENCOUNTER — DOCUMENTATION ONLY (OUTPATIENT)
Dept: INFUSION THERAPY | Facility: HOSPITAL | Age: 80
End: 2020-01-15

## 2020-01-15 VITALS
WEIGHT: 89.81 LBS | HEART RATE: 109 BPM | HEIGHT: 60 IN | TEMPERATURE: 98 F | BODY MASS INDEX: 17.63 KG/M2 | OXYGEN SATURATION: 97 % | RESPIRATION RATE: 18 BRPM | SYSTOLIC BLOOD PRESSURE: 127 MMHG | DIASTOLIC BLOOD PRESSURE: 54 MMHG

## 2020-01-15 DIAGNOSIS — D50.8 OTHER IRON DEFICIENCY ANEMIA: ICD-10-CM

## 2020-01-15 DIAGNOSIS — J18.9 PNEUMONIA, UNSPECIFIED ORGANISM: Primary | ICD-10-CM

## 2020-01-15 PROBLEM — D50.9 IDA (IRON DEFICIENCY ANEMIA): Status: ACTIVE | Noted: 2020-01-15

## 2020-01-15 PROCEDURE — 99215 OFFICE O/P EST HI 40 MIN: CPT | Mod: PBBFAC,PN | Performed by: INTERNAL MEDICINE

## 2020-01-15 PROCEDURE — 99215 PR OFFICE/OUTPT VISIT, EST, LEVL V, 40-54 MIN: ICD-10-PCS | Mod: S$PBB,,, | Performed by: INTERNAL MEDICINE

## 2020-01-15 PROCEDURE — 99999 PR PBB SHADOW E&M-EST. PATIENT-LVL V: ICD-10-PCS | Mod: PBBFAC,,, | Performed by: INTERNAL MEDICINE

## 2020-01-15 PROCEDURE — 1159F PR MEDICATION LIST DOCUMENTED IN MEDICAL RECORD: ICD-10-PCS | Mod: ,,, | Performed by: INTERNAL MEDICINE

## 2020-01-15 PROCEDURE — 1126F PR PAIN SEVERITY QUANTIFIED, NO PAIN PRESENT: ICD-10-PCS | Mod: ,,, | Performed by: INTERNAL MEDICINE

## 2020-01-15 PROCEDURE — 99999 PR PBB SHADOW E&M-EST. PATIENT-LVL V: CPT | Mod: PBBFAC,,, | Performed by: INTERNAL MEDICINE

## 2020-01-15 PROCEDURE — 99215 OFFICE O/P EST HI 40 MIN: CPT | Mod: S$PBB,,, | Performed by: INTERNAL MEDICINE

## 2020-01-15 PROCEDURE — 1159F MED LIST DOCD IN RCRD: CPT | Mod: ,,, | Performed by: INTERNAL MEDICINE

## 2020-01-15 PROCEDURE — 1126F AMNT PAIN NOTED NONE PRSNT: CPT | Mod: ,,, | Performed by: INTERNAL MEDICINE

## 2020-01-15 RX ORDER — FAMOTIDINE 10 MG/ML
20 INJECTION INTRAVENOUS DAILY
Status: CANCELLED
Start: 2020-01-24

## 2020-01-15 RX ORDER — CLARITHROMYCIN 500 MG/1
500 TABLET, FILM COATED ORAL 2 TIMES DAILY
Qty: 28 TABLET | Refills: 0 | Status: SHIPPED | OUTPATIENT
Start: 2020-01-15 | End: 2020-01-29

## 2020-01-15 RX ORDER — HEPARIN 100 UNIT/ML
500 SYRINGE INTRAVENOUS
Status: CANCELLED | OUTPATIENT
Start: 2020-01-24

## 2020-01-15 RX ORDER — SODIUM CHLORIDE 0.9 % (FLUSH) 0.9 %
10 SYRINGE (ML) INJECTION
Status: CANCELLED | OUTPATIENT
Start: 2020-01-24

## 2020-01-15 NOTE — PROGRESS NOTES
[1/15/2020 2:20 PM]  Maribell Alexander:    need [facility] dates for [id number] Julius, please     [1/15/2020 2:23 PM]    n [id number] 01/24/20@2:30 and 01/27/20 @2:30

## 2020-01-15 NOTE — PROGRESS NOTES
History of present illness:  The patient is an 79-year-old white female who relocated here from [place].  She was recently seen by Jacob De La O in order to establish care in our clinic.  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  She was diagnosed in 2009, treated with Treanda/Rituxan, attained remission, and completed 2 years of maintenance therapy with Rituxan.  She has remained without evidence of disease recurrence.  At the time of her clinic appointment she had complaints of a 10 lb weight loss over the summer which she could not explain.  She was also experiencing sweats.  She denies lymphadenopathy and pruritus.  She has not experience fever.  She denies any signs or symptoms of GI bleeding. Interval CT PET was negative.  Bone marrow aspiration and biopsy were negative.  Patient was subsequently sent for upper and lower endoscopy.  She returns to clinic to discuss results.  Since endoscopy, the patient has had unrelenting cough, dyspnea exacerbated by exertion, and generalized weakness.  Patient is also experiencing ulceration on the inside of her lower lip which she finds painful in unresponsive to over-the-counter remedies.  No other complaints for pertinent findings on a 14 point review of systems.    Physical examination:  The patient is a well-developed, thin appearing, elderly, white female in no acute distress, who has a weight of 89.5 lb (stable).  VITAL SIGNS: Documented  and reviewed this visit.  HEENT: Normocephalic, atraumatic. Oral mucosa pink and moist. Lips without   lesions. Tongue midline.  Inner aspect of patient's lower lobe has ulcerated lesions without superimposed thrush or hemorrhage. Nonicteric sclerae.   NECK: Supple, no adenopathy. No carotid bruits, thyromegaly or thyroid nodule.   HEART: Regular rate and rhythm without murmur, gallop or rub.   LUNGS: Clear to auscultation bilaterally. Normal respiratory effort.    ABDOMEN: Soft, nontender, nondistended with positive normoactive bowel sounds,   no hepatosplenomegaly.   EXTREMITIES: No cyanosis, clubbing or edema. Distal pulses are intact.   AXILLAE AND GROIN: No palpable pathologic lymphadenopathy is appreciated.   SKIN: Intact/turgor normal   NEUROLOGIC: Cranial nerves II-XII grossly intact. Motor: Good muscle bulk and   tone. Strength/sensory 5/5 throughout. Gait stable.     Laboratory:  White count 26, hemoglobin 9.6, hematocrit 29.2, platelets 665, absolute neutrophil count is 24,690.  Sodium 134, potassium 3.7, chloride 99, CO2 25, BUN 23, creatinine 0.8, glucose 146, calcium 10.2, alkaline phosphatase 284, total protein 6.7, albumin 2.4, total bilirubin 0.4, AST 29, ALT 81, GFR greater than 60.  Serum iron 93, TIBC 396, ferritin 6, saturated iron 23%, soluble transferrin receptor 3.2, folate greater than 20, B12 greater than 1000.    EGD:  - Non-obstructing Schatzki ring.  - Small hiatal hernia.  - Erythematous mucosa in the antrum. Biopsied to evaluate for h.pylori.  - Multiple gastric polyps. Resected and retrieved.  - Non-bleeding duodenal diverticulum.  - Mucosal changes in the area of the papilla    Colonoscopy:  The perianal and digital rectal examinations were normal.       A benign-appearing, intrinsic severe stenosis measuring of unknown        length x 5 mm (inner diameter) was found in the transverse colon and        was non-traversed. Biopsies were taken with a cold forceps for        histology. Verification of patient identification for the specimen        was done. Estimated blood loss was minimal. Area was tattooed with        an injection of 3 mL of Spot (carbon black).    Barium enema with air contrast:  Very redundant and tortuous colon.  No obstructing or constricting lesions or polyps are identified.  Three tiny and 1 small diverticulum is noted in the sigmoid colon.    CT enterography:  CT enterography evidence of Crohn's disease is not identified.   Bowel distention air-fluid levels or masses are not seen.  Two hypodensities 1 in the cervix and 1 in the uterine fundus are noted.  This may represent a nabothian cyst and fibroid but ultrasound evaluation is recommended.  New multiple patches of peripheral consolidated infiltrate are identified in the lung bases bilaterally.  Recommend further evaluation of the chest.    Pathology from endoscopy:  1.  FRAGMENTS OF SMALL INTESTINAL TYPE MUCOSA WITH NO ACTIVE INFLAMMATION,   ULCERATION OR DYSPLASIA IDENTIFIED.   2.  FRAGMENTS OF NONNEOPLASTIC GASTRIC MUCOSA WITH NO ACTIVE INFLAMMATION,   EVIDENCE OF H PYLORI OR DYSPLASIA IDENTIFIED.   3.  FRAGMENTS OF NONNEOPLASTIC GASTRIC MUCOSA   4.  SMALL FRAGMENTS OF NONNEOPLASTIC COLONIC MUCOSA WITH NO ACTIVE   INFLAMMATION, ULCERATION OR DYSPLASIA IDENTIFIED.      Impression:  1.  History of marginal zone B-cell non-Hodgkin's lymphoma.  2.  Iron deficiency anemia.  3.  Unexplained weight loss-stable.  4.  Night sweats.  5.  Abdominal pain in association with nausea emesis-improved on PPI.   6.  Mouth ulcers  7.  Pneumonia.    Plan:  1.  Continue Prilosec 40 mg daily and Compazine 10 mg every 6 hr as needed for nausea emesis.  2.  Intravenous iron replacement with Feraheme in typical premedications at earliest convenience.  3.  Biaxin 500 mg p.o. twice daily for 14 days for treatment of pneumonia.  4.  Dr. GRAHAM's mouthwash swish and swallow for management 6.  5.  CT of the chest with contrast for further characterization of pneumonia.  Patient will return to clinic to review scans results.    This note was created using voice recognition software and may contain grammatical errors.

## 2020-01-16 ENCOUNTER — OFFICE VISIT (OUTPATIENT)
Dept: ENDOCRINOLOGY | Facility: CLINIC | Age: 80
End: 2020-01-16
Payer: MEDICARE

## 2020-01-16 ENCOUNTER — HOSPITAL ENCOUNTER (OUTPATIENT)
Dept: RADIOLOGY | Facility: HOSPITAL | Age: 80
Discharge: HOME | End: 2020-01-16
Attending: INTERNAL MEDICINE
Payer: MEDICARE

## 2020-01-16 VITALS
SYSTOLIC BLOOD PRESSURE: 110 MMHG | BODY MASS INDEX: 17.63 KG/M2 | HEIGHT: 60 IN | DIASTOLIC BLOOD PRESSURE: 60 MMHG | HEART RATE: 117 BPM | OXYGEN SATURATION: 97 % | WEIGHT: 89.81 LBS

## 2020-01-16 DIAGNOSIS — J18.9 PNEUMONIA, UNSPECIFIED ORGANISM: ICD-10-CM

## 2020-01-16 DIAGNOSIS — E87.1 HYPONATREMIA: Primary | ICD-10-CM

## 2020-01-16 DIAGNOSIS — E04.2 MULTINODULAR GOITER: ICD-10-CM

## 2020-01-16 DIAGNOSIS — I10 BENIGN ESSENTIAL HTN: ICD-10-CM

## 2020-01-16 DIAGNOSIS — K56.699 COLONIC STRICTURE: Primary | ICD-10-CM

## 2020-01-16 PROCEDURE — 99999 PR PBB SHADOW E&M-EST. PATIENT-LVL III: CPT | Mod: PBBFAC,,, | Performed by: INTERNAL MEDICINE

## 2020-01-16 PROCEDURE — 1126F AMNT PAIN NOTED NONE PRSNT: CPT | Mod: ,,, | Performed by: INTERNAL MEDICINE

## 2020-01-16 PROCEDURE — 1159F PR MEDICATION LIST DOCUMENTED IN MEDICAL RECORD: ICD-10-PCS | Mod: ,,, | Performed by: INTERNAL MEDICINE

## 2020-01-16 PROCEDURE — 99214 OFFICE O/P EST MOD 30 MIN: CPT | Mod: S$PBB,,, | Performed by: INTERNAL MEDICINE

## 2020-01-16 PROCEDURE — 1126F PR PAIN SEVERITY QUANTIFIED, NO PAIN PRESENT: ICD-10-PCS | Mod: ,,, | Performed by: INTERNAL MEDICINE

## 2020-01-16 PROCEDURE — 1159F MED LIST DOCD IN RCRD: CPT | Mod: ,,, | Performed by: INTERNAL MEDICINE

## 2020-01-16 PROCEDURE — 99213 OFFICE O/P EST LOW 20 MIN: CPT | Mod: PBBFAC,PO | Performed by: INTERNAL MEDICINE

## 2020-01-16 PROCEDURE — 99999 PR PBB SHADOW E&M-EST. PATIENT-LVL III: ICD-10-PCS | Mod: PBBFAC,,, | Performed by: INTERNAL MEDICINE

## 2020-01-16 PROCEDURE — 99214 PR OFFICE/OUTPT VISIT, EST, LEVL IV, 30-39 MIN: ICD-10-PCS | Mod: S$PBB,,, | Performed by: INTERNAL MEDICINE

## 2020-01-16 NOTE — PROGRESS NOTES
CHIEF COMPLAINT: THYROID NODULE  79 year old being seen as a f/u. Had FNA 3/15 on the left that was benign. She is monitoring liquid intake- approx 1 liter. No N/V. No supplements. No biotin. Overall feeling well. Off thiazide. Scheduled to start IV iron for anemia.           PAST MEDICAL HISTORY/PAST SURGICAL HISTORY:  Reviewed in [facility]    SOCIAL HISTORY: No Tobacco. 1 glass of wine a day    FAMILY HISTORY:  No known thyroid disease or thyroid cancer. + DM. + Heart disease    MEDICATIONS/ALLERGIES: The patient's MedCard has been updated and reviewed.      ROS:   Constitutional: weight decreased. States has decreased appetite.   Eyes: No recent visual changes  ENT: No dysphagia  Cardiovascular: Denies current anginal symptoms  Respiratory: Being treated for pneumonia. Scheduled for CT today. Has some SOB.   Gastrointestinal:was having nausea but now resolved.   GenitoUrinary - No dysuria  Skin: No new skin rash  Neurologic: No focal neurologic complaints  Remainder ROS negative        PE:    GENERAL: Well developed, well nourished.  ENT: Nares patent, oropharynx clear, mucosa pink,   NECK: Supple, trachea midline, no palpable nodules  CHEST: Resp even and unlabored, CTA bilateral.  CARDIAC: RRR, S1, S2 heard, no murmurs, rubs, S3, or S4    Results for GALINDO NGO (MRN [id number]) as of 1/16/2020 10:03   Ref. Range 1/9/2020 10:30   Sodium Latest Ref Range: 136 - 145 mmol/L 134 (L)   Potassium Latest Ref Range: 3.5 - 5.1 mmol/L 3.7   Chloride Latest Ref Range: 95 - 110 mmol/L 99   CO2 Latest Ref Range: 23 - 29 mmol/L 25   Anion Gap Latest Ref Range: 8 - 16 mmol/L 10   BUN, Bld Latest Ref Range: 8 - 23 mg/dL 23   Creatinine Latest Ref Range: 0.5 - 1.4 mg/dL 0.8   eGFR if non African American Latest Ref Range: >60 mL/min/1.73 m^2 >60.0   eGFR if African American Latest Ref Range: >60 mL/min/1.73 m^2 >60.0   Glucose Latest Ref Range: 70 - 110 mg/dL 146 (H)   Calcium Latest Ref Range: 8.7 - 10.5 mg/dL 10.2   Alkaline  Phosphatase Latest Ref Range: 55 - 135 U/L 284 (H)   PROTEIN TOTAL Latest Ref Range: 6.0 - 8.4 g/dL 6.7   Albumin Latest Ref Range: 3.5 - 5.2 g/dL 2.4 (L)   BILIRUBIN TOTAL Latest Ref Range: 0.1 - 1.0 mg/dL 0.4   AST Latest Ref Range: 10 - 40 U/L 29   ALT Latest Ref Range: 10 - 44 U/L 81 (H)   LD Latest Ref Range: 110 - 260 U/L 160   TSH Latest Ref Range: 0.400 - 4.000 uIU/mL 0.976   TTG IgA Latest Ref Range: <20 UNITS 2   IgA Latest Ref Range: 40 - 350 mg/dL 30 (L)         ASSESSMENT/PLAN:  1. Thyroid Nodules- She has had a benign FNA. No obstructive symptoms. Check US at f/u.     2. Hyponatremia- Na at low end of normal but acceptable.     3. Elevated TSH- TSH WNL    4. HTN- BP controlled. Continue current Tx.     FOLLOWUP  F/U 6 months with TSH, CMP, thyroid US

## 2020-01-20 ENCOUNTER — HOSPITAL ENCOUNTER (OUTPATIENT)
Dept: RADIOLOGY | Facility: HOSPITAL | Age: 80
Discharge: HOME | End: 2020-01-20
Attending: INTERNAL MEDICINE
Payer: MEDICARE

## 2020-01-20 DIAGNOSIS — K12.30 MUCOSITIS: Primary | ICD-10-CM

## 2020-01-20 PROCEDURE — 25500020 PHARM REV CODE 255: Mod: PO | Performed by: INTERNAL MEDICINE

## 2020-01-20 PROCEDURE — 71260 CT CHEST WITH CONTRAST: ICD-10-PCS | Mod: 26,,, | Performed by: RADIOLOGY

## 2020-01-20 PROCEDURE — 71260 CT THORAX DX C+: CPT | Mod: 26,,, | Performed by: RADIOLOGY

## 2020-01-20 PROCEDURE — 71260 CT THORAX DX C+: CPT | Mod: TC,PO

## 2020-01-20 RX ADMIN — IOHEXOL 75 ML: 350 INJECTION, SOLUTION INTRAVENOUS at 02:01

## 2020-01-24 ENCOUNTER — INFUSION (OUTPATIENT)
Dept: INFUSION THERAPY | Facility: HOSPITAL | Age: 80
End: 2020-01-24
Attending: INTERNAL MEDICINE
Payer: MEDICARE

## 2020-01-24 VITALS
TEMPERATURE: 98 F | DIASTOLIC BLOOD PRESSURE: 73 MMHG | HEART RATE: 91 BPM | WEIGHT: 90.38 LBS | RESPIRATION RATE: 18 BRPM | SYSTOLIC BLOOD PRESSURE: 117 MMHG | HEIGHT: 60 IN | BODY MASS INDEX: 17.75 KG/M2

## 2020-01-24 DIAGNOSIS — D50.8 OTHER IRON DEFICIENCY ANEMIA: Primary | ICD-10-CM

## 2020-01-24 PROCEDURE — 96365 THER/PROPH/DIAG IV INF INIT: CPT | Mod: PN

## 2020-01-24 PROCEDURE — 25000003 PHARM REV CODE 250: Mod: PN | Performed by: INTERNAL MEDICINE

## 2020-01-24 PROCEDURE — 63600175 PHARM REV CODE 636 W HCPCS: Mod: PN | Performed by: INTERNAL MEDICINE

## 2020-01-24 PROCEDURE — A4216 STERILE WATER/SALINE, 10 ML: HCPCS | Mod: PN | Performed by: INTERNAL MEDICINE

## 2020-01-24 PROCEDURE — S0028 INJECTION, FAMOTIDINE, 20 MG: HCPCS | Mod: PN | Performed by: INTERNAL MEDICINE

## 2020-01-24 PROCEDURE — 96375 TX/PRO/DX INJ NEW DRUG ADDON: CPT | Mod: PN

## 2020-01-24 RX ORDER — SODIUM CHLORIDE 0.9 % (FLUSH) 0.9 %
10 SYRINGE (ML) INJECTION
Status: DISCONTINUED | OUTPATIENT
Start: 2020-01-24 | End: 2020-01-24 | Stop reason: HOSPADM

## 2020-01-24 RX ORDER — FAMOTIDINE 10 MG/ML
20 INJECTION INTRAVENOUS DAILY
Status: DISCONTINUED | OUTPATIENT
Start: 2020-01-24 | End: 2020-01-24 | Stop reason: HOSPADM

## 2020-01-24 RX ORDER — DIPHENHYDRAMINE HYDROCHLORIDE 50 MG/ML
25 INJECTION INTRAMUSCULAR; INTRAVENOUS
Status: COMPLETED | OUTPATIENT
Start: 2020-01-24 | End: 2020-01-24

## 2020-01-24 RX ORDER — FAMOTIDINE 10 MG/ML
20 INJECTION INTRAVENOUS DAILY
Status: CANCELLED
Start: 2020-02-02

## 2020-01-24 RX ORDER — DEXAMETHASONE SODIUM PHOSPHATE 4 MG/ML
4 INJECTION, SOLUTION INTRA-ARTICULAR; INTRALESIONAL; INTRAMUSCULAR; INTRAVENOUS; SOFT TISSUE
Status: CANCELLED
Start: 2020-02-02

## 2020-01-24 RX ORDER — DIPHENHYDRAMINE HYDROCHLORIDE 50 MG/ML
25 INJECTION INTRAMUSCULAR; INTRAVENOUS
Status: CANCELLED
Start: 2020-02-02

## 2020-01-24 RX ORDER — DEXAMETHASONE SODIUM PHOSPHATE 4 MG/ML
4 INJECTION, SOLUTION INTRA-ARTICULAR; INTRALESIONAL; INTRAMUSCULAR; INTRAVENOUS; SOFT TISSUE
Status: COMPLETED | OUTPATIENT
Start: 2020-01-24 | End: 2020-01-24

## 2020-01-24 RX ORDER — HEPARIN 100 UNIT/ML
500 SYRINGE INTRAVENOUS
Status: CANCELLED | OUTPATIENT
Start: 2020-02-02

## 2020-01-24 RX ORDER — SODIUM CHLORIDE 0.9 % (FLUSH) 0.9 %
10 SYRINGE (ML) INJECTION
Status: CANCELLED | OUTPATIENT
Start: 2020-02-02

## 2020-01-24 RX ADMIN — FERUMOXYTOL 510 MG: 510 INJECTION INTRAVENOUS at 02:01

## 2020-01-24 RX ADMIN — DIPHENHYDRAMINE HYDROCHLORIDE 25 MG: 50 INJECTION INTRAMUSCULAR; INTRAVENOUS at 02:01

## 2020-01-24 RX ADMIN — DEXAMETHASONE SODIUM PHOSPHATE 4 MG: 4 INJECTION INTRA-ARTICULAR; INTRALESIONAL; INTRAMUSCULAR; INTRAVENOUS; SOFT TISSUE at 02:01

## 2020-01-24 RX ADMIN — FAMOTIDINE 20 MG: 10 INJECTION, SOLUTION INTRAVENOUS at 02:01

## 2020-01-24 RX ADMIN — SODIUM CHLORIDE: 9 INJECTION, SOLUTION INTRAVENOUS at 02:01

## 2020-01-24 RX ADMIN — Medication 10 ML: at 02:01

## 2020-01-24 NOTE — PLAN OF CARE
Tolerated Feraheme well, 30 minute post infusion VSS, side effects and schedule reviewed with pt, [place]asper hand out provided and reviewed, ambulated from infusion center, no distress noted

## 2020-01-27 ENCOUNTER — INFUSION (OUTPATIENT)
Dept: INFUSION THERAPY | Facility: HOSPITAL | Age: 80
End: 2020-01-27
Attending: INTERNAL MEDICINE
Payer: MEDICARE

## 2020-01-27 VITALS
SYSTOLIC BLOOD PRESSURE: 108 MMHG | HEART RATE: 98 BPM | HEIGHT: 60 IN | RESPIRATION RATE: 18 BRPM | DIASTOLIC BLOOD PRESSURE: 53 MMHG | BODY MASS INDEX: 17.83 KG/M2 | WEIGHT: 90.81 LBS | OXYGEN SATURATION: 98 % | TEMPERATURE: 98 F

## 2020-01-27 DIAGNOSIS — D50.8 OTHER IRON DEFICIENCY ANEMIA: Primary | ICD-10-CM

## 2020-01-27 PROCEDURE — 25000003 PHARM REV CODE 250: Mod: PN | Performed by: INTERNAL MEDICINE

## 2020-01-27 PROCEDURE — 96367 TX/PROPH/DG ADDL SEQ IV INF: CPT | Mod: PN

## 2020-01-27 PROCEDURE — S0028 INJECTION, FAMOTIDINE, 20 MG: HCPCS | Mod: PN | Performed by: INTERNAL MEDICINE

## 2020-01-27 PROCEDURE — A4216 STERILE WATER/SALINE, 10 ML: HCPCS | Mod: PN | Performed by: INTERNAL MEDICINE

## 2020-01-27 PROCEDURE — 63600175 PHARM REV CODE 636 W HCPCS: Mod: PN | Performed by: INTERNAL MEDICINE

## 2020-01-27 PROCEDURE — 96375 TX/PRO/DX INJ NEW DRUG ADDON: CPT | Mod: PN

## 2020-01-27 RX ORDER — FAMOTIDINE 10 MG/ML
20 INJECTION INTRAVENOUS DAILY
Status: CANCELLED
Start: 2020-02-02

## 2020-01-27 RX ORDER — DEXAMETHASONE SODIUM PHOSPHATE 4 MG/ML
4 INJECTION, SOLUTION INTRA-ARTICULAR; INTRALESIONAL; INTRAMUSCULAR; INTRAVENOUS; SOFT TISSUE
Status: CANCELLED
Start: 2020-02-02

## 2020-01-27 RX ORDER — DEXAMETHASONE SODIUM PHOSPHATE 4 MG/ML
4 INJECTION, SOLUTION INTRA-ARTICULAR; INTRALESIONAL; INTRAMUSCULAR; INTRAVENOUS; SOFT TISSUE
Status: COMPLETED | OUTPATIENT
Start: 2020-01-27 | End: 2020-01-27

## 2020-01-27 RX ORDER — DIPHENHYDRAMINE HYDROCHLORIDE 50 MG/ML
25 INJECTION INTRAMUSCULAR; INTRAVENOUS
Status: CANCELLED
Start: 2020-02-02

## 2020-01-27 RX ORDER — DIPHENHYDRAMINE HYDROCHLORIDE 50 MG/ML
25 INJECTION INTRAMUSCULAR; INTRAVENOUS
Status: COMPLETED | OUTPATIENT
Start: 2020-01-27 | End: 2020-01-27

## 2020-01-27 RX ORDER — SODIUM CHLORIDE 0.9 % (FLUSH) 0.9 %
10 SYRINGE (ML) INJECTION
Status: CANCELLED | OUTPATIENT
Start: 2020-02-02

## 2020-01-27 RX ORDER — SODIUM CHLORIDE 0.9 % (FLUSH) 0.9 %
10 SYRINGE (ML) INJECTION
Status: DISCONTINUED | OUTPATIENT
Start: 2020-01-27 | End: 2020-01-27 | Stop reason: HOSPADM

## 2020-01-27 RX ORDER — HEPARIN 100 UNIT/ML
500 SYRINGE INTRAVENOUS
Status: CANCELLED | OUTPATIENT
Start: 2020-02-02

## 2020-01-27 RX ORDER — FAMOTIDINE 10 MG/ML
20 INJECTION INTRAVENOUS ONCE
Status: COMPLETED | OUTPATIENT
Start: 2020-01-27 | End: 2020-01-27

## 2020-01-27 RX ADMIN — FAMOTIDINE 20 MG: 10 INJECTION, SOLUTION INTRAVENOUS at 02:01

## 2020-01-27 RX ADMIN — Medication 10 ML: at 02:01

## 2020-01-27 RX ADMIN — FERUMOXYTOL 510 MG: 510 INJECTION INTRAVENOUS at 02:01

## 2020-01-27 RX ADMIN — DEXAMETHASONE SODIUM PHOSPHATE 4 MG: 4 INJECTION INTRA-ARTICULAR; INTRALESIONAL; INTRAMUSCULAR; INTRAVENOUS; SOFT TISSUE at 02:01

## 2020-01-27 RX ADMIN — DIPHENHYDRAMINE HYDROCHLORIDE: 50 INJECTION INTRAMUSCULAR; INTRAVENOUS at 02:01

## 2020-01-27 RX ADMIN — SODIUM CHLORIDE: 9 INJECTION, SOLUTION INTRAVENOUS at 02:01

## 2020-01-27 NOTE — PLAN OF CARE
Trice presented to infusion suite for treatment.  Assessment completed and POC discussed  Verbally acknowledged understanding

## 2020-01-27 NOTE — PLAN OF CARE
Tolerated infusion without difficulty  copy of avs and scheduled given  Pt ambulated to private vehicle without difficulty  NAD

## 2020-02-17 ENCOUNTER — OFFICE VISIT (OUTPATIENT)
Dept: HEMATOLOGY/ONCOLOGY | Facility: CLINIC | Age: 80
End: 2020-02-17
Payer: MEDICARE

## 2020-02-17 VITALS
TEMPERATURE: 98 F | DIASTOLIC BLOOD PRESSURE: 78 MMHG | RESPIRATION RATE: 20 BRPM | SYSTOLIC BLOOD PRESSURE: 128 MMHG | HEART RATE: 97 BPM | WEIGHT: 90.19 LBS | BODY MASS INDEX: 17.71 KG/M2 | HEIGHT: 60 IN

## 2020-02-17 DIAGNOSIS — C85.80 MARGINAL ZONE B-CELL LYMPHOMA: Primary | ICD-10-CM

## 2020-02-17 DIAGNOSIS — K12.1 MOUTH ULCERS: ICD-10-CM

## 2020-02-17 DIAGNOSIS — R63.4 UNEXPLAINED WEIGHT LOSS: ICD-10-CM

## 2020-02-17 DIAGNOSIS — J18.9 PNEUMONIA, UNSPECIFIED ORGANISM: ICD-10-CM

## 2020-02-17 DIAGNOSIS — R61 NIGHT SWEATS: ICD-10-CM

## 2020-02-17 DIAGNOSIS — D50.8 OTHER IRON DEFICIENCY ANEMIA: ICD-10-CM

## 2020-02-17 PROBLEM — D50.9 IRON DEFICIENCY ANEMIA: Status: ACTIVE | Noted: 2020-02-17

## 2020-02-17 PROCEDURE — 99214 OFFICE O/P EST MOD 30 MIN: CPT | Mod: PBBFAC,PN | Performed by: INTERNAL MEDICINE

## 2020-02-17 PROCEDURE — 99999 PR PBB SHADOW E&M-EST. PATIENT-LVL IV: CPT | Mod: PBBFAC,,, | Performed by: INTERNAL MEDICINE

## 2020-02-17 PROCEDURE — 99214 OFFICE O/P EST MOD 30 MIN: CPT | Mod: S$PBB,,, | Performed by: INTERNAL MEDICINE

## 2020-02-17 PROCEDURE — 99214 PR OFFICE/OUTPT VISIT, EST, LEVL IV, 30-39 MIN: ICD-10-PCS | Mod: S$PBB,,, | Performed by: INTERNAL MEDICINE

## 2020-02-17 PROCEDURE — 99999 PR PBB SHADOW E&M-EST. PATIENT-LVL IV: ICD-10-PCS | Mod: PBBFAC,,, | Performed by: INTERNAL MEDICINE

## 2020-02-17 NOTE — PROGRESS NOTES
History of present illness:  The patient is an 79-year-old white female who relocated here from [place].  She was recently seen by Jacob De La O in order to establish care in our clinic.  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  She was diagnosed in 2009, treated with Treanda/Rituxan, attained remission, and completed 2 years of maintenance therapy with Rituxan.  She has remained without evidence of disease recurrence.  At the time of her clinic appointment she had complaints of a 10 lb weight loss over the summer which she could not explain.  She was also experiencing sweats.  She denies lymphadenopathy and pruritus.  She has not experience fever.  She denies any signs or symptoms of GI bleeding. Interval CT PET was negative.  Bone marrow aspiration and biopsy were negative.  Patient was subsequently sent for upper and lower endoscopy.  She returns to clinic to discuss results.  Since endoscopy, the patient has had unrelenting cough, dyspnea exacerbated by exertion, and generalized weakness.  Patient is also experiencing ulceration on the inside of her lower lip which she finds painful in unresponsive to over-the-counter remedies.      Since last office evaluation, patient has completed intravenous iron replacement therapy and returns to review post treatment CBC to document response.  She has also undergone antibiotic treatment for management of previously documented pneumonia.  CT scans are also available for review.  Patient was noted to have a depressed serum IgA level on laboratory obtained by another provider.  They suggested she be screened for hypogammaglobulinemia.  Clinically, patient is feeling much better.  Cough and dyspnea have diminished.  Stamina has improved.  Skin tone is better.  No other complaints for pertinent findings on a 14 point review of systems.    Physical examination:  The patient is a well-developed, thin  appearing, elderly, white female in no acute distress, who has a weight of 90 lb (increased by 0.5 lb).  VITAL SIGNS: Documented  and reviewed this visit.  HEENT: Normocephalic, atraumatic. Oral mucosa pink and moist. Lips without   lesions. Tongue midline.  Inner aspect of patient's lower lobe has ulcerated lesions without superimposed thrush or hemorrhage. Nonicteric sclerae.   NECK: Supple, no adenopathy. No carotid bruits, thyromegaly or thyroid nodule.   HEART: Regular rate and rhythm without murmur, gallop or rub.   LUNGS: Clear to auscultation bilaterally. Normal respiratory effort.   ABDOMEN: Soft, nontender, nondistended with positive normoactive bowel sounds,   no hepatosplenomegaly.   EXTREMITIES: No cyanosis, clubbing or edema. Distal pulses are intact.   AXILLAE AND GROIN: No palpable pathologic lymphadenopathy is appreciated.   SKIN: Intact/turgor normal   NEUROLOGIC: Cranial nerves II-XII grossly intact. Motor: Good muscle bulk and   tone. Strength/sensory 5/5 throughout. Gait stable.     Laboratory:  White count 11.4, hemoglobin 10.7, hematocrit 34.3, platelets 370, absolute neutrophil count is 9100.    CT chest:  Scattered consolidative processes more so within the lung bases with a or increased compared to the prior study 01/07/2020.  Findings most likely represent pneumonia but recommend correlation clinically and follow-up to be sure that there is complete clearing.     Impression:  1.  History of marginal zone B-cell non-Hodgkin's lymphoma.  2.  Iron deficiency anemia with suboptimal response to intravenous iron replacement.  3.  Unexplained weight loss-stable.  4.  Night sweats.  5.  Abdominal pain in association with nausea emesis-improved on PPI.   6.  Mouth ulcers  7.  Pneumonia.    Plan:  1.  Re-evaluate 3 months from now with interval CBC, CMP, LDH, beta 2 microglobulin, quantitative immunoglobulins, and repeat CT of the chest with contrast.    This note was created using voice recognition  software and may contain grammatical errors.

## 2020-04-24 ENCOUNTER — TELEPHONE (OUTPATIENT)
Dept: GASTROENTEROLOGY | Facility: CLINIC | Age: 80
End: 2020-04-24

## 2020-04-24 NOTE — TELEPHONE ENCOUNTER
----- Message from Zander Casper MD sent at 4/24/2020 10:45 AM CDT -----  Please schedule follow up with me. Can be virtual visit.

## 2020-05-06 ENCOUNTER — HOSPITAL ENCOUNTER (OUTPATIENT)
Dept: RADIOLOGY | Facility: HOSPITAL | Age: 80
Discharge: HOME | End: 2020-05-06
Attending: INTERNAL MEDICINE
Payer: MEDICARE

## 2020-05-06 ENCOUNTER — PATIENT MESSAGE (OUTPATIENT)
Dept: HEMATOLOGY/ONCOLOGY | Facility: CLINIC | Age: 80
End: 2020-05-06

## 2020-05-06 DIAGNOSIS — D50.8 OTHER IRON DEFICIENCY ANEMIA: ICD-10-CM

## 2020-05-06 DIAGNOSIS — J18.9 PNEUMONIA, UNSPECIFIED ORGANISM: ICD-10-CM

## 2020-05-06 DIAGNOSIS — K12.1 MOUTH ULCERS: ICD-10-CM

## 2020-05-06 DIAGNOSIS — C85.80 MARGINAL ZONE B-CELL LYMPHOMA: ICD-10-CM

## 2020-05-06 DIAGNOSIS — R63.4 UNEXPLAINED WEIGHT LOSS: ICD-10-CM

## 2020-05-06 DIAGNOSIS — R61 NIGHT SWEATS: ICD-10-CM

## 2020-05-06 PROCEDURE — 25500020 PHARM REV CODE 255: Mod: PO | Performed by: INTERNAL MEDICINE

## 2020-05-06 PROCEDURE — 71260 CT THORAX DX C+: CPT | Mod: 26,,, | Performed by: RADIOLOGY

## 2020-05-06 PROCEDURE — 71260 CT CHEST WITH CONTRAST: ICD-10-PCS | Mod: 26,,, | Performed by: RADIOLOGY

## 2020-05-06 PROCEDURE — 71260 CT THORAX DX C+: CPT | Mod: TC,PO

## 2020-05-06 RX ADMIN — IOHEXOL 75 ML: 350 INJECTION, SOLUTION INTRAVENOUS at 09:05

## 2020-05-15 ENCOUNTER — OFFICE VISIT (OUTPATIENT)
Dept: HEMATOLOGY/ONCOLOGY | Facility: CLINIC | Age: 80
End: 2020-05-15
Payer: MEDICARE

## 2020-05-15 VITALS
RESPIRATION RATE: 16 BRPM | WEIGHT: 93.94 LBS | OXYGEN SATURATION: 97 % | BODY MASS INDEX: 18.44 KG/M2 | DIASTOLIC BLOOD PRESSURE: 67 MMHG | HEIGHT: 60 IN | SYSTOLIC BLOOD PRESSURE: 124 MMHG | TEMPERATURE: 98 F | HEART RATE: 87 BPM

## 2020-05-15 DIAGNOSIS — R63.4 UNEXPLAINED WEIGHT LOSS: ICD-10-CM

## 2020-05-15 DIAGNOSIS — J18.9 PNEUMONIA, UNSPECIFIED ORGANISM: ICD-10-CM

## 2020-05-15 DIAGNOSIS — D50.8 OTHER IRON DEFICIENCY ANEMIA: ICD-10-CM

## 2020-05-15 DIAGNOSIS — C85.80 MARGINAL ZONE B-CELL LYMPHOMA: Primary | ICD-10-CM

## 2020-05-15 DIAGNOSIS — D80.1 ACQUIRED HYPOGAMMAGLOBULINEMIA: ICD-10-CM

## 2020-05-15 PROCEDURE — 99214 PR OFFICE/OUTPT VISIT, EST, LEVL IV, 30-39 MIN: ICD-10-PCS | Mod: S$PBB,,, | Performed by: INTERNAL MEDICINE

## 2020-05-15 PROCEDURE — 99214 OFFICE O/P EST MOD 30 MIN: CPT | Mod: S$PBB,,, | Performed by: INTERNAL MEDICINE

## 2020-05-15 PROCEDURE — 99999 PR PBB SHADOW E&M-EST. PATIENT-LVL V: CPT | Mod: PBBFAC,,, | Performed by: INTERNAL MEDICINE

## 2020-05-15 PROCEDURE — 99215 OFFICE O/P EST HI 40 MIN: CPT | Mod: PBBFAC,PN | Performed by: INTERNAL MEDICINE

## 2020-05-15 PROCEDURE — 99999 PR PBB SHADOW E&M-EST. PATIENT-LVL V: ICD-10-PCS | Mod: PBBFAC,,, | Performed by: INTERNAL MEDICINE

## 2020-05-15 NOTE — PROGRESS NOTES
History of present illness:  The patient is an 79-year-old white female who relocated here from [place].  She was recently seen by Jacob De La O in order to establish care in our clinic.  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  She was diagnosed in 2009, treated with Treanda/Rituxan, attained remission, and completed 2 years of maintenance therapy with Rituxan.  She has remained without evidence of disease recurrence.  At the time of her clinic appointment she had complaints of a 10 lb weight loss over the summer which she could not explain.  She was also experiencing sweats.  She denies lymphadenopathy and pruritus.  She has not experience fever.  She denies any signs or symptoms of GI bleeding. Interval CT PET was negative.  Bone marrow aspiration and biopsy were negative.  Patient was subsequently sent for upper and lower endoscopy.  She returns to clinic to discuss results.  Since endoscopy, the patient has had unrelenting cough, dyspnea exacerbated by exertion, and generalized weakness.  Patient is also experiencing ulceration on the inside of her lower lip which she finds painful in unresponsive to over-the-counter remedies.      Patient has also received intravenous iron replacement for management of iron deficiency anemia.  She also has been noted to have hypogammaglobulinemia in returns to clinic today to review surveillance lab and imaging.  Clinically, the patient is without signs or symptoms of worsening anemia.  She has had no recurrent infections since last office evaluation.  She reports having gained 2 lb.  She remains with hoarseness and nonproductive cough.  No other complaints pertinent findings on a 14 point review systems.      Physical examination:  The patient is a well-developed, thin appearing, elderly, white female in no acute distress, who has a weight of 93.5 lb (increased by 3.5 lb).  VITAL SIGNS: Documented  and  reviewed this visit.  HEENT: Normocephalic, atraumatic. Oral mucosa pink and moist. Lips without   lesions. Tongue midline.  Inner aspect of patient's lower lobe has ulcerated lesions without superimposed thrush or hemorrhage. Nonicteric sclerae.   NECK: Supple, no adenopathy. No carotid bruits, thyromegaly or thyroid nodule.   HEART: Regular rate and rhythm without murmur, gallop or rub.   LUNGS: Clear to auscultation bilaterally. Normal respiratory effort.   ABDOMEN: Soft, nontender, nondistended with positive normoactive bowel sounds,   no hepatosplenomegaly.   EXTREMITIES: No cyanosis, clubbing or edema. Distal pulses are intact.   AXILLAE AND GROIN: No palpable pathologic lymphadenopathy is appreciated.   SKIN: Intact/turgor normal   NEUROLOGIC: Cranial nerves II-XII grossly intact. Motor: Good muscle bulk and   tone. Strength/sensory 5/5 throughout. Gait stable.     Laboratory:  White count 6.9, hemoglobin 15, hematocrit 45.8, platelets 112, absolute neutrophil count is 4900.  Sodium 140, potassium 4.3, chloride 103, CO2 23, BUN 20, creatinine 0.8, glucose 97, calcium 10.2, liver function test within normal limits, LDH is 153, GFR is greater than 60.  Beta 2 microglobulin 2.7.  Quantitative immunoglobulins revealed depressed IgG of 239, elevated IgM 695, and normal IgA of 45.    CT chest:  Study performed 05/06/2020.  1. Significant improvement in consolidation is noted diffusely since 01/20/2020 suggestive of a treated or resolving process.  Residual consolidation and endobronchial density is still present of uncertain etiology.  Please correlate for processes such as atypical infections, a larger bronchopulmonary aspergillosis, and aspiration.  Given that this is nonspecific clinical correlation and follow-up for resolution is suggested.  2. Thyroid nodules, thyroid ultrasound has been performed in the past  3. 6 mm pulmonary nodule stable since the prior.  Follow-up in 1 year for size stability in 1 year  would be suggested.     Impression:  1.  History of marginal zone B-cell non-Hodgkin's lymphoma-remains no evidence of disease..  2.  Iron deficiency anemia resolved following intravenous iron replacement.  3.  Unexplained weight loss-improving.  4.  History of Pneumonia-improved on most recent imaging.  5.  Hypogammaglobulinemia without signs of recurrent infections.    Plan:  1.  Continue observation and have patient return to clinic 6 months from now with interval CBC, CMP, LDH, beta 2 microglobulin, and repeat noncontrast CT of the chest for follow-up of pulmonary nodules.    This note was created using voice recognition software and may contain grammatical errors.

## 2020-05-26 DIAGNOSIS — R10.13 DYSPEPSIA: ICD-10-CM

## 2020-05-26 RX ORDER — PROCHLORPERAZINE MALEATE 10 MG
10 TABLET ORAL EVERY 6 HOURS PRN
Qty: 60 TABLET | Refills: 3 | Status: SHIPPED | OUTPATIENT
Start: 2020-05-26 | End: 2020-10-17

## 2020-06-05 ENCOUNTER — OFFICE VISIT (OUTPATIENT)
Dept: GASTROENTEROLOGY | Facility: CLINIC | Age: 80
End: 2020-06-05
Payer: MEDICARE

## 2020-06-05 VITALS
WEIGHT: 95.88 LBS | DIASTOLIC BLOOD PRESSURE: 67 MMHG | BODY MASS INDEX: 19.33 KG/M2 | SYSTOLIC BLOOD PRESSURE: 98 MMHG | HEIGHT: 59 IN | HEART RATE: 100 BPM

## 2020-06-05 DIAGNOSIS — K52.9 CHRONIC DIARRHEA: Primary | ICD-10-CM

## 2020-06-05 DIAGNOSIS — K56.699 COLONIC STRICTURE: ICD-10-CM

## 2020-06-05 PROCEDURE — 99999 PR PBB SHADOW E&M-EST. PATIENT-LVL III: CPT | Mod: PBBFAC,,, | Performed by: INTERNAL MEDICINE

## 2020-06-05 PROCEDURE — 99214 PR OFFICE/OUTPT VISIT, EST, LEVL IV, 30-39 MIN: ICD-10-PCS | Mod: S$PBB,,, | Performed by: INTERNAL MEDICINE

## 2020-06-05 PROCEDURE — 99213 OFFICE O/P EST LOW 20 MIN: CPT | Mod: PBBFAC,PO | Performed by: INTERNAL MEDICINE

## 2020-06-05 PROCEDURE — 99214 OFFICE O/P EST MOD 30 MIN: CPT | Mod: S$PBB,,, | Performed by: INTERNAL MEDICINE

## 2020-06-05 PROCEDURE — 99999 PR PBB SHADOW E&M-EST. PATIENT-LVL III: ICD-10-PCS | Mod: PBBFAC,,, | Performed by: INTERNAL MEDICINE

## 2020-06-05 NOTE — PROGRESS NOTES
"CC: anemia    HPI 80 y.o. female with history of B-cell lymphoma here for follow up of weight loss and chronic, persistent asymptomatic iron deficiency anemia without associated GI related blood loss. She had EGD and Colonoscopy in January 2020. She had a non-obstructing Schatzki ring, small hiatal hernia, gastritis, several gastric polyps and a duodenal diverticulum. Colonoscopy showed a colonic stricture that was biopsied and site was tattooed for future localization. She was referred to surgery who recommended barium enema which showed a redundant and tortuous colon without obstructing or constricting lesions or polyps. She does endorse abdominal fullness, poor appetite. She has gained some weight but denies normal formed stool. She has been having loose, liquid stools. No blood in stool. Denies fevers, chills. No nausea or vomiting but does report poor appetite. She received iron infusion and is feeling much better in regards to anemia.                    Medical records reviewed. Additional history supplemented by nursing.     Past Medical History:   Diagnosis Date    Anemia     Cancer 2009    non-hodkins lymphoma-remission 5 yrs in 4/14    Eye hemorrhage     12/2015    Osteoporosis     Sleep apnea     uses dental mouthpiece, no cpap/bipap     Review of Systems  General ROS: negative for chills, fever or weight loss  Cardiovascular ROS: no chest pain or dyspnea on exertion  Gastrointestinal ROS: +abdominal bloating, +diarrhea, no abdominal pain, change in bowel habits, or black/ bloody stools    Physical Examination  Ht 4' 11" (1.499 m)   Wt 43.5 kg (95 lb 14.4 oz)   BMI 19.37 kg/m²   General appearance: alert, cooperative, no distress  HENT: Normocephalic, atraumatic, neck symmetrical, no nasal discharge   Lungs: clear to auscultation bilaterally, symmetric chest wall expansion bilaterally  Heart: regular rate and rhythm without rub; no displacement of the PMI   Abdomen: soft, non-tender; bowel sounds " normoactive; no organomegaly  Extremities: extremities symmetric; no clubbing, cyanosis, or edema  Neurologic: Alert and oriented X 3, normal strength, normal coordination and gait    Labs:  Lab Results   Component Value Date    WBC 6.91 05/06/2020    HGB 15.0 05/06/2020    HCT 45.8 05/06/2020    MCV 98 05/06/2020     (L) 05/06/2020     Lab Results   Component Value Date    ALT 18 05/06/2020    AST 20 05/06/2020    ALKPHOS 51 (L) 05/06/2020    BILITOT 0.3 05/06/2020     Imaging:  Barium Enema:  Very redundant and tortuous colon.  No obstructing or constricting lesions or polyps are identified.  Three tiny and 1 small diverticulum is noted in the sigmoid colon.    Independently reviewed    Assessment:   80 y.o. female with history of B-cell lymphoma here for follow up of weight loss and chronic, persistent asymptomatic iron deficiency anemia without associated GI related blood loss. She did have findings of a colonic stricture, but it appears benign and unclear what could potentially be causing SPENCER proximal to stricture. Will plan re-evaluation with gastroscope and biopsies for microscopic colitis.    1. Colonic stricture  2. Iron deficiency anemia  3. Weight loss  4. Early satiety  5. Poor appetite    Plan:  -Check Ttg-IgA and total IgA  -Repeat colonoscopy with biopsy for microscopic colitis and use gastroscope to evaluate colonic mucosa proximal to colonic stricture-if unable to traverse she likely needs dilation of stricture or surgical resection  -Check CBC, CMP to evaluate anemia  -Check CRP and fecal calprotectin to evaluate for underlying inflammation    Zander Casper MD  [facility]  [address] [place] [address]  Office: [phone]  Fax: [phone]

## 2020-06-08 ENCOUNTER — LAB VISIT (OUTPATIENT)
Dept: LAB | Facility: HOSPITAL | Age: 80
End: 2020-06-08
Attending: INTERNAL MEDICINE
Payer: MEDICARE

## 2020-06-08 DIAGNOSIS — K56.699 COLONIC STRICTURE: ICD-10-CM

## 2020-06-08 DIAGNOSIS — K52.9 CHRONIC DIARRHEA: ICD-10-CM

## 2020-06-08 LAB
ALBUMIN SERPL BCP-MCNC: 3.9 G/DL (ref 3.5–5.2)
ALP SERPL-CCNC: 53 U/L (ref 55–135)
ALT SERPL W/O P-5'-P-CCNC: 17 U/L (ref 10–44)
ANION GAP SERPL CALC-SCNC: 8 MMOL/L (ref 8–16)
AST SERPL-CCNC: 17 U/L (ref 10–40)
BASOPHILS # BLD AUTO: 0.05 K/UL (ref 0–0.2)
BASOPHILS NFR BLD: 0.5 % (ref 0–1.9)
BILIRUB SERPL-MCNC: 0.4 MG/DL (ref 0.1–1)
BUN SERPL-MCNC: 20 MG/DL (ref 8–23)
CALCIUM SERPL-MCNC: 10 MG/DL (ref 8.7–10.5)
CHLORIDE SERPL-SCNC: 100 MMOL/L (ref 95–110)
CO2 SERPL-SCNC: 28 MMOL/L (ref 23–29)
CREAT SERPL-MCNC: 0.8 MG/DL (ref 0.5–1.4)
CRP SERPL-MCNC: 2.4 MG/L (ref 0–8.2)
DIFFERENTIAL METHOD: ABNORMAL
EOSINOPHIL # BLD AUTO: 0.1 K/UL (ref 0–0.5)
EOSINOPHIL NFR BLD: 0.6 % (ref 0–8)
ERYTHROCYTE [DISTWIDTH] IN BLOOD BY AUTOMATED COUNT: 12.6 % (ref 11.5–14.5)
EST. GFR  (AFRICAN AMERICAN): >60 ML/MIN/1.73 M^2
EST. GFR  (NON AFRICAN AMERICAN): >60 ML/MIN/1.73 M^2
GLUCOSE SERPL-MCNC: 86 MG/DL (ref 70–110)
HCT VFR BLD AUTO: 39.6 % (ref 37–48.5)
HGB BLD-MCNC: 12.4 G/DL (ref 12–16)
IGA SERPL-MCNC: 40 MG/DL (ref 40–350)
IMM GRANULOCYTES # BLD AUTO: 0.05 K/UL (ref 0–0.04)
IMM GRANULOCYTES NFR BLD AUTO: 0.5 % (ref 0–0.5)
LYMPHOCYTES # BLD AUTO: 1 K/UL (ref 1–4.8)
LYMPHOCYTES NFR BLD: 9.5 % (ref 18–48)
MCH RBC QN AUTO: 31.7 PG (ref 27–31)
MCHC RBC AUTO-ENTMCNC: 31.3 G/DL (ref 32–36)
MCV RBC AUTO: 101 FL (ref 82–98)
MONOCYTES # BLD AUTO: 0.8 K/UL (ref 0.3–1)
MONOCYTES NFR BLD: 7.2 % (ref 4–15)
NEUTROPHILS # BLD AUTO: 8.9 K/UL (ref 1.8–7.7)
NEUTROPHILS NFR BLD: 81.7 % (ref 38–73)
NRBC BLD-RTO: 0 /100 WBC
PLATELET # BLD AUTO: 373 K/UL (ref 150–350)
PMV BLD AUTO: 9.1 FL (ref 9.2–12.9)
POTASSIUM SERPL-SCNC: 4.1 MMOL/L (ref 3.5–5.1)
PROT SERPL-MCNC: 6.8 G/DL (ref 6–8.4)
RBC # BLD AUTO: 3.91 M/UL (ref 4–5.4)
SODIUM SERPL-SCNC: 136 MMOL/L (ref 136–145)
WBC # BLD AUTO: 10.82 K/UL (ref 3.9–12.7)

## 2020-06-08 PROCEDURE — 82784 ASSAY IGA/IGD/IGG/IGM EACH: CPT

## 2020-06-08 PROCEDURE — 85025 COMPLETE CBC W/AUTO DIFF WBC: CPT

## 2020-06-08 PROCEDURE — 83993 ASSAY FOR CALPROTECTIN FECAL: CPT

## 2020-06-08 PROCEDURE — 86140 C-REACTIVE PROTEIN: CPT

## 2020-06-08 PROCEDURE — 80053 COMPREHEN METABOLIC PANEL: CPT

## 2020-06-08 PROCEDURE — 83516 IMMUNOASSAY NONANTIBODY: CPT

## 2020-06-11 LAB — TTG IGA SER-ACNC: 2 UNITS

## 2020-06-16 LAB — CALPROTECTIN STL-MCNT: 155.1 MCG/G

## 2020-06-19 ENCOUNTER — TELEPHONE (OUTPATIENT)
Dept: GASTROENTEROLOGY | Facility: CLINIC | Age: 80
End: 2020-06-19

## 2020-06-19 NOTE — TELEPHONE ENCOUNTER
----- Message from Anita Fernandez sent at 6/19/2020  1:53 PM CDT -----  Contact: Patient [phone]  Type: Patient Call Back    Who called:Patient    What is the request in detail: Calling to find out the time of her Colonoscopy that's scheduled for 06-25-20. Please call.    Would the patient rather a call back or a response via [facility]? Call back    Best call back number:[phone]

## 2020-06-23 ENCOUNTER — LAB VISIT (OUTPATIENT)
Dept: FAMILY MEDICINE | Facility: CLINIC | Age: 80
End: 2020-06-23
Payer: MEDICARE

## 2020-06-23 ENCOUNTER — TELEPHONE (OUTPATIENT)
Dept: GASTROENTEROLOGY | Facility: CLINIC | Age: 80
End: 2020-06-23

## 2020-06-23 PROCEDURE — U0003 INFECTIOUS AGENT DETECTION BY NUCLEIC ACID (DNA OR RNA); SEVERE ACUTE RESPIRATORY SYNDROME CORONAVIRUS 2 (SARS-COV-2) (CORONAVIRUS DISEASE [COVID-19]), AMPLIFIED PROBE TECHNIQUE, MAKING USE OF HIGH THROUGHPUT TECHNOLOGIES AS DESCRIBED BY CMS-2020-01-R: HCPCS

## 2020-06-23 NOTE — TELEPHONE ENCOUNTER
Left message for patient to return phone call. Dr. Casper wanted her scheduled on a day that Dr. Moreno is scoping or scheduled with him due to a stricture she wants to look at it.

## 2020-06-24 LAB — SARS-COV-2 RNA RESP QL NAA+PROBE: NOT DETECTED

## 2020-06-26 ENCOUNTER — HOSPITAL ENCOUNTER (OUTPATIENT)
Facility: HOSPITAL | Age: 80
Discharge: HOME | End: 2020-06-26
Attending: INTERNAL MEDICINE | Admitting: INTERNAL MEDICINE
Payer: MEDICARE

## 2020-06-26 ENCOUNTER — ANESTHESIA EVENT (OUTPATIENT)
Dept: ENDOSCOPY | Facility: HOSPITAL | Age: 80
End: 2020-06-26
Payer: MEDICARE

## 2020-06-26 ENCOUNTER — ANESTHESIA (OUTPATIENT)
Dept: ENDOSCOPY | Facility: HOSPITAL | Age: 80
End: 2020-06-26
Payer: MEDICARE

## 2020-06-26 VITALS
WEIGHT: 93 LBS | TEMPERATURE: 98 F | HEIGHT: 59 IN | DIASTOLIC BLOOD PRESSURE: 65 MMHG | BODY MASS INDEX: 18.75 KG/M2 | OXYGEN SATURATION: 96 % | SYSTOLIC BLOOD PRESSURE: 140 MMHG | HEART RATE: 81 BPM | RESPIRATION RATE: 16 BRPM

## 2020-06-26 DIAGNOSIS — R19.4 CHANGE IN BOWEL HABITS: ICD-10-CM

## 2020-06-26 PROCEDURE — 88342 IMHCHEM/IMCYTCHM 1ST ANTB: CPT | Mod: 26,,, | Performed by: PATHOLOGY

## 2020-06-26 PROCEDURE — 37000009 HC ANESTHESIA EA ADD 15 MINS: Mod: PO | Performed by: INTERNAL MEDICINE

## 2020-06-26 PROCEDURE — 45380 COLONOSCOPY AND BIOPSY: CPT | Mod: PO | Performed by: INTERNAL MEDICINE

## 2020-06-26 PROCEDURE — 88305 TISSUE EXAM BY PATHOLOGIST: CPT | Performed by: PATHOLOGY

## 2020-06-26 PROCEDURE — 37000008 HC ANESTHESIA 1ST 15 MINUTES: Mod: PO | Performed by: INTERNAL MEDICINE

## 2020-06-26 PROCEDURE — 88342 IMHCHEM/IMCYTCHM 1ST ANTB: CPT | Mod: 59 | Performed by: PATHOLOGY

## 2020-06-26 PROCEDURE — D9220A PRA ANESTHESIA: ICD-10-PCS | Mod: CRNA,,, | Performed by: NURSE ANESTHETIST, CERTIFIED REGISTERED

## 2020-06-26 PROCEDURE — 88341 IMHCHEM/IMCYTCHM EA ADD ANTB: CPT | Mod: 59 | Performed by: PATHOLOGY

## 2020-06-26 PROCEDURE — 88341 PR IHC OR ICC EACH ADD'L SINGLE ANTIBODY  STAINPR: ICD-10-PCS | Mod: 26,,, | Performed by: PATHOLOGY

## 2020-06-26 PROCEDURE — D9220A PRA ANESTHESIA: Mod: CRNA,,, | Performed by: NURSE ANESTHETIST, CERTIFIED REGISTERED

## 2020-06-26 PROCEDURE — 45380 PR COLONOSCOPY,BIOPSY: ICD-10-PCS | Mod: 52,,, | Performed by: INTERNAL MEDICINE

## 2020-06-26 PROCEDURE — 88305 TISSUE EXAM BY PATHOLOGIST: CPT | Mod: 26,,, | Performed by: PATHOLOGY

## 2020-06-26 PROCEDURE — 63600175 PHARM REV CODE 636 W HCPCS: Mod: PO | Performed by: INTERNAL MEDICINE

## 2020-06-26 PROCEDURE — 45380 COLONOSCOPY AND BIOPSY: CPT | Mod: 52,,, | Performed by: INTERNAL MEDICINE

## 2020-06-26 PROCEDURE — D9220A PRA ANESTHESIA: ICD-10-PCS | Mod: ANES,,, | Performed by: ANESTHESIOLOGY

## 2020-06-26 PROCEDURE — D9220A PRA ANESTHESIA: Mod: ANES,,, | Performed by: ANESTHESIOLOGY

## 2020-06-26 PROCEDURE — 88342 CHG IMMUNOCYTOCHEMISTRY: ICD-10-PCS | Mod: 26,,, | Performed by: PATHOLOGY

## 2020-06-26 PROCEDURE — 27201012 HC FORCEPS, HOT/COLD, DISP: Mod: PO | Performed by: INTERNAL MEDICINE

## 2020-06-26 PROCEDURE — 63600175 PHARM REV CODE 636 W HCPCS: Mod: PO | Performed by: NURSE ANESTHETIST, CERTIFIED REGISTERED

## 2020-06-26 PROCEDURE — 88305 TISSUE EXAM BY PATHOLOGIST: ICD-10-PCS | Mod: 26,,, | Performed by: PATHOLOGY

## 2020-06-26 PROCEDURE — 88341 IMHCHEM/IMCYTCHM EA ADD ANTB: CPT | Mod: 26,,, | Performed by: PATHOLOGY

## 2020-06-26 RX ORDER — PROPOFOL 10 MG/ML
VIAL (ML) INTRAVENOUS CONTINUOUS PRN
Status: DISCONTINUED | OUTPATIENT
Start: 2020-06-26 | End: 2020-06-26

## 2020-06-26 RX ORDER — PROPOFOL 10 MG/ML
VIAL (ML) INTRAVENOUS
Status: DISCONTINUED | OUTPATIENT
Start: 2020-06-26 | End: 2020-06-26

## 2020-06-26 RX ORDER — SODIUM CHLORIDE, SODIUM LACTATE, POTASSIUM CHLORIDE, CALCIUM CHLORIDE 600; 310; 30; 20 MG/100ML; MG/100ML; MG/100ML; MG/100ML
INJECTION, SOLUTION INTRAVENOUS CONTINUOUS
Status: DISCONTINUED | OUTPATIENT
Start: 2020-06-26 | End: 2020-06-26 | Stop reason: HOSPADM

## 2020-06-26 RX ORDER — SODIUM CHLORIDE 0.9 % (FLUSH) 0.9 %
10 SYRINGE (ML) INJECTION
Status: DISCONTINUED | OUTPATIENT
Start: 2020-06-26 | End: 2020-06-26 | Stop reason: HOSPADM

## 2020-06-26 RX ORDER — LIDOCAINE HYDROCHLORIDE 20 MG/ML
INJECTION INTRAVENOUS
Status: DISCONTINUED | OUTPATIENT
Start: 2020-06-26 | End: 2020-06-26

## 2020-06-26 RX ADMIN — PROPOFOL 150 MCG/KG/MIN: 10 INJECTION, EMULSION INTRAVENOUS at 01:06

## 2020-06-26 RX ADMIN — LIDOCAINE HYDROCHLORIDE 100 MG: 20 INJECTION, SOLUTION INTRAVENOUS at 01:06

## 2020-06-26 RX ADMIN — SODIUM CHLORIDE, SODIUM LACTATE, POTASSIUM CHLORIDE, AND CALCIUM CHLORIDE: .6; .31; .03; .02 INJECTION, SOLUTION INTRAVENOUS at 01:06

## 2020-06-26 RX ADMIN — PROPOFOL 100 MG: 10 INJECTION, EMULSION INTRAVENOUS at 01:06

## 2020-06-26 NOTE — ANESTHESIA PREPROCEDURE EVALUATION
06/26/2020  Anabell Madrid is a 80 y.o., female.    Pre-op Assessment    I have reviewed the Patient Summary Reports.     I have reviewed the Nursing Notes.       Review of Systems  Anesthesia Hx:  No problems with previous Anesthesia    Cardiovascular:   Hypertension, well controlled    Hepatic/GI:   GERD, well controlled        Physical Exam  General:  Well nourished    Airway/Jaw/Neck:  Airway Findings: Mallampati: II                Anesthesia Plan  Type of Anesthesia, risks & benefits discussed:  Anesthesia Type:  general  Patient's Preference:   Intra-op Monitoring Plan:   Intra-op Monitoring Plan Comments:   Post Op Pain Control Plan:   Post Op Pain Control Plan Comments:   Induction:   IV  Beta Blocker:  Patient is not currently on a Beta-Blocker (No further documentation required).       Informed Consent: Patient understands risks and agrees with Anesthesia plan.  Questions answered. Anesthesia consent signed with patient.  ASA Score: 2     Day of Surgery Review of History & Physical:    H&P update referred to the surgeon.         Ready For Surgery From Anesthesia Perspective.

## 2020-06-26 NOTE — PLAN OF CARE
Pt discharged to home.  Discharge instructions given, pt stated understanding.   IV removed.  Pt left via wheelchair with friend to home.

## 2020-06-26 NOTE — DISCHARGE SUMMARY
Discharge Note  Short Stay      SUMMARY     Admit Date: 6/26/2020    Attending Physician: Lenin Moreno MD     Discharge Physician: Lenin Moreno MD    Discharge Date: 6/26/2020 2:16 PM    Final Diagnosis: Diarrhea, unspecified type [R19.7]  Colon stricture [K56.699]    Disposition: HOME OR SELF CARE    Patient Instructions:   Current Discharge Medication List      CONTINUE these medications which have NOT CHANGED    Details   calcium 500 mg Tab Take 1,000 mg by mouth. Takes 2 tablets daily (1000mg)      CANNABIDIOL, CBD, EXTRACT ORAL Take by mouth 2 (two) times daily.       glucosamine-chondroitin 500-400 mg tablet Take 1 tablet by mouth 2 (two) times daily.      losartan (COZAAR) 50 MG tablet TAKE 1 TABLET BY MOUTH EVERY DAY  Qty: 90 tablet, Refills: 3      mv-min/folic/vit [id number] (ALIVE WOMEN'S 50 PLUS, BLEND, ORAL) Take by mouth.      NUFOLA 25-3.75-1-300 mg Cap TAKE 1 CAPSULE BY MOUTH EVERY DAY  Qty: 90 capsule, Refills: 4      omeprazole (PRILOSEC) 40 MG capsule Take 1 capsule (40 mg total) by mouth once daily.  Qty: 90 capsule, Refills: 3    Associated Diagnoses: Dyspepsia      trazodone (DESYREL) 50 MG tablet Take 50 mg by mouth every evening.  Refills: 5      vitamin D 1000 units Tab Take 185 mg by mouth once daily.      vitamins  A,C,E-zinc-copper (PRESERVISION AREDS) 14,320-226-200 unit-mg-unit Cap Take by mouth. Take 1 capsule once daily      bethanechol 5 mg/mL Susp PLACE 1ML UNDER TONGUE TWICE DAILY.      diphenhydrAMINE-aluminum-magnesium hydroxide-simethicone-lidocaine HCl 2% Swish and spit 15 mLs every 4 (four) hours as needed.  Qty: 100 mL, Refills: 1    Comments: Use as directed  Associated Diagnoses: Mucositis      omega 3-dha-epa-fish oil (FISH OIL) 1,000 mg (120 mg-180 mg) Cap Take 1 capsule by mouth once daily.  Qty: 90 capsule, Refills: 4      polyethylene glycol (MIRALAX) 17 gram PwPk Take by mouth daily as needed.      prochlorperazine (COMPAZINE) 10 MG tablet Take 1  tablet (10 mg total) by mouth every 6 (six) hours as needed.  Qty: 60 tablet, Refills: 3    Associated Diagnoses: Dyspepsia      Saccharomyces boulardii (FLORASTOR) 250 mg capsule Take 250 mg by mouth 2 (two) times daily.      tiZANidine (ZANAFLEX) 4 MG tablet Take 4 mg by mouth 3 (three) times daily as needed.      TURMERIC ORAL Take 30 mg by mouth once daily.             Discharge Procedure Orders (must include Diet, Follow-up, Activity)    Follow Up:  Follow up with PCP as previously scheduled  Resume routine diet.  Activity as tolerated.    No driving day of procedure.

## 2020-06-26 NOTE — [employer] PATIENT INSTRUCTIONS
Discharge Summary/Instructions after an Endoscopic Procedure  Patient Name: Anabell Madrid  Patient MRN: [id number]  Patient YOB: 1940 Friday, June 26, 2020  Lenin Moreno MD  RESTRICTIONS:  During your procedure today, you received medications for sedation.  These   medications may affect your judgment, balance and coordination.  Therefore,   for 24 hours, you have the following restrictions:   - DO NOT drive a car, operate machinery, make legal/financial decisions,   sign important papers or drink alcohol.    ACTIVITY:  Today: no heavy lifting, straining or running due to procedural   sedation/anesthesia.  The following day: return to full activity including work.  DIET:  Eat and drink normally unless instructed otherwise.     TREATMENT FOR COMMON SIDE EFFECTS:  - Mild abdominal pain, nausea, belching, bloating or excessive gas:  rest,   eat lightly and use a heating pad.  - Sore Throat: treat with throat lozenges and/or gargle with warm salt   water.  - Because air was used during the procedure, expelling large amounts of air   from your rectum or belching is normal.  - If a bowel prep was taken, you may not have a bowel movement for 1-3 days.    This is normal.  SYMPTOMS TO WATCH FOR AND REPORT TO YOUR PHYSICIAN:  1. Abdominal pain or bloating, other than gas cramps.  2. Chest pain.  3. Back pain.  4. Signs of infection such as: chills or fever occurring within 24 hours   after the procedure.  5. Rectal bleeding, which would show as bright red, maroon, or black stools.   (A tablespoon of blood from the rectum is not serious, especially if   hemorrhoids are present.)  6. Vomiting.  7. Weakness or dizziness.  GO DIRECTLY TO THE NEAREST EMERGENCY ROOM IF YOU HAVE ANY OF THE FOLLOWING:      Difficulty breathing              Chills and/or fever over 101 F   Persistent vomiting and/or vomiting blood   Severe abdominal pain   Severe chest pain   Black, tarry stools   Bleeding- more than one  tablespoon   Any other symptom or condition that you feel may need urgent attention  Your doctor recommends these additional instructions:  If any biopsies were taken, your doctors clinic will contact you in 1 to 2   weeks with any results.  We are waiting for your pathology results.   Your physician has recommended a repeat colonoscopy for retreatment.   You are being discharged to home.  For questions, problems or results please call your physician - Lenin Moreno MD at Work:  [phone].  EMERGENCY PHONE NUMBER: [phone], LAB RESULTS: [phone]  IF A COMPLICATION OR EMERGENCY SITUATION ARISES AND YOU ARE UNABLE TO REACH   YOUR PHYSICIAN - GO DIRECTLY TO THE EMERGENCY ROOM.  ___________________________________________  Nurse Signature  ___________________________________________  Patient/Designated Responsible Party Signature  Lenin Moreno MD  6/26/2020 2:16:01 PM  This report has been verified and signed electronically.  [employer]

## 2020-06-26 NOTE — H&P
History & Physical - Short Stay  Gastroenterology      SUBJECTIVE:     Procedure: Colonoscopy    Chief Complaint/Indication for Procedure: Change in Bowel Habits; Colon stricture.    PTA Medications   Medication Sig    calcium 500 mg Tab Take 1,000 mg by mouth. Takes 2 tablets daily (1000mg)    CANNABIDIOL, CBD, EXTRACT ORAL Take by mouth 2 (two) times daily.     glucosamine-chondroitin 500-400 mg tablet Take 1 tablet by mouth 2 (two) times daily.    losartan (COZAAR) 50 MG tablet TAKE 1 TABLET BY MOUTH EVERY DAY    mv-min/folic/vit [id number] (ALIVE WOMEN'S 50 PLUS, BLEND, ORAL) Take by mouth.    NUFOLA 25-3.75-1-300 mg Cap TAKE 1 CAPSULE BY MOUTH EVERY DAY    omeprazole (PRILOSEC) 40 MG capsule Take 1 capsule (40 mg total) by mouth once daily.    trazodone (DESYREL) 50 MG tablet Take 50 mg by mouth every evening.    vitamin D 1000 units Tab Take 185 mg by mouth once daily.    vitamins  A,C,E-zinc-copper (PRESERVISION AREDS) 14,320-226-200 unit-mg-unit Cap Take by mouth. Take 1 capsule once daily    bethanechol 5 mg/mL Susp PLACE 1ML UNDER TONGUE TWICE DAILY.    diphenhydrAMINE-aluminum-magnesium hydroxide-simethicone-lidocaine HCl 2% Swish and spit 15 mLs every 4 (four) hours as needed. (Patient not taking: Reported on 5/15/2020)    omega 3-dha-epa-fish oil (FISH OIL) 1,000 mg (120 mg-180 mg) Cap Take 1 capsule by mouth once daily. (Patient not taking: Reported on 6/5/2020)    polyethylene glycol (MIRALAX) 17 gram PwPk Take by mouth daily as needed.    prochlorperazine (COMPAZINE) 10 MG tablet Take 1 tablet (10 mg total) by mouth every 6 (six) hours as needed.    Saccharomyces boulardii (FLORASTOR) 250 mg capsule Take 250 mg by mouth 2 (two) times daily.    tiZANidine (ZANAFLEX) 4 MG tablet Take 4 mg by mouth 3 (three) times daily as needed.    TURMERIC ORAL Take 30 mg by mouth once daily.       Review of patient's allergies indicates:  No Known Allergies     Past Medical History:   Diagnosis  Date    Anemia     Cancer 2009    non-hodkins lymphoma-remission 5 yrs in 4/14    Eye hemorrhage     12/2015    Osteoporosis     Sleep apnea     uses dental mouthpiece, no cpap/bipap     Past Surgical History:   Procedure Laterality Date    BONE MARROW ASPIRATION Bilateral 12/2/2019    Procedure: ASPIRATION, BONE MARROW;  Surgeon: Guevara Fuller MD;  Location: [facility] OR;  Service: Oncology;  Laterality: Bilateral;    COLONOSCOPY  ~2004    normal findings per patient report    colonoscopy  01/02/2020        COLONOSCOPY N/A 1/2/2020    Procedure: COLONOSCOPY;  Surgeon: Zander Casper MD;  Location: [facility] ENDO;  Service: Endoscopy;  Laterality: N/A;    DILATION AND CURETTAGE OF UTERUS      ESOPHAGOGASTRODUODENOSCOPY  01/02/2020        ESOPHAGOGASTRODUODENOSCOPY N/A 1/2/2020    Procedure: EGD (ESOPHAGOGASTRODUODENOSCOPY);  Surgeon: Zander Casper MD;  Location: [facility] ENDO;  Service: Endoscopy;  Laterality: N/A;    TONSILLECTOMY       Family History   Problem Relation Age of Onset    Diabetes Mother     Heart failure Mother     Cancer Mother         lymphoma non-hodkins    Heart attack Father     Diabetes Brother     Cancer Maternal Aunt         bladder cancer    Cancer Maternal Uncle     Diabetes Maternal Uncle     Diabetes Maternal Grandmother     Cancer Maternal Grandfather         prostate cancer    Colon cancer Neg Hx     Crohn's disease Neg Hx     Esophageal cancer Neg Hx     Stomach cancer Neg Hx     Ulcerative colitis Neg Hx      Social History     Tobacco Use    Smoking status: Never Smoker    Smokeless tobacco: Never Used   Substance Use Topics    Alcohol use: Not Currently     Comment: one glass of wine a night, not every night    Drug use: Yes     Types: Marijuana     Comment: taking medical marijuana oil bid orally         OBJECTIVE:     Vital Signs (Most Recent)  Temp: 98.8 °F (37.1 °C) (06/26/20 1317)  Pulse: 98 (06/26/20 1317)  Resp: 16  (06/26/20 1317)  BP: 124/66 (06/26/20 1317)  SpO2: 97 % (06/26/20 1317)    Physical Exam:                                                       GENERAL:  Comfortable, in no acute distress.                                 HEENT EXAM:  Nonicteric.  No adenopathy.  Oropharynx is clear.               NECK:  Supple.                                                               LUNGS:  Clear.                                                               CARDIAC:  Regular rate and rhythm.  S1, S2.  No murmur.                      ABDOMEN:  Soft, positive bowel sounds, nontender.  No hepatosplenomegaly or masses.  No rebound or guarding.                                             EXTREMITIES:  No edema.     MENTAL STATUS:  Normal, alert and oriented.      ASSESSMENT/PLAN:     Assessment: Change in Bowel Habits; Colon Stricture.    Plan: Colonoscopy    Anesthesia Plan: General    ASA Grade: ASA 2 - Patient with mild systemic disease with no functional limitations    MALLAMPATI SCORE:  I (soft palate, uvula, fauces, and tonsillar pillars visible)     In my medical opinion and judgment, this medical or surgical procedure was not able to be safely postponed in accordance with [place] Department of Health, Healthcare Facility Notice #2020-[id number]-007.

## 2020-06-26 NOTE — ANESTHESIA POSTPROCEDURE EVALUATION
Anesthesia Post Evaluation    Patient: Anabell Madrid    Procedure(s) Performed: Procedure(s) (LRB):  COLONOSCOPY (N/A)    Final Anesthesia Type: general    Patient location during evaluation: PACU  Patient participation: Yes- Able to Participate  Level of consciousness: awake and alert, oriented and awake  Post-procedure vital signs: reviewed and stable  Pain management: adequate  Airway patency: patent    PONV status at discharge: No PONV  Anesthetic complications: no      Cardiovascular status: blood pressure returned to baseline and hemodynamically stable  Respiratory status: unassisted, spontaneous ventilation and room air  Hydration status: euvolemic  Follow-up not needed.          Vitals Value Taken Time   /65 06/26/20 1430   Temp 36.5 °C (97.7 °F) 06/26/20 1415   Pulse 81 06/26/20 1430   Resp 16 06/26/20 1430   SpO2 96 % 06/26/20 1430         Event Time   Out of Recovery 14:52:51         Pain/Yuan Score: Yuan Score: 10 (6/26/2020  2:52 PM)

## 2020-06-26 NOTE — TRANSFER OF CARE
"Anesthesia Transfer of Care Note    Patient: Anabell Madrid    Procedure(s) Performed: Procedure(s) (LRB):  COLONOSCOPY (N/A)    Patient location: PACU    Anesthesia Type: general    Transport from OR: Transported from OR on room air with adequate spontaneous ventilation    Post pain: adequate analgesia    Post assessment: no apparent anesthetic complications and tolerated procedure well    Post vital signs: stable    Level of consciousness: responds to stimulation    Nausea/Vomiting: no nausea/vomiting    Complications: none    Transfer of care protocol was followed      Last vitals:   Visit Vitals  /66 (BP Location: Right arm, Patient Position: Lying)   Pulse 98   Temp 37.1 °C (98.8 °F) (Skin)   Resp 16   Ht 4' 11" (1.499 m)   Wt 42.2 kg (93 lb)   SpO2 97%   Breastfeeding No   BMI 18.78 kg/m²     "

## 2020-07-06 LAB
COMMENT: NORMAL
FINAL PATHOLOGIC DIAGNOSIS: NORMAL
GROSS: NORMAL
Lab: NORMAL

## 2020-07-07 ENCOUNTER — HOSPITAL ENCOUNTER (OUTPATIENT)
Dept: RADIOLOGY | Facility: HOSPITAL | Age: 80
Discharge: HOME | End: 2020-07-07
Attending: INTERNAL MEDICINE
Payer: MEDICARE

## 2020-07-07 DIAGNOSIS — E04.2 MULTINODULAR GOITER: ICD-10-CM

## 2020-07-07 DIAGNOSIS — I10 BENIGN ESSENTIAL HTN: ICD-10-CM

## 2020-07-07 DIAGNOSIS — E87.1 HYPONATREMIA: ICD-10-CM

## 2020-07-07 PROCEDURE — 76536 US EXAM OF HEAD AND NECK: CPT | Mod: TC,PO

## 2020-07-07 PROCEDURE — 76536 US SOFT TISSUE HEAD NECK THYROID: ICD-10-PCS | Mod: 26,,, | Performed by: RADIOLOGY

## 2020-07-07 PROCEDURE — 76536 US EXAM OF HEAD AND NECK: CPT | Mod: 26,,, | Performed by: RADIOLOGY

## 2020-07-15 ENCOUNTER — OFFICE VISIT (OUTPATIENT)
Dept: ENDOCRINOLOGY | Facility: CLINIC | Age: 80
End: 2020-07-15
Payer: MEDICARE

## 2020-07-15 ENCOUNTER — LAB VISIT (OUTPATIENT)
Dept: LAB | Facility: HOSPITAL | Age: 80
End: 2020-07-15
Attending: INTERNAL MEDICINE
Payer: MEDICARE

## 2020-07-15 VITALS
HEIGHT: 59 IN | HEART RATE: 76 BPM | SYSTOLIC BLOOD PRESSURE: 112 MMHG | DIASTOLIC BLOOD PRESSURE: 66 MMHG | WEIGHT: 100.19 LBS | RESPIRATION RATE: 20 BRPM | BODY MASS INDEX: 20.2 KG/M2

## 2020-07-15 DIAGNOSIS — I10 BENIGN ESSENTIAL HTN: ICD-10-CM

## 2020-07-15 DIAGNOSIS — E87.5 HYPERKALEMIA: ICD-10-CM

## 2020-07-15 DIAGNOSIS — E04.2 MULTINODULAR GOITER: ICD-10-CM

## 2020-07-15 DIAGNOSIS — R94.6 ABNORMAL THYROID FUNCTION TEST: ICD-10-CM

## 2020-07-15 DIAGNOSIS — E87.1 HYPONATREMIA: ICD-10-CM

## 2020-07-15 DIAGNOSIS — E87.1 HYPONATREMIA: Primary | ICD-10-CM

## 2020-07-15 LAB — POTASSIUM SERPL-SCNC: 5.4 MMOL/L (ref 3.5–5.1)

## 2020-07-15 PROCEDURE — 99999 PR PBB SHADOW E&M-EST. PATIENT-LVL IV: CPT | Mod: PBBFAC,,, | Performed by: INTERNAL MEDICINE

## 2020-07-15 PROCEDURE — 99214 PR OFFICE/OUTPT VISIT, EST, LEVL IV, 30-39 MIN: ICD-10-PCS | Mod: S$PBB,,, | Performed by: INTERNAL MEDICINE

## 2020-07-15 PROCEDURE — 99214 OFFICE O/P EST MOD 30 MIN: CPT | Mod: S$PBB,,, | Performed by: INTERNAL MEDICINE

## 2020-07-15 PROCEDURE — 36415 COLL VENOUS BLD VENIPUNCTURE: CPT | Mod: PO

## 2020-07-15 PROCEDURE — 84132 ASSAY OF SERUM POTASSIUM: CPT | Mod: PO

## 2020-07-15 PROCEDURE — 99214 OFFICE O/P EST MOD 30 MIN: CPT | Mod: PBBFAC,PO | Performed by: INTERNAL MEDICINE

## 2020-07-15 PROCEDURE — 99999 PR PBB SHADOW E&M-EST. PATIENT-LVL IV: ICD-10-PCS | Mod: PBBFAC,,, | Performed by: INTERNAL MEDICINE

## 2020-07-15 NOTE — PROGRESS NOTES
CHIEF COMPLAINT: THYROID NODULE  80 year old being seen as a f/u. Had FNA 3/15 on the left that was benign. No XRT to head/neck. She is monitoring liquid intake- approx 1 liter. No N/V. No supplements. No biotin. No Difficulty swallowing. Has noticed some hoarseness.           PAST MEDICAL HISTORY/PAST SURGICAL HISTORY:  Reviewed in [facility]    SOCIAL HISTORY: No Tobacco. 1 glass of wine a day    FAMILY HISTORY:  No known thyroid disease or thyroid cancer. + DM. + Heart disease    MEDICATIONS/ALLERGIES: The patient's MedCard has been updated and reviewed.      ROS:   Constitutional: weight decreased. States has decreased appetite.   Eyes: No recent visual changes  ENT: No dysphagia  Cardiovascular: Denies current anginal symptoms  Respiratory: No SOB.    Gastrointestinal: States being treated for inflammatory bowel disease   GenitoUrinary - No dysuria  Skin: No new skin rash  Neurologic: No focal neurologic complaints  Remainder ROS negative        PE:    GENERAL: Well developed, well nourished.  ENT: Nares patent, oropharynx clear, mucosa pink,   NECK: Supple, trachea midline, no palpable nodules  CHEST: Resp even and unlabored, CTA bilateral.  CARDIAC: RRR, S1, S2 heard, no murmurs, rubs, S3, or S4      Results for GALINDO NGO (MRN [id number]) as of 7/15/2020 11:00   Ref. Range 7/7/2020 11:34   Sodium Latest Ref Range: 136 - 145 mmol/L 134 (L)   Potassium Latest Ref Range: 3.5 - 5.1 mmol/L 5.6 (H)   Chloride Latest Ref Range: 95 - 110 mmol/L 98   CO2 Latest Ref Range: 23 - 29 mmol/L 26   Anion Gap Latest Ref Range: 8 - 16 mmol/L 10   BUN, Bld Latest Ref Range: 8 - 23 mg/dL 21   Creatinine Latest Ref Range: 0.5 - 1.4 mg/dL 0.9   eGFR if non African American Latest Ref Range: >60 mL/min/1.73 m^2 59.7 (A)   eGFR if African American Latest Ref Range: >60 mL/min/1.73 m^2 >60.0   Glucose Latest Ref Range: 70 - 110 mg/dL 61 (L)   Calcium Latest Ref Range: 8.7 - 10.5 mg/dL 10.2   Alkaline Phosphatase Latest Ref Range:  55 - 135 U/L 50 (L)   PROTEIN TOTAL Latest Ref Range: 6.0 - 8.4 g/dL 7.1   Albumin Latest Ref Range: 3.5 - 5.2 g/dL 4.2   BILIRUBIN TOTAL Latest Ref Range: 0.1 - 1.0 mg/dL 0.5   AST Latest Ref Range: 10 - 40 U/L 24   ALT Latest Ref Range: 10 - 44 U/L 24   TSH Latest Ref Range: 0.400 - 4.000 uIU/mL 4.113 (H)   Free T4 Latest Ref Range: 0.71 - 1.51 ng/dL 0.91     US SOFT TISSUE HEAD NECK THYROID     CLINICAL HISTORY:  Hypo-osmolality and hyponatremia     TECHNIQUE:  Ultrasound of the thyroid and cervical lymph nodes was performed.     COMPARISON:  Thyroid ultrasound dated 06/24/2019     FINDINGS:  The right lobe of the gland remains small, mildly heterogeneous in echotexture.  There is a small 1 mm cyst in the medial midpole.  The right lobe measures 1.6 x 0.5 x 0.8 cm with an estimated volume of 0.3 mL.     The thyroid isthmus measures approximately 1 mm in thickness as well.     The left lobe measures 4.1 x 1.5 x 1.5 cm with an estimated volume of 4.8 mL.  Total thyroid volume is 5.1 mL, decreased compared to 6.8 mL on the prior study.  Redemonstrated is a complex mostly solid nodule in the lateral midpole of the left lobe which does contain microcalcifications.  This nodule measures 1.0 x 0.7 x 0.8 cm without significant change.  There is also a stable solid hyperechoic nodule in the lower pole measuring approximately 6 mm.  Several smaller cystic nodules are present throughout the gland as well.     There are no abnormal lymph nodes identified in the neck.     Impression:     1. There is no detrimental change in the appearance of the thyroid gland compared to the prior study.  The right lobe remains small and slightly heterogeneous.  There is a dominant complex mostly solid nodule in the lateral midpole of the left lobe containing microcalcifications which is unchanged in size.  There is no new nodule which meets criteria for FNA or biopsy.    ASSESSMENT/PLAN:  1. Thyroid Nodules- She has had a benign FNA. No  obstructive symptoms. Ultrasound shows no change     2. Hyponatremia- Na at low end of normal but acceptable.     3. Elevated TSH- TSH mildly elevated. Can repeat in several months. If increases or becomes symptomatic, can consider Tx.     4. HTN- BP controlled. Continue current Tx.     5. Hyperkalemia- Check potassium. Possibly lab error    FOLLOWUP  Needs potassium   4 months- TSH  F/U 1 year with TSH, CMP, thyroid US

## 2020-07-16 DIAGNOSIS — K56.699 COLONIC STRICTURE: Primary | ICD-10-CM

## 2020-07-18 ENCOUNTER — TELEPHONE (OUTPATIENT)
Dept: ENDOCRINOLOGY | Facility: CLINIC | Age: 80
End: 2020-07-18

## 2020-07-18 DIAGNOSIS — I10 BENIGN ESSENTIAL HTN: Primary | ICD-10-CM

## 2020-07-18 RX ORDER — LOSARTAN POTASSIUM 25 MG/1
25 TABLET ORAL DAILY
Qty: 30 TABLET | Refills: 3 | Status: SHIPPED | OUTPATIENT
Start: 2020-07-18 | End: 2020-10-14

## 2020-07-18 NOTE — TELEPHONE ENCOUNTER
Let her know potassium is high  Decrease losartan to 25 mg daily  Check BMP 4 weeks  See if she can check her BP at home and let us know if > 140/90

## 2020-07-21 ENCOUNTER — TELEPHONE (OUTPATIENT)
Dept: ENDOCRINOLOGY | Facility: CLINIC | Age: 80
End: 2020-07-21

## 2020-07-21 NOTE — TELEPHONE ENCOUNTER
----- Message from Jamil Clark sent at 7/21/2020  8:39 AM CDT -----  Regarding: results  Contact: patient  Type:  Test Results    Who Called:  patient  Name of Test (Lab/Mammo/Etc):  labs  Date of Test:  7/15/2020  Ordering Provider:  Shan  Where the test was performed:  [address] [facility] [address]  Best Call Back Number:  [phone]  Additional Information:  n/a

## 2020-07-24 ENCOUNTER — TELEPHONE (OUTPATIENT)
Dept: ENDOCRINOLOGY | Facility: CLINIC | Age: 80
End: 2020-07-24

## 2020-08-18 ENCOUNTER — LAB VISIT (OUTPATIENT)
Dept: LAB | Facility: HOSPITAL | Age: 80
End: 2020-08-18
Attending: INTERNAL MEDICINE
Payer: MEDICARE

## 2020-08-18 DIAGNOSIS — I10 BENIGN ESSENTIAL HTN: ICD-10-CM

## 2020-08-18 PROCEDURE — 80048 BASIC METABOLIC PNL TOTAL CA: CPT

## 2020-08-18 PROCEDURE — 36415 COLL VENOUS BLD VENIPUNCTURE: CPT | Mod: PO

## 2020-08-19 LAB
ANION GAP SERPL CALC-SCNC: 8 MMOL/L (ref 8–16)
BUN SERPL-MCNC: 17 MG/DL (ref 8–23)
CALCIUM SERPL-MCNC: 9.4 MG/DL (ref 8.7–10.5)
CHLORIDE SERPL-SCNC: 97 MMOL/L (ref 95–110)
CO2 SERPL-SCNC: 27 MMOL/L (ref 23–29)
CREAT SERPL-MCNC: 0.8 MG/DL (ref 0.5–1.4)
EST. GFR  (AFRICAN AMERICAN): >60 ML/MIN/1.73 M^2
EST. GFR  (NON AFRICAN AMERICAN): >60 ML/MIN/1.73 M^2
GLUCOSE SERPL-MCNC: 89 MG/DL (ref 70–110)
POTASSIUM SERPL-SCNC: 4.5 MMOL/L (ref 3.5–5.1)
SODIUM SERPL-SCNC: 132 MMOL/L (ref 136–145)

## 2020-08-23 ENCOUNTER — TELEPHONE (OUTPATIENT)
Dept: ENDOCRINOLOGY | Facility: CLINIC | Age: 80
End: 2020-08-23

## 2020-08-23 DIAGNOSIS — E87.1 HYPONATREMIA: Primary | ICD-10-CM

## 2020-08-28 ENCOUNTER — TELEPHONE (OUTPATIENT)
Dept: GASTROENTEROLOGY | Facility: CLINIC | Age: 80
End: 2020-08-28

## 2020-08-28 NOTE — TELEPHONE ENCOUNTER
----- Message from Zander Casper MD sent at 8/28/2020  8:24 AM CDT -----  This patient needs appointment to see Dr. Caro in clinic. Referral placed.

## 2020-09-17 ENCOUNTER — TELEPHONE (OUTPATIENT)
Dept: HEMATOLOGY/ONCOLOGY | Facility: CLINIC | Age: 80
End: 2020-09-17

## 2020-09-17 NOTE — TELEPHONE ENCOUNTER
----- Message from Sheila Falcon MA sent at 9/17/2020  3:14 PM CDT -----  Type:  RX Refill Request    Who Called:  Anabell  Refill or New Rx:  refill  RX Name and Strength:  diphenhydrAMINE-aluminum-magnesium hydroxide-simethicone-lidocaine HCl 2% 100 mL 1 1/20/2020 Sig - Route: Swish and spit 15 mLs every 4 (four) hours as needed. - Swish & Spit  How is the patient currently taking it? (ex. 1XDay):  See Above  Is this a 30 day or 90 day RX:  30  Preferred Pharmacy with phone number:    [facility]/pharmacy #[id number] - [address] - [address]  [place] [address]  Phone: [phone] Fax: [phone]  Local or Mail Order:  local  Ordering Provider:    Best Call Back Number:  [phone]  Additional Information:  patient is out of the mouth wash

## 2020-09-17 NOTE — TELEPHONE ENCOUNTER
As requested, refill X 2 called to [facility] in [place] for Dr. GRAHAM's Mouthwash, called patient and notified.

## 2020-09-21 ENCOUNTER — TELEPHONE (OUTPATIENT)
Dept: CARDIOLOGY | Facility: CLINIC | Age: 80
End: 2020-09-21

## 2020-09-21 NOTE — TELEPHONE ENCOUNTER
----- Message from Rachell Alvarenga sent at 9/18/2020  4:59 PM CDT -----  Type:  Sooner Appointment Request    Caller is requesting a sooner appointment.      Name of Caller:  Son (Heri)  When is the first available appointment? October 6th  Symptoms: Patient went to ER for rapid heart rate and irregular EKG  Best Call Back Number:  [phone]  Additional Information:

## 2020-09-23 PROBLEM — M85.859 OSTEOPENIA OF NECK OF FEMUR: Status: ACTIVE | Noted: 2020-09-23

## 2020-09-30 PROBLEM — I47.10 SUPRAVENTRICULAR TACHYCARDIA: Status: ACTIVE | Noted: 2020-09-30

## 2020-10-01 ENCOUNTER — PATIENT MESSAGE (OUTPATIENT)
Dept: SURGERY | Facility: CLINIC | Age: 80
End: 2020-10-01

## 2020-10-01 ENCOUNTER — TELEPHONE (OUTPATIENT)
Dept: SURGERY | Facility: CLINIC | Age: 80
End: 2020-10-01

## 2020-10-01 NOTE — TELEPHONE ENCOUNTER
Spoke to pt, conf loc of ov 10/07/20. New terry letter mailed out and portal mess sent with location info as well

## 2020-10-07 ENCOUNTER — OFFICE VISIT (OUTPATIENT)
Dept: SURGERY | Facility: CLINIC | Age: 80
End: 2020-10-07
Payer: MEDICARE

## 2020-10-07 VITALS
SYSTOLIC BLOOD PRESSURE: 100 MMHG | DIASTOLIC BLOOD PRESSURE: 58 MMHG | BODY MASS INDEX: 19.3 KG/M2 | HEIGHT: 60 IN | WEIGHT: 98.31 LBS

## 2020-10-07 DIAGNOSIS — K56.699 COLONIC STRICTURE: Primary | ICD-10-CM

## 2020-10-07 PROCEDURE — 99203 PR OFFICE/OUTPT VISIT, NEW, LEVL III, 30-44 MIN: ICD-10-PCS | Mod: S$PBB,,, | Performed by: COLON & RECTAL SURGERY

## 2020-10-07 PROCEDURE — 99999 PR PBB SHADOW E&M-EST. PATIENT-LVL IV: ICD-10-PCS | Mod: PBBFAC,,, | Performed by: COLON & RECTAL SURGERY

## 2020-10-07 PROCEDURE — 99214 OFFICE O/P EST MOD 30 MIN: CPT | Mod: PBBFAC | Performed by: COLON & RECTAL SURGERY

## 2020-10-07 PROCEDURE — 99999 PR PBB SHADOW E&M-EST. PATIENT-LVL IV: CPT | Mod: PBBFAC,,, | Performed by: COLON & RECTAL SURGERY

## 2020-10-07 PROCEDURE — 99203 OFFICE O/P NEW LOW 30 MIN: CPT | Mod: S$PBB,,, | Performed by: COLON & RECTAL SURGERY

## 2020-10-07 NOTE — LETTER
October 9, 2020      Zander Casper MD  [address] [facility] [address]  [facility] [address]           [facility]  [address]  [facility] [address]  Phone: [phone]  Fax: [phone]          Patient: Anabell Madrid   MR Number: [id number]   YOB: 1940   Date of Visit: 10/7/2020       Dear Dr. Zander Casper:    Thank you for referring Anabell Madrid to me for evaluation. Attached you will find relevant portions of my assessment and plan of care.    If you have questions, please do not hesitate to call me. I look forward to following Anabell Madrid along with you.    Sincerely,    RONAL Caro MD    Enclosure  CC:  No Recipients    If you would like to receive this communication electronically, please contact [email or web address] or [phone] to request more information on Re-APP Link access.    For providers and/or their staff who would like to refer a patient to [facility], please contact us through our one-stop-shop provider referral line, [facility], at [phone].    If you feel you have received this communication in error or would no longer like to receive these types of communications, please e-mail [email or web address]

## 2020-10-07 NOTE — Clinical Note
Hi Zander  BE and CTE as you may know did not reveal colonic stricture.  Currently she is completely asymptomatic.  I informed her and her son that no further testing or intervention would be necessary.    I did recommend that repeat colonoscopy should be considered in six months and I have referred her back to you for this to be done.    Please let me know if I can be of any further help  Vivek Caro

## 2020-10-07 NOTE — PROGRESS NOTES
CRS Office Visit    SUBJECTIVE:     Chief Complaint: colonic stricture noted on colonoscopy    History of Present Illness:  Patient is a 80 y.o. female presents with recent diagnosis of colonic stricture on colonoscopy. The patient reports that one year ago, she developed significant GI symptoms - including nausea, vomiting, loose stools, and loss of appetite.  She reports that around this time, she lost 10 lbs.  In the course of the work up for all of her GI symptoms, she has undergone two colonoscopies - both of which demonstrated strictures of the transverse colon.  She has also undergone a barium enema and a CT colonography - neither of which demonstrated any strictures.    The patient reports that since the barium enema, she has had normal bowel movements and that her nausea, vomiting are gone.  She has since gained back 3-5 lbs and feels more normal that she has in a while.  She does report that her appetite is not back to normal.    Review of patient's allergies indicates:  No Known Allergies    Past Medical History:   Diagnosis Date    Anemia     Cancer 2009    non-hodkins lymphoma-remission 5 yrs in 4/14    Eye hemorrhage     12/2015    Osteoporosis     Sleep apnea     uses dental mouthpiece, no cpap/bipap     Past Surgical History:   Procedure Laterality Date    BONE MARROW ASPIRATION Bilateral 12/2/2019    Procedure: ASPIRATION, BONE MARROW;  Surgeon: Guevara Fuller MD;  Location: [facility] OR;  Service: Oncology;  Laterality: Bilateral;    COLONOSCOPY  ~2004    normal findings per patient report    colonoscopy  01/02/2020        COLONOSCOPY N/A 1/2/2020    Procedure: COLONOSCOPY;  Surgeon: aZnder Casper MD;  Location: [facility] ENDO;  Service: Endoscopy;  Laterality: N/A;    COLONOSCOPY N/A 6/26/2020    Procedure: COLONOSCOPY;  Surgeon: Lenin Moreno MD;  Location: [facility] ENDO;  Service: Endoscopy;  Laterality: N/A;    DILATION AND CURETTAGE OF UTERUS       ESOPHAGOGASTRODUODENOSCOPY  01/02/2020        ESOPHAGOGASTRODUODENOSCOPY N/A 1/2/2020    Procedure: EGD (ESOPHAGOGASTRODUODENOSCOPY);  Surgeon: Zander Casper MD;  Location: [facility];  Service: Endoscopy;  Laterality: N/A;    TONSILLECTOMY       Family History   Problem Relation Age of Onset    Diabetes Mother     Heart failure Mother     Cancer Mother         lymphoma non-hodkins    Heart attack Father     Diabetes Brother     Cancer Maternal Aunt         bladder cancer    Cancer Maternal Uncle     Diabetes Maternal Uncle     Diabetes Maternal Grandmother     Cancer Maternal Grandfather         prostate cancer    Colon cancer Neg Hx     Crohn's disease Neg Hx     Esophageal cancer Neg Hx     Stomach cancer Neg Hx     Ulcerative colitis Neg Hx      Social History     Tobacco Use    Smoking status: Never Smoker    Smokeless tobacco: Never Used   Substance Use Topics    Alcohol use: Not Currently     Comment: one glass of wine a night, not every night    Drug use: Yes     Types: Marijuana     Comment: taking medical marijuana oil bid orally        Review of Systems:  Constitutional: no fever or chills  Eyes: no visual changes  ENT: no nasal congestion or sore throat  Respiratory: no cough or shortness of breath  Cardiovascular: negative for lower extremity edema, near-syncope and orthopnea  Gastrointestinal: no nausea or vomiting, tolerating diet  Genitourinary: no hematuria or dysuria  Integument/Breast: no rash or pruritis  Hematologic/Lymphatic: no easy bruising or lymphadenopathy  Musculoskeletal: no arthralgias or myalgias  Neurological: no seizures or tremors  Behavioral/Psych: no auditory or visual hallucinations  Endocrine: no heat or cold intolerance    OBJECTIVE:     Vital Signs (Most Recent)  BP: (!) 100/58 (10/07/20 1020)    Physical Exam:  General: White female in NAD sitting in chair in clinic  Neuro: aaox4 maex4 perrl  Respiratory: resps even unlabored  Cardiac:  cap refill <2 sec  Abdomen: Abdomen soft, nontender. BS normal. No masses, organomegaly or scars.  Extremities: Warm dry and intact  Anorectal: deferred    ASSESSMENT/PLAN:     Colonic stricture    At this time, the patient doesn't appear to be experiencing any symptoms related to the strictures identified on colonoscopy.  Imaging do not reveal a significant stricture; no dilation or stenosis seen.      No surgical intervention is warranted at this time    We would recommend that the patient undergo a repeat colonoscopy, just to re-evaluate the strictures in the next 6 months   No need for regularly scheduled follow up in clinic with the CRS team    The above was discussed at length with the patient and her son.  Patient expressed understanding of the above and would like to proceed with the plan as outlined above.    I have personally obtained a history and performed a physical exam with and independent to my resident and discussed the findings and plan with the patient.  I agree with the above findings and plan with the following exceptions:  None    H, Vivek Caro MD, FACS, FASCRS  Staff Surgeon  Dept of Colon and Rectal Surgery  [facility] [place], [place]

## 2020-11-12 ENCOUNTER — LAB VISIT (OUTPATIENT)
Dept: LAB | Facility: HOSPITAL | Age: 80
End: 2020-11-12
Attending: INTERNAL MEDICINE
Payer: MEDICARE

## 2020-11-12 DIAGNOSIS — I10 BENIGN ESSENTIAL HTN: ICD-10-CM

## 2020-11-12 DIAGNOSIS — E87.5 HYPERKALEMIA: ICD-10-CM

## 2020-11-12 DIAGNOSIS — E87.1 HYPONATREMIA: ICD-10-CM

## 2020-11-12 DIAGNOSIS — E04.2 MULTINODULAR GOITER: ICD-10-CM

## 2020-11-12 LAB
ALBUMIN SERPL BCP-MCNC: 4.1 G/DL (ref 3.5–5.2)
ALP SERPL-CCNC: 51 U/L (ref 55–135)
ALT SERPL W/O P-5'-P-CCNC: 14 U/L (ref 10–44)
ANION GAP SERPL CALC-SCNC: 9 MMOL/L (ref 8–16)
AST SERPL-CCNC: 16 U/L (ref 10–40)
BILIRUB SERPL-MCNC: 0.3 MG/DL (ref 0.1–1)
BUN SERPL-MCNC: 13 MG/DL (ref 8–23)
CALCIUM SERPL-MCNC: 10.1 MG/DL (ref 8.7–10.5)
CHLORIDE SERPL-SCNC: 100 MMOL/L (ref 95–110)
CO2 SERPL-SCNC: 28 MMOL/L (ref 23–29)
CREAT SERPL-MCNC: 0.8 MG/DL (ref 0.5–1.4)
EST. GFR  (AFRICAN AMERICAN): >60 ML/MIN/1.73 M^2
EST. GFR  (NON AFRICAN AMERICAN): >60 ML/MIN/1.73 M^2
GLUCOSE SERPL-MCNC: 92 MG/DL (ref 70–110)
POTASSIUM SERPL-SCNC: 4.4 MMOL/L (ref 3.5–5.1)
PROT SERPL-MCNC: 6.9 G/DL (ref 6–8.4)
SODIUM SERPL-SCNC: 137 MMOL/L (ref 136–145)
TSH SERPL DL<=0.005 MIU/L-ACNC: 3.06 UIU/ML (ref 0.4–4)

## 2020-11-12 PROCEDURE — 36415 COLL VENOUS BLD VENIPUNCTURE: CPT | Mod: PO

## 2020-11-12 PROCEDURE — 84443 ASSAY THYROID STIM HORMONE: CPT

## 2020-11-12 PROCEDURE — 80053 COMPREHEN METABOLIC PANEL: CPT

## 2020-12-03 NOTE — PROGRESS NOTES
CHIEF COMPLAINT: THYROID NODULE  77 year old being seen as a f/u. Had FNA 3/15 on the left that was benign. Overall feeling well. No palpitations. No tremors. No N/V. No difficulty swallowing. No change in voice.           PAST MEDICAL HISTORY/PAST SURGICAL HISTORY:  Reviewed in [facility]    SOCIAL HISTORY: No Tobacco. 1 glass of wine a day    FAMILY HISTORY:  No known thyroid disease or thyroid cancer. + DM. + Heart disease    MEDICATIONS/ALLERGIES: The patient's MedCard has been updated and reviewed.      ROS:   Constitutional: No recent significant weight change  Eyes: No recent visual changes  ENT: No dysphagia  Cardiovascular: Denies current anginal symptoms  Respiratory: Denies current respiratory difficulty  Gastrointestinal: Denies recent bowel disturbances  GenitoUrinary - No dysuria  Skin: No new skin rash  Neurologic: No focal neurologic complaints  Remainder ROS negative        PE:    GENERAL: Well developed, well nourished.  PSYCH:  appropriate mood and affect  EYES:  PERRL, EOM intact.  ENT: Nares patent, oropharynx clear, mucosa pink,   NECK: Supple, trachea midline, no palpable nodules  CHEST: Resp even and unlabored, CTA bilateral.  CARDIAC: RRR, S1, S2 heard, no murmurs, rubs, S3, or S4    Results for GALINDO NGO (MRN [id number]) as of 6/22/2017 09:00   Ref. Range 6/20/2017 09:53   TSH Latest Ref Range: 0.400 - 4.000 uIU/mL 2.675     THYROID US:  Echo thyroidwith comparison to 6/20/16    The right lobe of thyroid gland measures 1.5 x 0.4 x 0.8 cm in size.  The left lobe the thyroid gland measures 3.3 x 1.4 x 1.4cm in size.  This gives an approximate volume of on the right of 0.3cc and an approximate volume on the left of 3.5 cc for a total approximate volume of 3.8 cc.    A heterogeneous thyroid gland is noted diffusely as can be seen with a history of thyroiditis or Graves' disease as well as multinodular goiter. A well-defined hypoechoic thyroid nodule on the left is noted of 1.1 cm with solid  Left Message - NEED FAX # AND APPROVAL FOR MRI,LLR   and cystic components as well as possible microcalcifications without detrimental change from 6/20/16 when measured in a similar manner. Comparison was made with 9/11/14 with similar stability suspected. Small hypoechoic 5 mm nodule is also noted just inferior to this is well-circumscribed with a small hypoechoic capsule unchanged since 9/11/14   Impression         1.Heterogeneous thyroid gland as can be seen with a history of thyroiditis, Graves' disease, or multinodular goiter. No convincing detrimental change since 9/11/14.           ASSESSMENT/PLAN:  1. Thyroid Nodules- She has had a benign FNA. US shows no change.     2. Hyponatremia- Na stable. Monitor fluid intake.     3. Suppressed TSH- Resolved.     FOLLOWUP  F/U 1 year with TSH, CMP, Thyroid US

## 2020-12-16 PROBLEM — F41.1 GENERALIZED ANXIETY DISORDER: Status: ACTIVE | Noted: 2020-12-16

## 2020-12-21 ENCOUNTER — PATIENT MESSAGE (OUTPATIENT)
Dept: OTHER | Facility: OTHER | Age: 80
End: 2020-12-21

## 2021-01-05 ENCOUNTER — TELEPHONE (OUTPATIENT)
Dept: ENDOCRINOLOGY | Facility: CLINIC | Age: 81
End: 2021-01-05

## 2021-05-10 ENCOUNTER — PATIENT MESSAGE (OUTPATIENT)
Dept: RESEARCH | Facility: HOSPITAL | Age: 81
End: 2021-05-10

## 2021-06-18 ENCOUNTER — TELEPHONE (OUTPATIENT)
Dept: HEMATOLOGY/ONCOLOGY | Facility: CLINIC | Age: 81
End: 2021-06-18

## 2021-07-06 ENCOUNTER — TELEPHONE (OUTPATIENT)
Dept: HEMATOLOGY/ONCOLOGY | Facility: CLINIC | Age: 81
End: 2021-07-06

## 2021-07-06 ENCOUNTER — HOSPITAL ENCOUNTER (OUTPATIENT)
Dept: RADIOLOGY | Facility: HOSPITAL | Age: 81
Discharge: HOME | End: 2021-07-06
Attending: INTERNAL MEDICINE
Payer: MEDICARE

## 2021-07-06 DIAGNOSIS — E87.5 HYPERKALEMIA: ICD-10-CM

## 2021-07-06 DIAGNOSIS — E87.1 HYPONATREMIA: ICD-10-CM

## 2021-07-06 DIAGNOSIS — I10 BENIGN ESSENTIAL HTN: ICD-10-CM

## 2021-07-06 DIAGNOSIS — E04.2 MULTINODULAR GOITER: ICD-10-CM

## 2021-07-06 PROCEDURE — 76536 US SOFT TISSUE HEAD NECK THYROID: ICD-10-PCS | Mod: 26,,, | Performed by: RADIOLOGY

## 2021-07-06 PROCEDURE — 76536 US EXAM OF HEAD AND NECK: CPT | Mod: 26,,, | Performed by: RADIOLOGY

## 2021-07-06 PROCEDURE — 76536 US EXAM OF HEAD AND NECK: CPT | Mod: TC,PO

## 2021-07-12 ENCOUNTER — OFFICE VISIT (OUTPATIENT)
Dept: ENDOCRINOLOGY | Facility: CLINIC | Age: 81
End: 2021-07-12
Payer: MEDICARE

## 2021-07-12 ENCOUNTER — TELEPHONE (OUTPATIENT)
Dept: HEMATOLOGY/ONCOLOGY | Facility: CLINIC | Age: 81
End: 2021-07-12

## 2021-07-12 ENCOUNTER — PATIENT MESSAGE (OUTPATIENT)
Dept: HEMATOLOGY/ONCOLOGY | Facility: CLINIC | Age: 81
End: 2021-07-12

## 2021-07-12 VITALS
SYSTOLIC BLOOD PRESSURE: 110 MMHG | BODY MASS INDEX: 20.01 KG/M2 | WEIGHT: 101.94 LBS | DIASTOLIC BLOOD PRESSURE: 68 MMHG | HEART RATE: 80 BPM | RESPIRATION RATE: 18 BRPM | HEIGHT: 60 IN

## 2021-07-12 DIAGNOSIS — E04.2 MULTINODULAR GOITER: Primary | ICD-10-CM

## 2021-07-12 DIAGNOSIS — D50.8 OTHER IRON DEFICIENCY ANEMIA: ICD-10-CM

## 2021-07-12 DIAGNOSIS — C85.80 MARGINAL ZONE B-CELL LYMPHOMA: Primary | ICD-10-CM

## 2021-07-12 DIAGNOSIS — E87.1 HYPONATREMIA: ICD-10-CM

## 2021-07-12 DIAGNOSIS — J18.9 PNEUMONIA, UNSPECIFIED ORGANISM: ICD-10-CM

## 2021-07-12 PROCEDURE — 99999 PR PBB SHADOW E&M-EST. PATIENT-LVL V: CPT | Mod: PBBFAC,,, | Performed by: INTERNAL MEDICINE

## 2021-07-12 PROCEDURE — 99215 OFFICE O/P EST HI 40 MIN: CPT | Mod: PBBFAC,PO | Performed by: INTERNAL MEDICINE

## 2021-07-12 PROCEDURE — 99213 OFFICE O/P EST LOW 20 MIN: CPT | Mod: S$PBB,,, | Performed by: INTERNAL MEDICINE

## 2021-07-12 PROCEDURE — 99999 PR PBB SHADOW E&M-EST. PATIENT-LVL V: ICD-10-PCS | Mod: PBBFAC,,, | Performed by: INTERNAL MEDICINE

## 2021-07-12 PROCEDURE — 99213 PR OFFICE/OUTPT VISIT, EST, LEVL III, 20-29 MIN: ICD-10-PCS | Mod: S$PBB,,, | Performed by: INTERNAL MEDICINE

## 2021-07-21 ENCOUNTER — HOSPITAL ENCOUNTER (OUTPATIENT)
Dept: RADIOLOGY | Facility: HOSPITAL | Age: 81
Discharge: HOME | End: 2021-07-21
Attending: INTERNAL MEDICINE
Payer: MEDICARE

## 2021-07-21 DIAGNOSIS — J18.9 PNEUMONIA, UNSPECIFIED ORGANISM: ICD-10-CM

## 2021-07-21 DIAGNOSIS — D50.8 OTHER IRON DEFICIENCY ANEMIA: ICD-10-CM

## 2021-07-21 DIAGNOSIS — C85.80 MARGINAL ZONE B-CELL LYMPHOMA: ICD-10-CM

## 2021-07-21 PROCEDURE — 71250 CT CHEST WITHOUT CONTRAST: ICD-10-PCS | Mod: 26,,, | Performed by: RADIOLOGY

## 2021-07-21 PROCEDURE — 71250 CT THORAX DX C-: CPT | Mod: 26,,, | Performed by: RADIOLOGY

## 2021-07-21 PROCEDURE — 71250 CT THORAX DX C-: CPT | Mod: TC,PO

## 2021-07-22 ENCOUNTER — PATIENT MESSAGE (OUTPATIENT)
Dept: HEMATOLOGY/ONCOLOGY | Facility: CLINIC | Age: 81
End: 2021-07-22

## 2021-07-22 ENCOUNTER — OFFICE VISIT (OUTPATIENT)
Dept: HEMATOLOGY/ONCOLOGY | Facility: CLINIC | Age: 81
End: 2021-07-22
Payer: MEDICARE

## 2021-07-22 VITALS
HEIGHT: 60 IN | OXYGEN SATURATION: 96 % | WEIGHT: 99.75 LBS | DIASTOLIC BLOOD PRESSURE: 60 MMHG | BODY MASS INDEX: 19.59 KG/M2 | TEMPERATURE: 97 F | SYSTOLIC BLOOD PRESSURE: 116 MMHG | RESPIRATION RATE: 18 BRPM | HEART RATE: 80 BPM

## 2021-07-22 DIAGNOSIS — J18.9 PNEUMONIA, UNSPECIFIED ORGANISM: ICD-10-CM

## 2021-07-22 DIAGNOSIS — R63.4 UNEXPLAINED WEIGHT LOSS: ICD-10-CM

## 2021-07-22 DIAGNOSIS — D80.1 ACQUIRED HYPOGAMMAGLOBULINEMIA: ICD-10-CM

## 2021-07-22 DIAGNOSIS — C85.80 MARGINAL ZONE B-CELL LYMPHOMA: Primary | ICD-10-CM

## 2021-07-22 DIAGNOSIS — D50.8 OTHER IRON DEFICIENCY ANEMIA: ICD-10-CM

## 2021-07-22 PROCEDURE — 99214 OFFICE O/P EST MOD 30 MIN: CPT | Mod: PBBFAC,PN | Performed by: INTERNAL MEDICINE

## 2021-07-22 PROCEDURE — 99999 PR PBB SHADOW E&M-EST. PATIENT-LVL IV: ICD-10-PCS | Mod: PBBFAC,,, | Performed by: INTERNAL MEDICINE

## 2021-07-22 PROCEDURE — 99999 PR PBB SHADOW E&M-EST. PATIENT-LVL IV: CPT | Mod: PBBFAC,,, | Performed by: INTERNAL MEDICINE

## 2021-07-22 PROCEDURE — 99215 PR OFFICE/OUTPT VISIT, EST, LEVL V, 40-54 MIN: ICD-10-PCS | Mod: S$PBB,,, | Performed by: INTERNAL MEDICINE

## 2021-07-22 PROCEDURE — 99215 OFFICE O/P EST HI 40 MIN: CPT | Mod: S$PBB,,, | Performed by: INTERNAL MEDICINE

## 2021-07-28 ENCOUNTER — TELEPHONE (OUTPATIENT)
Dept: GASTROENTEROLOGY | Facility: CLINIC | Age: 81
End: 2021-07-28

## 2021-09-16 ENCOUNTER — IMMUNIZATION (OUTPATIENT)
Dept: PHARMACY | Facility: CLINIC | Age: 81
End: 2021-09-16

## 2021-09-16 DIAGNOSIS — Z23 NEED FOR VACCINATION: Primary | ICD-10-CM

## 2021-10-21 ENCOUNTER — OFFICE VISIT (OUTPATIENT)
Dept: GASTROENTEROLOGY | Facility: CLINIC | Age: 81
End: 2021-10-21
Payer: MEDICARE

## 2021-10-21 VITALS — BODY MASS INDEX: 19.83 KG/M2 | HEIGHT: 60 IN | WEIGHT: 101 LBS | RESPIRATION RATE: 18 BRPM

## 2021-10-21 DIAGNOSIS — K56.699 COLONIC STRICTURE: Primary | ICD-10-CM

## 2021-10-21 PROCEDURE — 99999 PR PBB SHADOW E&M-EST. PATIENT-LVL III: ICD-10-PCS | Mod: PBBFAC,,, | Performed by: INTERNAL MEDICINE

## 2021-10-21 PROCEDURE — 99214 PR OFFICE/OUTPT VISIT, EST, LEVL IV, 30-39 MIN: ICD-10-PCS | Mod: S$PBB,,, | Performed by: INTERNAL MEDICINE

## 2021-10-21 PROCEDURE — 99214 OFFICE O/P EST MOD 30 MIN: CPT | Mod: S$PBB,,, | Performed by: INTERNAL MEDICINE

## 2021-10-21 PROCEDURE — 99213 OFFICE O/P EST LOW 20 MIN: CPT | Mod: PBBFAC,PO | Performed by: INTERNAL MEDICINE

## 2021-10-21 PROCEDURE — 99999 PR PBB SHADOW E&M-EST. PATIENT-LVL III: CPT | Mod: PBBFAC,,, | Performed by: INTERNAL MEDICINE

## 2021-12-20 PROBLEM — F32.A ANXIETY AND DEPRESSION: Status: ACTIVE | Noted: 2021-12-20

## 2021-12-20 PROBLEM — F41.9 ANXIETY AND DEPRESSION: Status: ACTIVE | Noted: 2021-12-20

## 2021-12-20 PROBLEM — Z78.0 MENOPAUSE: Status: RESOLVED | Noted: 2019-08-28 | Resolved: 2021-12-20

## 2022-01-12 DIAGNOSIS — R91.1 SOLITARY PULMONARY NODULE: ICD-10-CM

## 2022-01-13 ENCOUNTER — LAB VISIT (OUTPATIENT)
Dept: LAB | Facility: HOSPITAL | Age: 82
End: 2022-01-13
Attending: INTERNAL MEDICINE
Payer: MEDICARE

## 2022-01-13 ENCOUNTER — TELEPHONE (OUTPATIENT)
Dept: HEMATOLOGY/ONCOLOGY | Facility: CLINIC | Age: 82
End: 2022-01-13
Payer: MEDICARE

## 2022-01-13 DIAGNOSIS — C85.80 MARGINAL ZONE B-CELL LYMPHOMA: ICD-10-CM

## 2022-01-13 LAB
25(OH)D3+25(OH)D2 SERPL-MCNC: 81 NG/ML (ref 30–96)
ALBUMIN SERPL BCP-MCNC: 4.1 G/DL (ref 3.5–5.2)
ALP SERPL-CCNC: 48 U/L (ref 55–135)
ALT SERPL W/O P-5'-P-CCNC: 14 U/L (ref 10–44)
ANION GAP SERPL CALC-SCNC: 9 MMOL/L (ref 8–16)
AST SERPL-CCNC: 18 U/L (ref 10–40)
B2 MICROGLOB SERPL-MCNC: 2.3 UG/ML (ref 0–2.5)
BASOPHILS # BLD AUTO: 0.05 K/UL (ref 0–0.2)
BASOPHILS NFR BLD: 0.8 % (ref 0–1.9)
BILIRUB SERPL-MCNC: 0.5 MG/DL (ref 0.1–1)
BUN SERPL-MCNC: 13 MG/DL (ref 8–23)
CALCIUM SERPL-MCNC: 10.2 MG/DL (ref 8.7–10.5)
CHLORIDE SERPL-SCNC: 104 MMOL/L (ref 95–110)
CO2 SERPL-SCNC: 28 MMOL/L (ref 23–29)
CREAT SERPL-MCNC: 0.9 MG/DL (ref 0.5–1.4)
DIFFERENTIAL METHOD: ABNORMAL
EOSINOPHIL # BLD AUTO: 0.1 K/UL (ref 0–0.5)
EOSINOPHIL NFR BLD: 1.7 % (ref 0–8)
ERYTHROCYTE [DISTWIDTH] IN BLOOD BY AUTOMATED COUNT: 12.9 % (ref 11.5–14.5)
EST. GFR  (AFRICAN AMERICAN): >60 ML/MIN/1.73 M^2
EST. GFR  (NON AFRICAN AMERICAN): 59 ML/MIN/1.73 M^2
GLUCOSE SERPL-MCNC: 105 MG/DL (ref 70–110)
HCT VFR BLD AUTO: 41.4 % (ref 37–48.5)
HGB BLD-MCNC: 13.5 G/DL (ref 12–16)
IMM GRANULOCYTES # BLD AUTO: 0.02 K/UL (ref 0–0.04)
IMM GRANULOCYTES NFR BLD AUTO: 0.3 % (ref 0–0.5)
LDH SERPL L TO P-CCNC: 119 U/L (ref 110–260)
LYMPHOCYTES # BLD AUTO: 0.9 K/UL (ref 1–4.8)
LYMPHOCYTES NFR BLD: 14.3 % (ref 18–48)
MCH RBC QN AUTO: 31.8 PG (ref 27–31)
MCHC RBC AUTO-ENTMCNC: 32.6 G/DL (ref 32–36)
MCV RBC AUTO: 98 FL (ref 82–98)
MONOCYTES # BLD AUTO: 0.4 K/UL (ref 0.3–1)
MONOCYTES NFR BLD: 6.5 % (ref 4–15)
NEUTROPHILS # BLD AUTO: 4.6 K/UL (ref 1.8–7.7)
NEUTROPHILS NFR BLD: 76.4 % (ref 38–73)
NRBC BLD-RTO: 0 /100 WBC
PLATELET # BLD AUTO: 288 K/UL (ref 150–450)
PMV BLD AUTO: 8.6 FL (ref 9.2–12.9)
POTASSIUM SERPL-SCNC: 4.8 MMOL/L (ref 3.5–5.1)
PROT SERPL-MCNC: 6.9 G/DL (ref 6–8.4)
RBC # BLD AUTO: 4.24 M/UL (ref 4–5.4)
SODIUM SERPL-SCNC: 141 MMOL/L (ref 136–145)
WBC # BLD AUTO: 6.01 K/UL (ref 3.9–12.7)

## 2022-01-13 PROCEDURE — 80053 COMPREHEN METABOLIC PANEL: CPT | Mod: PO | Performed by: INTERNAL MEDICINE

## 2022-01-13 PROCEDURE — 82306 VITAMIN D 25 HYDROXY: CPT | Performed by: INTERNAL MEDICINE

## 2022-01-13 PROCEDURE — 82232 ASSAY OF BETA-2 PROTEIN: CPT | Performed by: INTERNAL MEDICINE

## 2022-01-13 PROCEDURE — 85025 COMPLETE CBC W/AUTO DIFF WBC: CPT | Mod: PO | Performed by: INTERNAL MEDICINE

## 2022-01-13 PROCEDURE — 83615 LACTATE (LD) (LDH) ENZYME: CPT | Mod: PO | Performed by: INTERNAL MEDICINE

## 2022-01-13 PROCEDURE — 36415 COLL VENOUS BLD VENIPUNCTURE: CPT | Mod: PO | Performed by: INTERNAL MEDICINE

## 2022-01-13 NOTE — TELEPHONE ENCOUNTER
Called patient in regards to getting her scheduled for a CT scan before her next appt on 1/18/2022. Patient voiced understanding and is scheduled for 1/14/2022 for her CT at the [facility] out patient pavilion.

## 2022-01-18 ENCOUNTER — OFFICE VISIT (OUTPATIENT)
Dept: HEMATOLOGY/ONCOLOGY | Facility: CLINIC | Age: 82
End: 2022-01-18
Payer: MEDICARE

## 2022-01-18 ENCOUNTER — PATIENT MESSAGE (OUTPATIENT)
Dept: HEMATOLOGY/ONCOLOGY | Facility: CLINIC | Age: 82
End: 2022-01-18

## 2022-01-18 VITALS
DIASTOLIC BLOOD PRESSURE: 60 MMHG | BODY MASS INDEX: 20.09 KG/M2 | WEIGHT: 102.31 LBS | TEMPERATURE: 98 F | OXYGEN SATURATION: 98 % | RESPIRATION RATE: 18 BRPM | SYSTOLIC BLOOD PRESSURE: 90 MMHG | HEIGHT: 60 IN | HEART RATE: 65 BPM

## 2022-01-18 DIAGNOSIS — Z85.72 HISTORY OF NON-HODGKIN'S LYMPHOMA: Primary | ICD-10-CM

## 2022-01-18 DIAGNOSIS — D80.1 ACQUIRED HYPOGAMMAGLOBULINEMIA: ICD-10-CM

## 2022-01-18 DIAGNOSIS — D50.9 IRON DEFICIENCY ANEMIA, UNSPECIFIED IRON DEFICIENCY ANEMIA TYPE: ICD-10-CM

## 2022-01-18 DIAGNOSIS — R91.8 PULMONARY NODULES: ICD-10-CM

## 2022-01-18 PROCEDURE — 99999 PR PBB SHADOW E&M-EST. PATIENT-LVL IV: CPT | Mod: PBBFAC,,, | Performed by: NURSE PRACTITIONER

## 2022-01-18 PROCEDURE — 99999 PR PBB SHADOW E&M-EST. PATIENT-LVL IV: ICD-10-PCS | Mod: PBBFAC,,, | Performed by: NURSE PRACTITIONER

## 2022-01-18 PROCEDURE — 99213 PR OFFICE/OUTPT VISIT, EST, LEVL III, 20-29 MIN: ICD-10-PCS | Mod: S$PBB,,, | Performed by: NURSE PRACTITIONER

## 2022-01-18 PROCEDURE — 99213 OFFICE O/P EST LOW 20 MIN: CPT | Mod: S$PBB,,, | Performed by: NURSE PRACTITIONER

## 2022-01-18 PROCEDURE — 99214 OFFICE O/P EST MOD 30 MIN: CPT | Mod: PBBFAC,PN | Performed by: NURSE PRACTITIONER

## 2022-01-18 NOTE — PROGRESS NOTES
"History of present illness:  The patient is an 81-year-old white female known to Dr. Fuller who relocated here from [place] and established care in our clinic for surveillance post lymphoma treatment.    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  She was diagnosed in 2009, treated with Treanda/Rituxan, attained remission, and completed 2 years of maintenance therapy with Rituxan.  She has remained without evidence of disease recurrence.      10 lb weight loss & night sweats prompted a BMBX in 12/2019 which was negative as well as CT PET.   Feraheme replacement:  01/24/2020.   Patient was subsequently sent for upper and lower endoscopy.  She was found to have a stricture in her colon (06/2020).    Pulmonary nodules:  Found on imaging 05/06/2020; interval evaluation.    She presents to the clinic today for interval evaluation.  She denies any recent illnesses, fevers, drenching night sweats, painful lymphadenopathy, unexplained weight loss, rashes, pruritus, etc.  She suffers with a chronic non-productive cough she reports as "throat clearing".  No other new complaints or pertinent findings on a 14 point review of systems.    Past Medical History:   Diagnosis Date    Anemia     Cancer 2009    non-hodkins lymphoma-remission 5 yrs in 4/14    Eye hemorrhage     12/2015    Osteoporosis     Sleep apnea     uses dental mouthpiece, no cpap/bipap     Past Surgical History:   Procedure Laterality Date    BONE MARROW ASPIRATION Bilateral 12/2/2019    Procedure: ASPIRATION, BONE MARROW;  Surgeon: Guevara Fuller MD;  Location: [facility] OR;  Service: Oncology;  Laterality: Bilateral;    COLONOSCOPY  ~2004    normal findings per patient report    colonoscopy  01/02/2020        COLONOSCOPY N/A 1/2/2020    Procedure: COLONOSCOPY;  Surgeon: Zander Casper MD;  Location: [facility] ENDO;  Service: Endoscopy;  Laterality: N/A;    COLONOSCOPY N/A " 6/26/2020    Procedure: COLONOSCOPY;  Surgeon: Lenin Moreno MD;  Location: [facility];  Service: Endoscopy;  Laterality: N/A;    DILATION AND CURETTAGE OF UTERUS      ESOPHAGOGASTRODUODENOSCOPY  01/02/2020        ESOPHAGOGASTRODUODENOSCOPY N/A 1/2/2020    Procedure: EGD (ESOPHAGOGASTRODUODENOSCOPY);  Surgeon: Zander Casper MD;  Location: [facility] ENDO;  Service: Endoscopy;  Laterality: N/A;    TONSILLECTOMY       Current Outpatient Medications on File Prior to Visit   Medication Sig Dispense Refill    calcium 500 mg Tab Take 1,000 mg by mouth. Takes 2 tablets daily (1000mg)      cetirizine (ZYRTEC) 10 MG tablet Take 10 mg by mouth once daily.      coQ10, ubiquinol, 100 mg Cap Take by mouth.      eszopiclone (LUNESTA) 3 mg Tab Take 3 mg by mouth once daily.      FLUoxetine 10 MG Tab TAKE 1 TABLET BY MOUTH AT NOON 90 tablet 4    fluticasone propionate (FLONASE) 50 mcg/actuation nasal spray USE 1 SPRAY IN EACH NOSTRIL ONCE DAILY AS NEEDED 48 mL 1    glucosamine sulfate 500 mg Tab Take by mouth.      HYDROcodone-acetaminophen (NORCO)  mg per tablet       mecobalamin-levomefolate calc (EB-P1 DR) 0.4 mg- 15 mg CpDR Take 1 capsule by mouth once daily. 90 capsule 4    metoprolol succinate (TOPROL-XL) 25 MG 24 hr tablet Take 0.5 tablets (12.5 mg total) by mouth once daily. 45 tablet 4    mv-min/folic/vit K/lut/wvuf472 (ALIVE WOMEN'S 50 PLUS, BLEND, ORAL) Take by mouth.      omega 3-dha-epa-fish oil (FISH OIL) 1,000 mg (120 mg-180 mg) Cap Take 1 capsule by mouth once daily. 90 capsule 4    polyethylene glycol (GLYCOLAX) 17 gram PwPk Take by mouth daily as needed.      traZODone (DESYREL) 100 MG tablet Take 100 mg by mouth nightly.       TURMERIC ORAL Take 30 mg by mouth once daily.      vitamin D 1000 units Tab Take 185 mg by mouth once daily.      vitamins  A,C,E-zinc-copper [id number] unit-mg-unit Cap Take by mouth. Take 1 capsule once daily       No current  facility-administered medications on file prior to visit.     Physical examination:  GENERAL:  Well-developed, thin appearing, elderly, white female in no acute distress.  Alert & oriented x 3.  VITAL SIGNS:  Weight:  Gain of 2 1/2 pounds in 6 months  Vitals:    01/18/22 1117   BP: 90/60   BP Location: Right arm   Patient Position: Sitting   BP Method: Medium (Manual)   Pulse: 65   Resp: 18   Temp: 97.5 °F (36.4 °C)   TempSrc: Oral   SpO2: 98%   Weight: 46.4 kg (102 lb 4.7 oz)   Height: 5' (1.524 m)     HEENT: Normocephalic, atraumatic. Oral mucosa pink and moist. Lips without lesions. Tongue midline. Nonicteric sclerae.   NECK: Supple, no adenopathy. No carotid bruits, thyromegaly or thyroid nodule.   HEART: Regular rate and rhythm without murmur, gallop or rub.   LUNGS: Clear to auscultation bilaterally. Normal respiratory effort.   ABDOMEN: Soft, nontender, nondistended with positive normoactive bowel sounds, no hepatosplenomegaly.   EXTREMITIES: No cyanosis, clubbing or edema. Distal pulses are intact.   AXILLAE AND GROIN: No palpable pathologic lymphadenopathy is appreciated.   SKIN: Intact/turgor normal   NEUROLOGIC: Cranial nerves II-XII grossly intact. Motor: Good muscle bulk and tone. Strength/sensory 5/5 throughout. Gait stable.     Laboratory:  Lab Results   Component Value Date    WBC 6.01 01/13/2022    RBC 4.24 01/13/2022    HGB 13.5 01/13/2022    HCT 41.4 01/13/2022    MCV 98 01/13/2022    MCH 31.8 (H) 01/13/2022    MCHC 32.6 01/13/2022    RDW 12.9 01/13/2022     01/13/2022    MPV 8.6 (L) 01/13/2022    GRAN 4.6 01/13/2022    GRAN 76.4 (H) 01/13/2022    LYMPH 0.9 (L) 01/13/2022    LYMPH 14.3 (L) 01/13/2022    MONO 0.4 01/13/2022    MONO 6.5 01/13/2022    EOS 0.1 01/13/2022    BASO 0.05 01/13/2022    EOSINOPHIL 1.7 01/13/2022    BASOPHIL 0.8 01/13/2022     CMP  Sodium   Date Value Ref Range Status   01/13/2022 141 136 - 145 mmol/L Final     Potassium   Date Value Ref Range Status   01/13/2022 4.8  3.5 - 5.1 mmol/L Final     Chloride   Date Value Ref Range Status   01/13/2022 104 95 - 110 mmol/L Final     CO2   Date Value Ref Range Status   01/13/2022 28 23 - 29 mmol/L Final     Glucose   Date Value Ref Range Status   01/13/2022 105 70 - 110 mg/dL Final     BUN   Date Value Ref Range Status   01/13/2022 13 8 - 23 mg/dL Final     Creatinine   Date Value Ref Range Status   01/13/2022 0.9 0.5 - 1.4 mg/dL Final     Calcium   Date Value Ref Range Status   01/13/2022 10.2 8.7 - 10.5 mg/dL Final     Total Protein   Date Value Ref Range Status   01/13/2022 6.9 6.0 - 8.4 g/dL Final     Albumin   Date Value Ref Range Status   01/13/2022 4.1 3.5 - 5.2 g/dL Final     Total Bilirubin   Date Value Ref Range Status   01/13/2022 0.5 0.1 - 1.0 mg/dL Final     Comment:     For infants and newborns, interpretation of results should be based  on gestational age, weight and in agreement with clinical  observations.    Premature Infant recommended reference ranges:  Up to 24 hours.............<8.0 mg/dL  Up to 48 hours............<12.0 mg/dL  3-5 days..................<15.0 mg/dL  6-29 days.................<15.0 mg/dL       Alkaline Phosphatase   Date Value Ref Range Status   01/13/2022 48 (L) 55 - 135 U/L Final     AST   Date Value Ref Range Status   01/13/2022 18 10 - 40 U/L Final     ALT   Date Value Ref Range Status   01/13/2022 14 10 - 44 U/L Final     Anion Gap   Date Value Ref Range Status   01/13/2022 9 8 - 16 mmol/L Final     eGFR if    Date Value Ref Range Status   01/13/2022 >60 >60 mL/min/1.73 m^2 Final     eGFR if non    Date Value Ref Range Status   01/13/2022 59 (A) >60 mL/min/1.73 m^2 Final     Comment:     Calculation used to obtain the estimated glomerular filtration  rate (eGFR) is the CKD-EPI equation.        LDH:  119  [id number]:  2.3  Vitamin D:  81    CT chest without contrast dated 01/14/2022: (Dr. Morales)  Impression:  1. No lobar consolidation or significant  effusion is appreciated.  2. No interval abnormal mediastinal or hilar lymph node enlargement is appreciated.  3. There are chronic granulomatous related changes similar in location overall albeit decreased in density with improved aeration.  There is some subtle fissure thickening unchanged significantly overall corresponding with the chest radiograph and CT description.  No other     significant interval change of parenchymal aeration is appreciated.    Impression:  1.  History of marginal zone B-cell non-Hodgkin's lymphoma - remains no evidence of disease..  2.  Iron deficiency anemia resolved following intravenous iron replacement - stable  3.  Unexplained weight loss -  Improving with weight gain  4.  Hypogammaglobulinemia -  without signs of recurrent infections.  5.  Pulmonary nodules - found on imaging 05/06/2020; results reviewed with Dr. Fuller.    Plan:  1.  Message to Dr. Morales to re-read CT CHEST 01/14/22 & address pulmonary nodules; phone review with patient.  2.  Continue observation (Hx of Lymphoma) and have patient return to clinic 6 months from now with interval CBC, CMP, LDH, beta 2 microglobulin, and repeat noncontrast CT of the chest for follow-up of pulmonary nodules to complete 2 year follow up.    Assessment/plan reviewed and approved by Dr. Fuller.

## 2022-05-09 PROBLEM — F51.04 PSYCHOPHYSIOLOGICAL INSOMNIA: Status: ACTIVE | Noted: 2022-05-09

## 2022-05-09 PROBLEM — R23.2 HOT FLASHES: Status: ACTIVE | Noted: 2022-05-09

## 2022-06-03 ENCOUNTER — IMMUNIZATION (OUTPATIENT)
Dept: PHARMACY | Facility: CLINIC | Age: 82
End: 2022-06-03
Payer: MEDICARE

## 2022-06-03 DIAGNOSIS — Z23 NEED FOR VACCINATION: Primary | ICD-10-CM

## 2022-06-29 ENCOUNTER — TELEPHONE (OUTPATIENT)
Dept: HEMATOLOGY/ONCOLOGY | Facility: CLINIC | Age: 82
End: 2022-06-29
Payer: MEDICARE

## 2022-07-15 ENCOUNTER — HOSPITAL ENCOUNTER (OUTPATIENT)
Dept: RADIOLOGY | Facility: HOSPITAL | Age: 82
Discharge: HOME | End: 2022-07-15
Attending: NURSE PRACTITIONER
Payer: MEDICARE

## 2022-07-15 DIAGNOSIS — R91.8 PULMONARY NODULES: ICD-10-CM

## 2022-07-15 PROCEDURE — 71250 CT THORAX DX C-: CPT | Mod: 26,,, | Performed by: RADIOLOGY

## 2022-07-15 PROCEDURE — 71250 CT CHEST WITHOUT CONTRAST: ICD-10-PCS | Mod: 26,,, | Performed by: RADIOLOGY

## 2022-07-15 PROCEDURE — 71250 CT THORAX DX C-: CPT | Mod: TC,PO

## 2022-07-20 ENCOUNTER — OFFICE VISIT (OUTPATIENT)
Dept: HEMATOLOGY/ONCOLOGY | Facility: CLINIC | Age: 82
End: 2022-07-20
Payer: MEDICARE

## 2022-07-20 VITALS
RESPIRATION RATE: 16 BRPM | WEIGHT: 93.94 LBS | TEMPERATURE: 98 F | SYSTOLIC BLOOD PRESSURE: 137 MMHG | DIASTOLIC BLOOD PRESSURE: 69 MMHG | BODY MASS INDEX: 18.44 KG/M2 | HEIGHT: 60 IN | HEART RATE: 82 BPM | OXYGEN SATURATION: 96 %

## 2022-07-20 DIAGNOSIS — Z85.72 HISTORY OF NON-HODGKIN'S LYMPHOMA: Primary | ICD-10-CM

## 2022-07-20 DIAGNOSIS — R91.8 PULMONARY NODULES: ICD-10-CM

## 2022-07-20 DIAGNOSIS — D80.1 ACQUIRED HYPOGAMMAGLOBULINEMIA: ICD-10-CM

## 2022-07-20 DIAGNOSIS — D50.9 IRON DEFICIENCY ANEMIA, UNSPECIFIED IRON DEFICIENCY ANEMIA TYPE: ICD-10-CM

## 2022-07-20 PROCEDURE — 99999 PR PBB SHADOW E&M-EST. PATIENT-LVL V: CPT | Mod: PBBFAC,,, | Performed by: INTERNAL MEDICINE

## 2022-07-20 PROCEDURE — 99214 PR OFFICE/OUTPT VISIT, EST, LEVL IV, 30-39 MIN: ICD-10-PCS | Mod: S$PBB,,, | Performed by: INTERNAL MEDICINE

## 2022-07-20 PROCEDURE — 99999 PR PBB SHADOW E&M-EST. PATIENT-LVL V: ICD-10-PCS | Mod: PBBFAC,,, | Performed by: INTERNAL MEDICINE

## 2022-07-20 PROCEDURE — 99215 OFFICE O/P EST HI 40 MIN: CPT | Mod: PBBFAC,PN | Performed by: INTERNAL MEDICINE

## 2022-07-20 PROCEDURE — 99214 OFFICE O/P EST MOD 30 MIN: CPT | Mod: S$PBB,,, | Performed by: INTERNAL MEDICINE

## 2022-07-20 RX ORDER — MIRTAZAPINE 7.5 MG/1
7.5 TABLET, FILM COATED ORAL DAILY
COMMUNITY
Start: 2022-07-12 | End: 2022-09-06

## 2022-07-20 NOTE — Clinical Note
Return to clinic to see the nurse practitioner 12 months from now with interval CBC, CMP, LDH, beta 2, and vitamin-D prior to appointment.

## 2022-07-20 NOTE — PROGRESS NOTES
History of present illness:  The patient is an 81-year-old white female who relocated here from [place].  She was recently seen by Jacob De La O in order to establish care in our clinic.  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  She was diagnosed in 2009, treated with Treanda/Rituxan, attained remission, and completed 2 years of maintenance therapy with Rituxan.  She has remained without evidence of disease recurrence.  At the time of her clinic appointment she had complaints of a 10 lb weight loss over the summer which she could not explain.  She was also experiencing sweats.  She denies lymphadenopathy and pruritus.  She has not experience fever.  She denies any signs or symptoms of GI bleeding. Interval CT PET was negative.  Bone marrow aspiration and biopsy were negative.  Patient was subsequently sent for upper and lower endoscopy.  She returns to clinic to discuss results.  Since endoscopy, the patient has had unrelenting cough, dyspnea exacerbated by exertion, and generalized weakness.  Patient is also experiencing ulceration on the inside of her lower lip which she finds painful in unresponsive to over-the-counter remedies.      Patient has also received intravenous iron replacement for management of iron deficiency anemia.  She also has been noted to have hypogammaglobulinemia in returns to clinic today to review surveillance lab and imaging.  Clinically, the patient is without signs or symptoms of worsening anemia.  She has had no recurrent infections since last office evaluation.  She reports having gained 2 lb.  She remains with hoarseness and nonproductive cough.  No other complaints pertinent findings on a 14 point review systems.    Patient returns to clinic for biannual surveillance examination to include laboratory and imaging for follow-up of indeterminate pulmonary nodules.  Patient is without fevers, chills, painful  lymphadenopathy, night sweats, unintentional weight loss, and opportunistic/non resolving infections.    Physical examination:  The patient is a well-developed, thin appearing, elderly, white female in no acute distress, who has a weight of 94 lb (decreased by 5.5 lb).  VITAL SIGNS: Documented  and reviewed this visit.  HEENT: Normocephalic, atraumatic. Oral mucosa pink and moist. Lips without lesions. Tongue midline.  Inner aspect of patient's lower lobe has ulcerated lesions without superimposed thrush or hemorrhage. Nonicteric sclerae.   NECK: Supple, no adenopathy. No carotid bruits, thyromegaly or thyroid nodule.   HEART: Regular rate and rhythm without murmur, gallop or rub.   LUNGS: Clear to auscultation bilaterally. Normal respiratory effort.   ABDOMEN: Soft, nontender, nondistended with positive normoactive bowel sounds, no hepatosplenomegaly.   EXTREMITIES: No cyanosis, clubbing or edema. Distal pulses are intact.   AXILLAE AND GROIN: No palpable pathologic lymphadenopathy is appreciated.   SKIN: Intact/turgor normal   NEUROLOGIC: Cranial nerves II-XII grossly intact. Motor: Good muscle bulk and tone. Strength/sensory 5/5 throughout. Gait stable.     Laboratory:  White count 8.2, hemoglobin 14.1, hematocrit 42.8, platelets 332, absolute neutrophil count 6600.  Sodium 139, potassium 4.7, chloride 103, CO2 25, BUN 13, creatinine 0.9, glucose 109, calcium 9.8, liver function tests are within normal limits, LDH is 143, GFR is 59.  Beta 2 microglobulin 1.8.    CT chest without contrast:  Study performed 07/15/2022.  - Stable appearance of the lungs with chronic findings mild bronchiectasis, mucous impaction and tree-in-bud nodularity predominantly involving the middle and lower lobes, consistent with chronic aspiration or chronic atypical non tuberculosis mycobacterial infection (such as MAC).  - Stable prominent subcarinal lymph node which given long-term stability and other findings in the chest most  consistent with a history of prior granulomatous disease.     Impression:  1.  History of marginal zone B-cell non-Hodgkin's lymphoma-remains no evidence of disease..  2.  Iron deficiency anemia resolved following intravenous iron replacement.  3.  History of Pneumonia-stable pulmonary nodules now followed x2 years.  4.  Hypogammaglobulinemia without signs of recurrent infections.    Plan:  1.  Continue observation and have patient return to clinic 12 months from now with interval CBC, CMP, LDH, beta 2 microglobulin, and vitamin-D level.  2. No additional surveillance for imaging as patient's pulmonary nodules have been stable x2 years.    This note was created using voice recognition software and may contain grammatical errors.

## 2022-09-01 ENCOUNTER — TELEPHONE (OUTPATIENT)
Dept: OBSTETRICS AND GYNECOLOGY | Facility: CLINIC | Age: 82
End: 2022-09-01
Payer: MEDICARE

## 2022-09-01 ENCOUNTER — PATIENT MESSAGE (OUTPATIENT)
Dept: HEMATOLOGY/ONCOLOGY | Facility: CLINIC | Age: 82
End: 2022-09-01
Payer: MEDICARE

## 2022-09-01 NOTE — TELEPHONE ENCOUNTER
Spoke w/ pt to inform the Dr. Quinonez is not accepting new GYN medicare patients at the moment. Appt scheduled with Dr. Robles. Pt verbalized understanding.

## 2022-09-01 NOTE — TELEPHONE ENCOUNTER
----- Message from Kade Logan sent at 9/1/2022 12:20 PM CDT -----  Contact: pt @ [phone]  Type:  Sooner Appointment Request  Caller is requesting a sooner appointment.  Caller declined first available appointment listed below.  Caller will not accept being placed on the waitlist and is requesting a message be sent to doctor.  Name of Caller:  pt  When is the first available appointment?  10/17  Symptoms:  bad hot flashes  Best Call Back Number:  [phone]  Additional Information:  please call pt to schedule

## 2022-10-13 ENCOUNTER — TELEPHONE (OUTPATIENT)
Dept: PRIMARY CARE CLINIC | Facility: CLINIC | Age: 82
End: 2022-10-13
Payer: MEDICARE

## 2022-10-13 NOTE — TELEPHONE ENCOUNTER
Pt walked into clinic, look around, answered her questions. Pt scheduled appt for Dr. Short in November

## 2022-11-29 ENCOUNTER — OFFICE VISIT (OUTPATIENT)
Dept: PRIMARY CARE CLINIC | Facility: CLINIC | Age: 82
End: 2022-11-29
Payer: MEDICARE

## 2022-11-29 VITALS
SYSTOLIC BLOOD PRESSURE: 124 MMHG | WEIGHT: 97.13 LBS | HEART RATE: 56 BPM | BODY MASS INDEX: 19.58 KG/M2 | DIASTOLIC BLOOD PRESSURE: 76 MMHG | HEIGHT: 59 IN | OXYGEN SATURATION: 99 % | TEMPERATURE: 98 F

## 2022-11-29 DIAGNOSIS — F41.9 ANXIETY AND DEPRESSION: ICD-10-CM

## 2022-11-29 DIAGNOSIS — C85.80 MARGINAL ZONE LYMPHOMA: ICD-10-CM

## 2022-11-29 DIAGNOSIS — Z76.89 ENCOUNTER TO ESTABLISH CARE: Primary | ICD-10-CM

## 2022-11-29 DIAGNOSIS — D80.1 ACQUIRED HYPOGAMMAGLOBULINEMIA: ICD-10-CM

## 2022-11-29 DIAGNOSIS — I47.10 SUPRAVENTRICULAR TACHYCARDIA: ICD-10-CM

## 2022-11-29 DIAGNOSIS — N18.31 STAGE 3A CHRONIC KIDNEY DISEASE: ICD-10-CM

## 2022-11-29 DIAGNOSIS — K21.9 GERD WITHOUT ESOPHAGITIS: ICD-10-CM

## 2022-11-29 DIAGNOSIS — F32.A ANXIETY AND DEPRESSION: ICD-10-CM

## 2022-11-29 DIAGNOSIS — E04.1 THYROID NODULE: ICD-10-CM

## 2022-11-29 DIAGNOSIS — D50.9 IRON DEFICIENCY ANEMIA, UNSPECIFIED IRON DEFICIENCY ANEMIA TYPE: ICD-10-CM

## 2022-11-29 DIAGNOSIS — I10 ESSENTIAL HYPERTENSION: ICD-10-CM

## 2022-11-29 DIAGNOSIS — R63.4 UNEXPLAINED WEIGHT LOSS: ICD-10-CM

## 2022-11-29 PROCEDURE — 99205 OFFICE O/P NEW HI 60 MIN: CPT | Mod: S$PBB,,, | Performed by: INTERNAL MEDICINE

## 2022-11-29 PROCEDURE — 99205 PR OFFICE/OUTPT VISIT, NEW, LEVL V, 60-74 MIN: ICD-10-PCS | Mod: S$PBB,,, | Performed by: INTERNAL MEDICINE

## 2022-11-29 PROCEDURE — 99999 PR PBB SHADOW E&M-EST. PATIENT-LVL V: ICD-10-PCS | Mod: PBBFAC,,, | Performed by: INTERNAL MEDICINE

## 2022-11-29 PROCEDURE — 99215 OFFICE O/P EST HI 40 MIN: CPT | Mod: PBBFAC,PN | Performed by: INTERNAL MEDICINE

## 2022-11-29 PROCEDURE — 99999 PR PBB SHADOW E&M-EST. PATIENT-LVL V: CPT | Mod: PBBFAC,,, | Performed by: INTERNAL MEDICINE

## 2022-11-29 RX ORDER — FLUOXETINE 10 MG/1
TABLET ORAL
COMMUNITY
Start: 2022-09-09 | End: 2022-12-28

## 2022-11-29 RX ORDER — PROGESTERONE 100 MG/1
CAPSULE ORAL
COMMUNITY
Start: 2022-09-07 | End: 2022-12-28

## 2022-11-29 RX ORDER — ESTRADIOL 1 MG/1
1 TABLET ORAL
COMMUNITY
Start: 2022-09-12 | End: 2023-02-23

## 2022-11-29 RX ORDER — HYDROCODONE BITARTRATE AND ACETAMINOPHEN 10; 325 MG/1; MG/1
1 TABLET ORAL 2 TIMES DAILY PRN
COMMUNITY
Start: 2022-09-12 | End: 2024-01-03

## 2022-11-29 RX ORDER — MIRTAZAPINE 7.5 MG/1
7.5 TABLET, FILM COATED ORAL
COMMUNITY
Start: 2022-10-07 | End: 2023-07-20

## 2022-11-29 RX ORDER — PROGESTERONE 100 MG/1
100 CAPSULE ORAL NIGHTLY
COMMUNITY
Start: 2022-09-06

## 2022-11-29 NOTE — PROGRESS NOTES
"[facility] INITIAL VISIT NOTE      CHIEF COMPLAINT     Chief Complaint   Patient presents with    Establish Care     Pt presents today wanting to meet with provider prior to deciding if she wants to switch care       HPI     Anabell Madrid is a 82 y.o.  female who presents with a PMHx of  ROSAURA, HTN, ANXIETY AND depression, marginal zone lymphoma, GERD with esophagitis, CKD3a, who presents today to establish care. Actually shes scheduled to "establish care", but today says she is unsure if she wants to change PCPs. Is very happy with her PCP but wanted a second opinion on certain things such as being on hormones for her hotflashes. Note, she has seen a gynecologist who reccomnended  the hormones.   Her main concerns and complaints today are: change in appetite. At least 3 years on and off. "Feels squeesy"       Lymphoma: follows with hematology/oncology Dr. Fuller. 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  She was diagnosed in 2009, treated with Treanda/Rituxan, attained remission, and completed 2 years of maintenance therapy with Rituxan.  She has remained without evidence of disease recurrence.  At the time of her clinic appointment she had complaints of a 10 lb weight loss over the summer which she could not explain.  She was also experiencing sweats.  She denies lymphadenopathy and pruritus.  She has not experience fever.  She denies any signs or symptoms of GI bleeding. Interval CT PET was negative.  Bone marrow aspiration and biopsy were negative.  Patient was subsequently sent for upper and lower endoscopy.  She returns to clinic to discuss results.  Since endoscopy, the patient has had unrelenting cough, dyspnea exacerbated by exertion, and generalized weakness.  Patient is also experiencing ulceration on the inside of her lower lip which she finds painful in unresponsive to over-the-counter remedies.  " "  Patient has also received intravenous iron replacement for management of iron deficiency anemia.  She also has been noted to have hypogammaglobulinemia in returns to clinic today to review surveillance lab and imaging.  "    Iron deficieny anemia: gets infusion of iron and her hgb has normalized. Being followed by hematology.     Depression and anxiety: currently taking Paxil  Her PHQ9 today and GAD7 . Neither was done as patient is unsure if she wants to establish.   She describes her mood as getting better. Her  of over 50 years  2 years ago. So shes lonely and this makes her sad. But shes back on her antidepressant and says it is working.   Denies any SI/HI  Describes her support system as her 6 children. And she has friends.     ROSAURA: is compliant with her machine use but was still having issues with sleep. She sees a sleep spcecialist who has prescribed her remeron, which I do agree with given her depression and weight loss. This medication likely helps with all 3. Note shes only on 7.5, so I would recommend going up on the dose to try to help a bit  more with appetite. She will dsicuss this with this doctor or Dr. Horne or whomever she decides to see.     Hotlashes and VMS: was very much affecting her life, and having trouble with sleep and functioning. Her PCP initially started her on supplements like cohash etc, rightfully, then tried paxil, with no benefit so then referred her to gyn who suggest oral progesterone and estrogen. She naturally has concerns about being on hormones, so we discussed the risk vs QOL issues of untreated VMS.           CHRONIC HEALTH ISSUES:   Past Medical History:  Past Medical History:   Diagnosis Date    Allergy     Anxiety     Chronic pain     Depression     Hypertension     Sleep apnea     uses dental mouthpiece, no cpap/bipap         Geriatric Assessment    ADLs : independent  iADLs: independent    Polypharmacy:yes. Reviewed all medications. Discussed concerns with " the chronic use of opiates.     Visual impairments: vision is fine.     Hearing impairment: wears hearing aids.     Urinary incontinence: no     Malnutrition: no but with some difficulty keeping her weight on.     Gait/balance/falls: no falls, stubles, gait is fine     Depression: PHQ2. Not completed today    Sleep:     Cognitive Problems: minicog.not tested today. Shes not sure she wants to change provders. Have noticed slight issues with memory.     Environmental/social problems: lives alone.   2 years ago. Feels safe.     Functional status: fully functionally physically and cognitively     Support system: her 6 children.     Advanced care planning:  Advance Care Planning   does not have on file. But discussed at length today and she will fill out the paperwork and return it to clinic to be scanned in.         Worry score 2    Past Surgical History:  Past Surgical History:   Procedure Laterality Date    BONE MARROW ASPIRATION Bilateral 2019    Procedure: ASPIRATION, BONE MARROW;  Surgeon: Guevara Fuller MD;  Location: [facility] OR;  Service: Oncology;  Laterality: Bilateral;    COLONOSCOPY  ~    normal findings per patient report    colonoscopy  2020        COLONOSCOPY N/A 2020    Procedure: COLONOSCOPY;  Surgeon: Zander Casper MD;  Location: [facility];  Service: Endoscopy;  Laterality: N/A;    COLONOSCOPY N/A 2020    Procedure: COLONOSCOPY;  Surgeon: Lenin Moreno MD;  Location: [facility];  Service: Endoscopy;  Laterality: N/A;    DILATION AND CURETTAGE OF UTERUS      ESOPHAGOGASTRODUODENOSCOPY  2020        ESOPHAGOGASTRODUODENOSCOPY N/A 2020    Procedure: EGD (ESOPHAGOGASTRODUODENOSCOPY);  Surgeon: Zander Casper MD;  Location: [facility];  Service: Endoscopy;  Laterality: N/A;    TONSILLECTOMY         Allergies:  Review of patient's allergies indicates:   Allergen Reactions    Pollen extracts Other (See Comments)       Home  Medications:  Prior to Admission medications    Medication Sig Start Date End Date Taking? Authorizing Provider   calcium 500 mg Tab Take 1,000 mg by mouth. Takes 2 tablets daily (1000mg)    Historical Provider   cetirizine (ZYRTEC) 10 MG tablet Take 10 mg by mouth once daily.    Historical Provider   coQ10, ubiquinol, 100 mg Cap Take by mouth.    Historical Provider   fluticasone propionate (FLONASE) 50 mcg/actuation nasal spray USE 1 SPRAY IN EACH NOSTRIL ONCE DAILY AS NEEDED 10/17/21   Richie Horne, DO   glucosamine sulfate 500 mg Tab Take by mouth.    Historical Provider   ipratropium (ATROVENT) 42 mcg (0.06 %) nasal spray SMARTSI Spray(s) Both Nares 3 Times Daily PRN 4/10/22   Historical Provider   mecobalamin-levomefolate calc (EB-P1 DR) 0.4 mg- 15 mg CpDR Take 1 capsule by mouth once daily. 11/15/21   Richie Horne, DO   metoprolol succinate (TOPROL-XL) 25 MG 24 hr tablet Take 1 tablet (25 mg total) by mouth once daily. 22  Richie Horne, DO   mv-min/folic/vit K/lut/ppyg098 (ALIVE WOMEN'S 50 PLUS, BLEND, ORAL) Take by mouth.    Historical Provider   omega 3-dha-epa-fish oil (FISH OIL) 1,000 mg (120 mg-180 mg) Cap Take 1 capsule by mouth once daily. 19   Richie Horne DO   paroxetine (PAXIL) 20 MG tablet Take 1 tablet (20 mg total) by mouth every morning. 22  Richie Horne DO   TURMERIC ORAL Take 30 mg by mouth once daily.    Historical Provider   UNABLE TO FIND Medical Marijuana $ CBD oil 900 mg tid    Historical Provider   vitamin D 1000 units Tab Take 185 mg by mouth once daily.    Historical Provider       Family History:  Family History   Problem Relation Age of Onset    Diabetes Mother     Heart failure Mother     Cancer Mother         lymphoma non-hodkins    Heart attack Father     Diabetes Brother     Cancer Maternal Aunt         bladder cancer    Cancer Maternal Uncle     Diabetes Maternal Uncle     Diabetes Maternal Grandmother     Cancer Maternal  "Grandfather         prostate cancer    Colon cancer Neg Hx     Crohn's disease Neg Hx     Esophageal cancer Neg Hx     Stomach cancer Neg Hx     Ulcerative colitis Neg Hx        Social History:  Social History     Tobacco Use    Smoking status: Never    Smokeless tobacco: Never   Substance Use Topics    Alcohol use: Yes     Comment: one glass of wine a night, not every night    Drug use: Yes     Types: Marijuana     Comment: taking medical marijuana oil bid orally       Review of Systems:  ROS    Health Maintainence:   TDap is up to date, Influenza is not up to date   Pneumovax is up to date.   Zostavax is UTD.       Covid is UTD   Cancer Screening:  PAP: is up to date.   Mammogram: is up to date.   Colonoscopy: is up to date.   DEXA:  is not up to date. Discussed today  Screening:  Hepatitis C is up to date in pts born between 2652-2915. **    PHYSICAL EXAM     /76 (BP Location: Left arm, Patient Position: Sitting, BP Method: Medium (Manual))   Pulse (!) 56   Temp 97.6 °F (36.4 °C) (Oral)   Ht 4' 11" (1.499 m)   Wt 44 kg (97 lb 1.8 oz)   SpO2 99%   BMI 19.61 kg/m²     GEN - A+OX4, NAD   HEENT - PERRL, EOMI, OP clear. MMM.   Neck - No thyromegaly or cervical LAD. No thyroid masses felt.  CV - RRR, no m/r   Chest - CTAB, no wheezing or rhonchi  Abd - S/NT/ND/+BS.   Ext - 2+BDP and radial pulses. No LE edema.   Neuro - PERRL, EOMI, no nystagmus, eyebrow raise, facial sensation, hearing, m of mastication, smile, palatal raise, shoulder shrug, tongue protrusion symmetric and intact. 5/5 BUE and BLE strength. Sensation to light touch intact throughout. 2+ DTRs. Normal gait.   MSK - No spinal tenderness to palpation. Normal gait.   Skin - No rash.    LABS     Previous labs reviewed.      ASSESSMENT/PLAN     Anabell Madrid is a 82 y.o. female with  1. Encounter to establish care  -medications reviewed and consolidated  -reviewed PMH, medications, HCM including vaccinations.   -reviewed most recent lab " work. Reordered as needed. Discussed abnormalities with patient with time allowed for questions.   -reviewed clinic policy with patient including to arrive 15 mins before appointments, labs and results including expected turn around for results.   -outside records and consultants notes reviewed as time allowed. Updated treatment team with name of other providers.   -reviewed and confirmed patients contact information, preferred pharmacy etc.   -AVS provided after visit to summarized things discussed.   -The following assessments were completed:  Living Situation  CAGE  Depression Screening  Timed Get Up and Go  Cognitive Function Screening-Minicog   Nutrition Screening  ADL Screening    2. Anxiety and depression  PHQ not done today.   Mood has improved with antidepressant  Encouraged socializing, excercising.     3. Supraventricular tachycardia  Resolved  On BB   Asymptomatic     4. Essential hypertension  BP is very well controlled.   Continue current medication regimen     5. Acquired hypogammaglobulinemia  She follows with hematology    6. Iron deficiency anemia, unspecified iron deficiency anemia type  Resolved with iron treatment per her hematologist    7. Thyroid nodule  Reviewed     8. Marginal zone lymphoma  Continue with hematology    9. Unexplained weight loss  Will monitor. Her chart shows stable wieght since the start of the year.   She has been working on this. Possibly mood related as this started 2 years ago when her  .     10. GERD without esophagitis  Asymptomatic.     11. Stage 3a chronic kidney disease  Reviewed water intake needs.   Reviewed medications to avoid.     Osteopenia with high FRAX score  Discussed being on medication given the total FRAX score >20 and hip >3%, she will think about it and disucss with whomever her PCP is.        ACP: discussion had about ACP to include definition of ACP, HCP, CODE STATUS. Paperwork handed to patient, to review, discuss with family and return  it to clinic to be scanned into the chart.         My total time spent on this encounter was at least 60 minutes which included  the following activities: preparing to see the patient, performing a medically appropriate and/or evaluation, counseling and educating the patient and family/caregiver, ordering medications, tests, or procedures, referring and communicating with other healthcare providers, documenting clinical information in the electronic or other health record. This time is independent and non-overlapping.       RTC if she decides she would like to be a patient here.     Kathrine Short MD  Board Certified Internist/Geriatrician  [facility]      Answers submitted by the patient for this visit:  Review of Systems Questionnaire (Submitted on 11/26/2022)  activity change: No  unexpected weight change: No  rhinorrhea: Yes  trouble swallowing: No  visual disturbance: No  chest tightness: No  polyuria: No  difficulty urinating: No  menstrual problem: No  joint swelling: No  arthralgias: No  confusion: No  dysphoric mood: No

## 2022-11-29 NOTE — PATIENT INSTRUCTIONS
You will be due for tetanus next year     Based no your last BMD, you do have osteopenia with a very high FRAX score. You should technically be on medication to treat your bones as though you have ostoeporosis. The medication is called olendronate aka fosamax. I addition to the calcium 1200mg plus vitamin D 1000 IU or greater.     Due to the silght decrease in renal function: avoid all NSAIDS : alleve, ibuprofen, naproxen, advil. You can only take tylneol, the is clear by the liver and does not affect kidney function.

## 2022-12-22 ENCOUNTER — TELEPHONE (OUTPATIENT)
Dept: ENDOCRINOLOGY | Facility: CLINIC | Age: 82
End: 2022-12-22
Payer: MEDICARE

## 2022-12-22 NOTE — TELEPHONE ENCOUNTER
----- Message from Yadi Duron LPN sent at 12/21/2022  4:59 PM CST -----    ----- Message -----  From: Anitra Craig  Sent: 12/21/2022   4:39 PM CST  To: [facility] Malcolm Staff (Endo)    Type:  Sooner Apoointment Request    Caller is requesting a sooner appointment.  Caller declined first available appointment listed below.  Caller will not accept being placed on the waitlist and is requesting a message be sent to doctor.  Name of Caller:    When is the first available appointment? No available appointments     Symptoms: Follow up appointment for the month of July . Pt received a letter in the mail to schedule for appointment     Would the patient rather a call back or a response via [facility]? Call back     Best Call Back Number: [phone] (mobile)     Additional Information:

## 2022-12-22 NOTE — TELEPHONE ENCOUNTER
Attempted to contact pt. No answer. LVM advising Dr. Torrez's July schedule opens on Jan 1st (but Jan 1st a Sunday & clinic closed on Jan 2nd) so can call us on [facility] 3 to make July appt or can look online on [facility] on Jan 1st and 2nd.

## 2022-12-28 PROBLEM — E78.1 HYPERTRIGLYCERIDEMIA: Status: ACTIVE | Noted: 2022-12-28

## 2022-12-28 PROBLEM — Z00.00 ANNUAL PHYSICAL EXAM: Status: ACTIVE | Noted: 2022-12-28

## 2023-01-04 ENCOUNTER — TELEPHONE (OUTPATIENT)
Dept: ENDOCRINOLOGY | Facility: CLINIC | Age: 83
End: 2023-01-04
Payer: MEDICARE

## 2023-01-04 DIAGNOSIS — E04.2 MULTINODULAR GOITER: Primary | ICD-10-CM

## 2023-01-04 DIAGNOSIS — E87.1 HYPONATREMIA: ICD-10-CM

## 2023-01-04 NOTE — TELEPHONE ENCOUNTER
----- Message from Liz Murillo sent at 1/4/2023  2:09 PM CST -----  Regarding: appointment  Contact: patient  Type:  Sooner Appointment Request    Caller is requesting a sooner appointment.  Caller declined first available appointment listed below.  Caller will not accept being placed on the waitlist and is requesting a message be sent to doctor.    Name of Caller:  patient  When is the first available appointment?    Symptoms:  F/U 2 year with TSH, CMP, thyroid US  Best Call Back Number:  [phone] (home)   Additional Information:  Patient would like to coordinate her appointment with another appointment on 07/20/23. Patient is also needs a order and schedule for a ultrasound. She would like to have it done on 07/18/23 to coordinate with her labs. Please call patient to advise.Thanks!

## 2023-01-04 NOTE — TELEPHONE ENCOUNTER
S/w pt. Scheduled 2 year f/u appt w/ Dr. Torrez for 7/28/23 at 1:30pm ([facility] endo) with labs & thyroid US prior. Pt verbalized understanding of date/time/location of appts.

## 2023-04-03 PROBLEM — Z00.00 ANNUAL PHYSICAL EXAM: Status: RESOLVED | Noted: 2022-12-28 | Resolved: 2023-04-03

## 2023-07-18 ENCOUNTER — HOSPITAL ENCOUNTER (OUTPATIENT)
Dept: RADIOLOGY | Facility: HOSPITAL | Age: 83
Discharge: HOME | End: 2023-07-18
Attending: INTERNAL MEDICINE
Payer: MEDICARE

## 2023-07-18 DIAGNOSIS — E04.2 MULTINODULAR GOITER: ICD-10-CM

## 2023-07-18 PROCEDURE — 76536 US EXAM OF HEAD AND NECK: CPT | Mod: 26,,, | Performed by: RADIOLOGY

## 2023-07-18 PROCEDURE — 76536 US EXAM OF HEAD AND NECK: CPT | Mod: TC,PO

## 2023-07-18 PROCEDURE — 76536 US SOFT TISSUE HEAD NECK THYROID: ICD-10-PCS | Mod: 26,,, | Performed by: RADIOLOGY

## 2023-07-20 ENCOUNTER — OFFICE VISIT (OUTPATIENT)
Dept: HEMATOLOGY/ONCOLOGY | Facility: CLINIC | Age: 83
End: 2023-07-20
Payer: MEDICARE

## 2023-07-20 VITALS
TEMPERATURE: 97 F | BODY MASS INDEX: 17.5 KG/M2 | SYSTOLIC BLOOD PRESSURE: 102 MMHG | HEART RATE: 70 BPM | DIASTOLIC BLOOD PRESSURE: 59 MMHG | WEIGHT: 102.5 LBS | OXYGEN SATURATION: 96 % | HEIGHT: 64 IN | RESPIRATION RATE: 18 BRPM

## 2023-07-20 DIAGNOSIS — C85.80 MARGINAL ZONE B-CELL LYMPHOMA: Primary | ICD-10-CM

## 2023-07-20 PROCEDURE — 99215 OFFICE O/P EST HI 40 MIN: CPT | Mod: PBBFAC,PN | Performed by: NURSE PRACTITIONER

## 2023-07-20 PROCEDURE — 99213 PR OFFICE/OUTPT VISIT, EST, LEVL III, 20-29 MIN: ICD-10-PCS | Mod: S$PBB,,, | Performed by: NURSE PRACTITIONER

## 2023-07-20 PROCEDURE — 99999 PR PBB SHADOW E&M-EST. PATIENT-LVL V: CPT | Mod: PBBFAC,,, | Performed by: NURSE PRACTITIONER

## 2023-07-20 PROCEDURE — 99213 OFFICE O/P EST LOW 20 MIN: CPT | Mod: S$PBB,,, | Performed by: NURSE PRACTITIONER

## 2023-07-20 PROCEDURE — 99999 PR PBB SHADOW E&M-EST. PATIENT-LVL V: ICD-10-PCS | Mod: PBBFAC,,, | Performed by: NURSE PRACTITIONER

## 2023-07-20 RX ORDER — DARIDOREXANT 50 MG/1
50 TABLET, FILM COATED ORAL NIGHTLY
COMMUNITY
Start: 2023-03-10

## 2023-07-20 NOTE — PROGRESS NOTES
Subjective:      Name: Anabell Madrid  : 1940  MRN: [id number]    CC:  Follow up on Marginal zone lymphoma    HPI:   Anabell Madrid is a 83 y.o. female presents for evaluation of No chief complaint on file.    The patient is an 83-year-old white female known to Dr. Fuller who relocated here from [place] and established care in our clinic for surveillance post lymphoma treatment.     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  She was diagnosed in , treated with Treanda/Rituxan, attained remission, and completed 2 years of maintenance therapy with Rituxan.  She has remained without evidence of disease recurrence.       10 lb weight loss & night sweats prompted a BMBX in 2019 which was negative as well as CT PET.   Feraheme replacement:  2020.   Patient was subsequently sent for upper and lower endoscopy.  She was found to have a stricture in her colon (2020).     Pulmonary nodules:  Found on imaging 2020; interval evaluation.     Today:  23  Ms. Madrid presents to the clinic for annual evaluation.  Her complaints are neck pain & Insomnia that she is following with her PCP.  Night sweats & weight loss is controlled by hormone patch.  No fevers, drenching night sweats, unexplained weight loss, painful lymphadenopathy, rashes, pruritus, etc.    Past Medical History:   Diagnosis Date    Sleep apnea     uses dental mouthpiece, no cpap/bipap       Past Surgical History:   Procedure Laterality Date    BONE MARROW ASPIRATION Bilateral 2019    Procedure: ASPIRATION, BONE MARROW;  Surgeon: Guevara Fuller MD;  Location: [facility] OR;  Service: Oncology;  Laterality: Bilateral;    COLONOSCOPY  ~    normal findings per patient report    colonoscopy  2020        COLONOSCOPY N/A 2020    Procedure: COLONOSCOPY;  Surgeon: Zander Casper MD;  Location: [facility] ENDO;  Service: Endoscopy;   Laterality: N/A;    COLONOSCOPY N/A 6/26/2020    Procedure: COLONOSCOPY;  Surgeon: Lenin Moreno MD;  Location: [facility] ENDO;  Service: Endoscopy;  Laterality: N/A;    DILATION AND CURETTAGE OF UTERUS      ESOPHAGOGASTRODUODENOSCOPY  01/02/2020        ESOPHAGOGASTRODUODENOSCOPY N/A 1/2/2020    Procedure: EGD (ESOPHAGOGASTRODUODENOSCOPY);  Surgeon: Zander Casper MD;  Location: [facility] ENDO;  Service: Endoscopy;  Laterality: N/A;    TONSILLECTOMY         Family History   Problem Relation Age of Onset    Diabetes Mother     Heart failure Mother     Cancer Mother         lymphoma non-hodkins    Heart attack Father     Diabetes Brother     Cancer Maternal Aunt         bladder cancer    Cancer Maternal Uncle     Diabetes Maternal Uncle     Diabetes Maternal Grandmother     Cancer Maternal Grandfather         prostate cancer    Colon cancer Neg Hx     Crohn's disease Neg Hx     Esophageal cancer Neg Hx     Stomach cancer Neg Hx     Ulcerative colitis Neg Hx        Social History     Socioeconomic History    Marital status:    Tobacco Use    Smoking status: Never    Smokeless tobacco: Never   Substance and Sexual Activity    Alcohol use: Yes     Comment: one glass of wine a night, not every night    Drug use: Yes     Types: Marijuana     Comment: taking medical marijuana oil bid orally    Sexual activity: Yes       Review of patient's allergies indicates:   Allergen Reactions    Pollen extracts Other (See Comments)       Review of Systems   Eyes:  Negative for visual disturbance.   Cardiovascular:  Negative for chest pain.   Respiratory:  Negative for cough and shortness of breath.    Hematologic/Lymphatic: Negative for adenopathy.   Skin:  Negative for rash.   Musculoskeletal:  Negative for back pain.   Gastrointestinal:  Negative for abdominal pain and diarrhea.   Genitourinary:  Negative for frequency.   Neurological:  Negative for headaches.   Psychiatric/Behavioral:  The patient is not  "nervous/anxious.    All other systems reviewed and are negative.         Objective:   Weight:  Gain of 8 1/2 pounds in 1 year  Vitals:    07/20/23 1352   BP: (!) 102/59   BP Location: Left arm   Patient Position: Sitting   BP Method: Small (Automatic)   Pulse: 70   Resp: 18   Temp: 96.9 °F (36.1 °C)   TempSrc: Temporal   SpO2: 96%   Weight: 46.5 kg (102 lb 8.2 oz)   Height: 5' 4" (1.626 m)        Physical Exam  Vitals reviewed.   Constitutional:       General: She is not in acute distress.  HENT:      Head: Normocephalic.   Eyes:      Conjunctiva/sclera: Conjunctivae normal.   Cardiovascular:      Rate and Rhythm: Normal rate and regular rhythm.      Pulses: Normal pulses.   Pulmonary:      Effort: Pulmonary effort is normal. No respiratory distress.      Breath sounds: No wheezing.   Abdominal:      General: Bowel sounds are normal. There is no distension.      Palpations: Abdomen is soft.      Tenderness: There is no abdominal tenderness.   Musculoskeletal:      Cervical back: Neck supple.      Right lower leg: No edema.      Left lower leg: No edema.   Lymphadenopathy:      Cervical: No cervical adenopathy.      Upper Body:      Right upper body: No supraclavicular or axillary adenopathy.      Left upper body: No supraclavicular or axillary adenopathy.      Lower Body: No right inguinal adenopathy. No left inguinal adenopathy.   Skin:     General: Skin is warm and dry.   Neurological:      Mental Status: She is alert and oriented to person, place, and time.   Psychiatric:         Behavior: Behavior normal.         Thought Content: Thought content normal.           Current Outpatient Medications on File Prior to Visit   Medication Sig    fluticasone propionate (FLONASE) 50 mcg/actuation nasal spray USE 1 SPRAY IN EACH NOSTRIL ONCE DAILY AS NEEDED    alendronate (FOSAMAX) 70 MG tablet Take 1 tablet (70 mg total) by mouth every 7 days.    calcium 500 mg Tab Take 1,000 mg by mouth. Takes 2 tablets daily (1000mg)    " cetirizine (ZYRTEC) 10 MG tablet Take 10 mg by mouth once daily.    coQ10, ubiquinol, 100 mg Cap Take by mouth.    estradiol 0.05 mg/24 hr td ptsw (VIVELLE-DOT) 0.05 mg/24 hr Place 1 patch onto the skin twice a week.    glucosamine sulfate 500 mg Tab Take by mouth.    HYDROcodone-acetaminophen (NORCO)  mg per tablet Take 1 tablet by mouth 2 (two) times daily as needed.    ipratropium (ATROVENT) 42 mcg (0.06 %) nasal spray SMARTSI Spray(s) Both Nares 3 Times Daily PRN    mecobalamin-levomefolate calc (EB-P1 DR) 0.4 mg- 15 mg CpDR Take 1 capsule by mouth once daily.    metoprolol succinate (TOPROL-XL) 25 MG 24 hr tablet Take 1 tablet (25 mg total) by mouth once daily.    mirtazapine (REMERON) 7.5 MG Tab Take 7.5 mg by mouth.    mv-min/folic/vit K/lut/sfbn802 (ALIVE WOMEN'S 50 PLUS, BLEND, ORAL) Take by mouth.    naloxone (NARCAN) 4 mg/actuation Spry AS DIRECTED    omega 3-dha-epa-fish oil (FISH OIL) 1,000 mg (120 mg-180 mg) Cap Take 1 capsule by mouth once daily.    paroxetine (PAXIL) 20 MG tablet Take 1 tablet (20 mg total) by mouth every morning.    progesterone (PROMETRIUM) 100 MG capsule Take 100 mg by mouth every evening.    tiZANidine (ZANAFLEX) 4 MG tablet Take 4 mg by mouth daily as needed.    traZODone (DESYREL) 100 MG tablet     TURMERIC ORAL Take 30 mg by mouth once daily.    UNABLE TO FIND Medical Marijuana $ CBD oil 900 mg tid    vitamin D 1000 units Tab Take 185 mg by mouth once daily.     No current facility-administered medications on file prior to visit.       CBC:  Lab Results   Component Value Date    WBC 9.03 2023    HGB 13.3 2023    HCT 40.0 2023    MCV 97 2023     2023     CMP:  Sodium   Date Value Ref Range Status   2023 139 136 - 145 mmol/L Final     Potassium   Date Value Ref Range Status   2023 4.3 3.5 - 5.1 mmol/L Final     Chloride   Date Value Ref Range Status   2023 103 95 - 110 mmol/L Final     CO2   Date Value Ref Range  Status   07/18/2023 26 23 - 29 mmol/L Final     Glucose   Date Value Ref Range Status   07/18/2023 68 (L) 70 - 110 mg/dL Final     BUN   Date Value Ref Range Status   07/18/2023 18 8 - 23 mg/dL Final     Creatinine   Date Value Ref Range Status   07/18/2023 0.9 0.5 - 1.4 mg/dL Final     Calcium   Date Value Ref Range Status   07/18/2023 9.7 8.7 - 10.5 mg/dL Final     Total Protein   Date Value Ref Range Status   07/18/2023 6.8 6.0 - 8.4 g/dL Final     Albumin   Date Value Ref Range Status   07/18/2023 4.0 3.5 - 5.2 g/dL Final     Total Bilirubin   Date Value Ref Range Status   07/18/2023 0.4 0.1 - 1.0 mg/dL Final     Comment:     For infants and newborns, interpretation of results should be based  on gestational age, weight and in agreement with clinical  observations.    Premature Infant recommended reference ranges:  Up to 24 hours.............<8.0 mg/dL  Up to 48 hours............<12.0 mg/dL  3-5 days..................<15.0 mg/dL  6-29 days.................<15.0 mg/dL       Alkaline Phosphatase   Date Value Ref Range Status   07/18/2023 41 (L) 55 - 135 U/L Final     AST   Date Value Ref Range Status   07/18/2023 15 10 - 40 U/L Final     ALT   Date Value Ref Range Status   07/18/2023 12 10 - 44 U/L Final     Anion Gap   Date Value Ref Range Status   07/18/2023 10 8 - 16 mmol/L Final     eGFR if    Date Value Ref Range Status   07/15/2022 >60 >60 mL/min/1.73 m^2 Final     eGFR if non    Date Value Ref Range Status   07/15/2022 59 (A) >60 mL/min/1.73 m^2 Final     Comment:     Calculation used to obtain the estimated glomerular filtration  rate (eGFR) is the CKD-EPI equation.        LDH:  130  [id number]:  1.3     All pertinent labs reviewed.    Assessment:       1. Marginal zone B-cell lymphoma         Plan:     Marginal zone B-cell lymphoma     DEION; F/U in 1 year & establish with Dr. Denton with CBC, CMP, LDH, BETA2 prior.  Call for any concerns.     Route Chart for  Scheduling    Med Onc Chart Routing      Follow up with physician . Follow up & establish with Dr. Denton in 1 year with CBC, CMP, LDH, BETA2 prior   Follow up with DENNIS    Infusion scheduling note    Injection scheduling note    Labs    Imaging    Pharmacy appointment    Other referrals        26 minutes were spent in coordination of patient's care, record review and counseling.         Answers submitted by the patient for this visit:  Review of Systems Questionnaire (Submitted on 7/17/2023)  appetite change : No  unexpected weight change: No  mouth sores: No

## 2023-07-28 ENCOUNTER — OFFICE VISIT (OUTPATIENT)
Dept: ENDOCRINOLOGY | Facility: CLINIC | Age: 83
End: 2023-07-28
Payer: MEDICARE

## 2023-07-28 VITALS
HEIGHT: 60 IN | DIASTOLIC BLOOD PRESSURE: 62 MMHG | BODY MASS INDEX: 20.15 KG/M2 | HEART RATE: 63 BPM | WEIGHT: 102.63 LBS | SYSTOLIC BLOOD PRESSURE: 130 MMHG | OXYGEN SATURATION: 99 %

## 2023-07-28 DIAGNOSIS — E87.1 HYPONATREMIA: ICD-10-CM

## 2023-07-28 DIAGNOSIS — E04.2 MULTINODULAR GOITER: Primary | ICD-10-CM

## 2023-07-28 DIAGNOSIS — M85.80 OSTEOPENIA, UNSPECIFIED LOCATION: ICD-10-CM

## 2023-07-28 PROCEDURE — 99999 PR PBB SHADOW E&M-EST. PATIENT-LVL IV: CPT | Mod: PBBFAC,,, | Performed by: INTERNAL MEDICINE

## 2023-07-28 PROCEDURE — 99999 PR PBB SHADOW E&M-EST. PATIENT-LVL IV: ICD-10-PCS | Mod: PBBFAC,,, | Performed by: INTERNAL MEDICINE

## 2023-07-28 PROCEDURE — 99213 OFFICE O/P EST LOW 20 MIN: CPT | Mod: S$PBB,,, | Performed by: INTERNAL MEDICINE

## 2023-07-28 PROCEDURE — 99213 PR OFFICE/OUTPT VISIT, EST, LEVL III, 20-29 MIN: ICD-10-PCS | Mod: S$PBB,,, | Performed by: INTERNAL MEDICINE

## 2023-07-28 PROCEDURE — 99214 OFFICE O/P EST MOD 30 MIN: CPT | Mod: PBBFAC,PN | Performed by: INTERNAL MEDICINE

## 2023-07-28 NOTE — PROGRESS NOTES
CHIEF COMPLAINT: THYROID NODULE  83 y.o.  being seen as a f/u. Had FNA 3/15 on the left that was benign. No XRT to head/neck.   No Difficulty swallowing. No change in voice.  Has recently had 2 falls. Taking bisphosphonates.           PAST MEDICAL HISTORY/PAST SURGICAL HISTORY:  Reviewed in [facility]    SOCIAL HISTORY: No Tobacco. 1 glass of wine a day    FAMILY HISTORY:  No known thyroid disease or thyroid cancer. + DM. + Heart disease    MEDICATIONS/ALLERGIES: The patient's MedCard has been updated and reviewed.          PE:    GENERAL: Well developed, well nourished.  ENT: Nares patent, oropharynx clear, mucosa pink,   NECK: Supple, trachea midline, no palpable nodules  CHEST: Resp even and unlabored, CTA bilateral.  CARDIAC: RRR, S1, S2 heard, no murmurs, rubs, S3, or S4     Latest Reference Range & Units 07/18/23 09:20   TSH 0.400 - 4.000 uIU/mL 1.716     US SOFT TISSUE HEAD NECK THYROID     CLINICAL HISTORY:  Nontoxic multinodular goiter     TECHNIQUE:  Ultrasound of the thyroid and cervical lymph nodes was performed.     COMPARISON:  Thyroid ultrasound of July 6, 2021     FINDINGS:  The right thyroid is small measuring 2.2 x 0.8 x 0.9 cm for a calculated volume of 0.7 cc.  The left thyroid measures 4.1 x 1.3 x 1.7 cm for a calculated volume of 4.6 cc and a total thyroid volume of 5.3 cc.  A nodular cyst is not seen on the right.  On the left there is a 6 mm cyst and a 9 mm and 6 mm nodule in the midpole.  These are unchanged from the prior studies.     Impression:     Small thyroid especially on the right.  In the left lobe are a stable 9 mm and 6 mm nodule  and a 6 mm cyst    ASSESSMENT/PLAN:  1. Thyroid Nodules- She has had a benign FNA. No obstructive symptoms.  No XRT to head or neck.  At this point nodules have been followed for a period of time.  I do not think that she needs any repeat ultrasounds.  Can check thyroid when she has her annual physical with physical exam.  And can monitor for obstructive  symptoms.      2. Hyponatremia- Na WNL. Monitoring fluid intake.  If sodium decreases.  Can re-consult as needed    3. Osteopenia-currently on a bisphosphonate.  Patient states she is had 2 recent falls.  Discussed fall risk precautions.  Wearing heels in clinic.  Discussed wearing appropriate fitting shoes.  Discuss certain shoes can increase fall risk.        FOLLOWUP  AVS- can f/u with PCP

## 2024-01-03 PROBLEM — H34.8322 TRIBUTARY (BRANCH) RETINAL VEIN OCCLUSION, LEFT EYE, STABLE: Status: ACTIVE | Noted: 2024-01-03

## 2024-01-03 PROBLEM — I49.9 CARDIAC ARRHYTHMIA, UNSPECIFIED CARDIAC ARRHYTHMIA TYPE: Status: ACTIVE | Noted: 2024-01-03

## 2024-04-08 PROBLEM — Z00.00 ANNUAL PHYSICAL EXAM: Status: RESOLVED | Noted: 2022-12-28 | Resolved: 2024-04-08

## 2024-07-18 ENCOUNTER — LAB VISIT (OUTPATIENT)
Dept: LAB | Facility: HOSPITAL | Age: 84
End: 2024-07-18
Attending: NURSE PRACTITIONER
Payer: MEDICARE

## 2024-07-18 DIAGNOSIS — C85.80 MARGINAL ZONE B-CELL LYMPHOMA: ICD-10-CM

## 2024-07-18 LAB
ALBUMIN SERPL BCP-MCNC: 4.1 G/DL (ref 3.5–5.2)
ALP SERPL-CCNC: 47 U/L (ref 55–135)
ALT SERPL W/O P-5'-P-CCNC: 11 U/L (ref 10–44)
ANION GAP SERPL CALC-SCNC: 12 MMOL/L (ref 8–16)
AST SERPL-CCNC: 17 U/L (ref 10–40)
B2 MICROGLOB SERPL-MCNC: 2.5 UG/ML (ref 0–2.5)
BASOPHILS # BLD AUTO: 0.05 K/UL (ref 0–0.2)
BASOPHILS NFR BLD: 0.6 % (ref 0–1.9)
BILIRUB SERPL-MCNC: 0.5 MG/DL (ref 0.1–1)
BUN SERPL-MCNC: 13 MG/DL (ref 8–23)
CALCIUM SERPL-MCNC: 10.3 MG/DL (ref 8.7–10.5)
CHLORIDE SERPL-SCNC: 99 MMOL/L (ref 95–110)
CO2 SERPL-SCNC: 23 MMOL/L (ref 23–29)
CREAT SERPL-MCNC: 0.9 MG/DL (ref 0.5–1.4)
DIFFERENTIAL METHOD BLD: ABNORMAL
EOSINOPHIL # BLD AUTO: 0.1 K/UL (ref 0–0.5)
EOSINOPHIL NFR BLD: 0.9 % (ref 0–8)
ERYTHROCYTE [DISTWIDTH] IN BLOOD BY AUTOMATED COUNT: 13.2 % (ref 11.5–14.5)
EST. GFR  (NO RACE VARIABLE): >60 ML/MIN/1.73 M^2
GLUCOSE SERPL-MCNC: 96 MG/DL (ref 70–110)
HCT VFR BLD AUTO: 40.9 % (ref 37–48.5)
HGB BLD-MCNC: 13.8 G/DL (ref 12–16)
IMM GRANULOCYTES # BLD AUTO: 0.05 K/UL (ref 0–0.04)
IMM GRANULOCYTES NFR BLD AUTO: 0.6 % (ref 0–0.5)
LDH SERPL L TO P-CCNC: 129 U/L (ref 110–260)
LYMPHOCYTES # BLD AUTO: 1.2 K/UL (ref 1–4.8)
LYMPHOCYTES NFR BLD: 14.5 % (ref 18–48)
MCH RBC QN AUTO: 33.3 PG (ref 27–31)
MCHC RBC AUTO-ENTMCNC: 33.7 G/DL (ref 32–36)
MCV RBC AUTO: 99 FL (ref 82–98)
MONOCYTES # BLD AUTO: 0.8 K/UL (ref 0.3–1)
MONOCYTES NFR BLD: 9.6 % (ref 4–15)
NEUTROPHILS # BLD AUTO: 6 K/UL (ref 1.8–7.7)
NEUTROPHILS NFR BLD: 73.8 % (ref 38–73)
NRBC BLD-RTO: 0 /100 WBC
PLATELET # BLD AUTO: 308 K/UL (ref 150–450)
PMV BLD AUTO: 8.8 FL (ref 9.2–12.9)
POTASSIUM SERPL-SCNC: 4.9 MMOL/L (ref 3.5–5.1)
PROT SERPL-MCNC: 7 G/DL (ref 6–8.4)
RBC # BLD AUTO: 4.15 M/UL (ref 4–5.4)
SODIUM SERPL-SCNC: 134 MMOL/L (ref 136–145)
WBC # BLD AUTO: 8.11 K/UL (ref 3.9–12.7)

## 2024-07-18 PROCEDURE — 83615 LACTATE (LD) (LDH) ENZYME: CPT | Mod: PO | Performed by: NURSE PRACTITIONER

## 2024-07-18 PROCEDURE — 82232 ASSAY OF BETA-2 PROTEIN: CPT | Performed by: NURSE PRACTITIONER

## 2024-07-18 PROCEDURE — 85025 COMPLETE CBC W/AUTO DIFF WBC: CPT | Mod: PO | Performed by: NURSE PRACTITIONER

## 2024-07-18 PROCEDURE — 36415 COLL VENOUS BLD VENIPUNCTURE: CPT | Mod: PO | Performed by: NURSE PRACTITIONER

## 2024-07-18 PROCEDURE — 80053 COMPREHEN METABOLIC PANEL: CPT | Mod: PO | Performed by: NURSE PRACTITIONER

## 2024-07-19 ENCOUNTER — TELEPHONE (OUTPATIENT)
Dept: HEMATOLOGY/ONCOLOGY | Facility: CLINIC | Age: 84
End: 2024-07-19
Payer: MEDICARE

## 2024-07-19 NOTE — TELEPHONE ENCOUNTER
Patient is leaving to go out of town on 7/30. She would like to reschedule her appointment prior to going out of town and has requested to see Jacob De La O NP. Appointment rescheduled for 7/26 at 10:30 am    ----- Message from Tabatha Koo sent at 7/19/2024  9:06 AM CDT -----  Type: Needs Medical Advice  Who Called:  Patient   Symptoms (please be specific):    How long has patient had these symptoms:    Pharmacy name and phone #:    Best Call Back Number: [phone]  Additional Information: Patient is requesting a call back for a sooner appt..

## 2024-07-26 ENCOUNTER — OFFICE VISIT (OUTPATIENT)
Dept: HEMATOLOGY/ONCOLOGY | Facility: CLINIC | Age: 84
End: 2024-07-26
Payer: MEDICARE

## 2024-07-26 VITALS
OXYGEN SATURATION: 97 % | TEMPERATURE: 97 F | SYSTOLIC BLOOD PRESSURE: 138 MMHG | RESPIRATION RATE: 18 BRPM | DIASTOLIC BLOOD PRESSURE: 73 MMHG | BODY MASS INDEX: 17.39 KG/M2 | WEIGHT: 101.88 LBS | HEART RATE: 77 BPM | HEIGHT: 64 IN

## 2024-07-26 DIAGNOSIS — Z85.72 HISTORY OF NON-HODGKIN'S LYMPHOMA: Primary | ICD-10-CM

## 2024-07-26 PROCEDURE — 99215 OFFICE O/P EST HI 40 MIN: CPT | Mod: PBBFAC,PN | Performed by: NURSE PRACTITIONER

## 2024-07-26 PROCEDURE — 99999 PR PBB SHADOW E&M-EST. PATIENT-LVL V: CPT | Mod: PBBFAC,,, | Performed by: NURSE PRACTITIONER

## 2024-07-26 NOTE — PROGRESS NOTES
"Subjective:      Name: Anabell Madrid  : 1940  MRN: [id number]    CC:  Follow up on Marginal zone lymphoma    HPI:   Anabell Madrid is a 84 y.o. female presents for evaluation of a diagnosis of Hx of Marginal zone lymphoma.  Since her last visit, her hot flashes have increased & her GYN recently increased her Estradiol patch which has not decreased hot flashes or "dampening of her clothes" at night.  The patient is also on Progesterone.  She denies any unexplained weight loss (has always watched her weight); new lumps or bumps or painful lymphadenopathy, rashes, pruritus, recent or recurring infections or fevers.      Past History:  The patient is an 84-year-old white female known to Dr. Fuller who relocated here from [place] and established care in our clinic for surveillance post lymphoma treatment.     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  She was diagnosed in , treated with Treanda/Rituxan, attained remission, and completed 2 years of maintenance therapy with Rituxan.  She has remained without evidence of disease recurrence.       10 lb weight loss & night sweats prompted a BMBX in 2019 which was negative as well as CT PET.   Feraheme replacement:  2020.   Patient was subsequently sent for upper and lower endoscopy.  She was found to have a stricture in her colon (2020).     Pulmonary nodules:  Found on imaging 2020; interval evaluation.         Past Medical History:   Diagnosis Date    Sleep apnea     uses dental mouthpiece, no cpap/bipap       Past Surgical History:   Procedure Laterality Date    BONE MARROW ASPIRATION Bilateral 2019    Procedure: ASPIRATION, BONE MARROW;  Surgeon: Guevara Fuller MD;  Location: [facility] OR;  Service: Oncology;  Laterality: Bilateral;    COLONOSCOPY  ~    normal findings per patient report    colonoscopy  2020        COLONOSCOPY N/A " 1/2/2020    Procedure: COLONOSCOPY;  Surgeon: Zander Casper MD;  Location: [facility] ENDO;  Service: Endoscopy;  Laterality: N/A;    COLONOSCOPY N/A 6/26/2020    Procedure: COLONOSCOPY;  Surgeon: Lenin Moreno MD;  Location: [facility] ENDO;  Service: Endoscopy;  Laterality: N/A;    DILATION AND CURETTAGE OF UTERUS      ESOPHAGOGASTRODUODENOSCOPY  01/02/2020        ESOPHAGOGASTRODUODENOSCOPY N/A 1/2/2020    Procedure: EGD (ESOPHAGOGASTRODUODENOSCOPY);  Surgeon: Zander Casper MD;  Location: [facility] ENDO;  Service: Endoscopy;  Laterality: N/A;    TONSILLECTOMY         Family History   Problem Relation Name Age of Onset    Diabetes Mother      Heart failure Mother      Cancer Mother          lymphoma non-hodkins    Heart attack Father      Diabetes Brother      Cancer Maternal Aunt          bladder cancer    Cancer Maternal Uncle      Diabetes Maternal Uncle      Diabetes Maternal Grandmother      Cancer Maternal Grandfather          prostate cancer    Colon cancer Neg Hx      Crohn's disease Neg Hx      Esophageal cancer Neg Hx      Stomach cancer Neg Hx      Ulcerative colitis Neg Hx         Social History     Socioeconomic History    Marital status:    Tobacco Use    Smoking status: Never    Smokeless tobacco: Never   Substance and Sexual Activity    Alcohol use: Yes     Comment: one glass of wine a night, not every night    Drug use: Yes     Types: Marijuana     Comment: taking medical marijuana oil bid orally    Sexual activity: Yes     Social Determinants of Health     Financial Resource Strain: Low Risk  (7/25/2024)    Overall Financial Resource Strain (CARDIA)     Difficulty of Paying Living Expenses: Not hard at all   Food Insecurity: No Food Insecurity (7/25/2024)    Hunger Vital Sign     Worried About Running Out of Food in the Last Year: Never true     Ran Out of Food in the Last Year: Never true   Physical Activity: Sufficiently Active (7/25/2024)    Exercise Vital Sign     Days of  "Exercise per Week: 3 days     Minutes of Exercise per Session: 60 min   Stress: Stress Concern Present (7/25/2024)    [employer] - Occupational Stress Questionnaire     Feeling of Stress : To some extent   Housing Stability: Unknown (7/25/2024)    Housing Stability Vital Sign     Unable to Pay for Housing in the Last Year: No       Review of patient's allergies indicates:   Allergen Reactions    Pollen extracts Other (See Comments)       Review of Systems   Eyes:  Negative for visual disturbance.   Cardiovascular:  Negative for chest pain.   Respiratory:  Negative for cough and shortness of breath.    Hematologic/Lymphatic: Negative for adenopathy.   Skin:  Negative for rash.   Musculoskeletal:  Negative for back pain.   Gastrointestinal:  Negative for abdominal pain and diarrhea.   Genitourinary:  Negative for frequency.   Neurological:  Negative for headaches.   Psychiatric/Behavioral:  The patient is not nervous/anxious.    All other systems reviewed and are negative.           Objective:   Weight:  Stable  Vitals:    07/26/24 1031   BP: 138/73   BP Location: Left arm   Patient Position: Sitting   BP Method: Medium (Automatic)   Pulse: 77   Resp: 18   Temp: 97 °F (36.1 °C)   TempSrc: Temporal   SpO2: 97%   Weight: 46.2 kg (101 lb 13.6 oz)   Height: 5' 4" (1.626 m)          Physical Exam  Vitals reviewed.   Constitutional:       General: She is not in acute distress.  HENT:      Head: Normocephalic.   Eyes:      Conjunctiva/sclera: Conjunctivae normal.   Cardiovascular:      Rate and Rhythm: Normal rate and regular rhythm.      Pulses: Normal pulses.   Pulmonary:      Effort: Pulmonary effort is normal. No respiratory distress.      Breath sounds: No wheezing.   Abdominal:      General: Bowel sounds are normal. There is no distension.      Palpations: Abdomen is soft.      Tenderness: There is no abdominal tenderness.   Musculoskeletal:      Cervical back: Neck supple.      Right " lower leg: No edema.      Left lower leg: No edema.   Lymphadenopathy:      Cervical: No cervical adenopathy.      Upper Body:      Right upper body: No supraclavicular or axillary adenopathy.      Left upper body: No supraclavicular or axillary adenopathy.      Lower Body: No right inguinal adenopathy. No left inguinal adenopathy.   Skin:     General: Skin is warm and dry.   Neurological:      Mental Status: She is alert and oriented to person, place, and time.   Psychiatric:         Behavior: Behavior normal.         Thought Content: Thought content normal.             Current Outpatient Medications on File Prior to Visit   Medication Sig    alendronate (FOSAMAX) 70 MG tablet Take 1 tablet (70 mg total) by mouth every 7 days.    calcium 500 mg Tab Take 1,000 mg by mouth. Takes 2 tablets daily (1000mg)    cetirizine (ZYRTEC) 10 MG tablet Take 10 mg by mouth once daily.    coQ10, ubiquinol, 100 mg Cap Take by mouth.    daridorexant (QUVIVIQ) 50 mg Tab Take 50 mg by mouth every evening.    diclofenac sodium (VOLTAREN) 1 % Gel APPLY 2 GRAMS TOPICALLY 2 (TWO) TIMES DAILY AS NEEDED (LEFT HIP PAIN)    estradiol 0.05 mg/24 hr td ptsw (VIVELLE-DOT) 0.05 mg/24 hr Place 1 patch onto the skin twice a week.    fluticasone propionate (FLONASE) 50 mcg/actuation nasal spray 1 spray (50 mcg total) by Each Nostril route 2 (two) times daily as needed for Rhinitis.    glucosamine sulfate 500 mg Tab Take by mouth.    mecobalamin-levomefolate calc (EB-P1 DR) 0.4 mg- 15 mg CpDR Take 1 capsule by mouth once daily.    metoprolol succinate (TOPROL-XL) 25 MG 24 hr tablet Take 1 tablet (25 mg total) by mouth once daily.    mv-min/folic/vit K/lut/zunm451 (ALIVE WOMEN'S 50 PLUS, BLEND, ORAL) Take by mouth.    naloxone (NARCAN) 4 mg/actuation Spry AS DIRECTED    omega 3-dha-epa-fish oil (FISH OIL) 1,000 mg (120 mg-180 mg) Cap Take 1 capsule by mouth once daily.    paroxetine (PAXIL) 20 MG tablet Take 1 tablet (20 mg total) by mouth every  morning.    progesterone (PROMETRIUM) 100 MG capsule Take 100 mg by mouth every evening.    tiZANidine (ZANAFLEX) 2 MG tablet Take 2 mg by mouth.    tiZANidine (ZANAFLEX) 4 MG tablet Take 2 mg by mouth Daily.    TURMERIC ORAL Take 30 mg by mouth once daily.    UNABLE TO FIND Medical Marijuana $ CBD oil 900 mg tid    vitamin D 1000 units Tab Take 185 mg by mouth once daily.     No current facility-administered medications on file prior to visit.       CBC:  Lab Results   Component Value Date    WBC 8.11 07/18/2024    HGB 13.8 07/18/2024    HCT 40.9 07/18/2024    MCV 99 (H) 07/18/2024     07/18/2024     CMP:  Sodium   Date Value Ref Range Status   07/18/2024 134 (L) 136 - 145 mmol/L Final     Potassium   Date Value Ref Range Status   07/18/2024 4.9 3.5 - 5.1 mmol/L Final     Chloride   Date Value Ref Range Status   07/18/2024 99 95 - 110 mmol/L Final     CO2   Date Value Ref Range Status   07/18/2024 23 23 - 29 mmol/L Final     Glucose   Date Value Ref Range Status   07/18/2024 96 70 - 110 mg/dL Final     BUN   Date Value Ref Range Status   07/18/2024 13 8 - 23 mg/dL Final     Creatinine   Date Value Ref Range Status   07/18/2024 0.9 0.5 - 1.4 mg/dL Final     Calcium   Date Value Ref Range Status   07/18/2024 10.3 8.7 - 10.5 mg/dL Final     Total Protein   Date Value Ref Range Status   07/18/2024 7.0 6.0 - 8.4 g/dL Final     Albumin   Date Value Ref Range Status   07/18/2024 4.1 3.5 - 5.2 g/dL Final     Total Bilirubin   Date Value Ref Range Status   07/18/2024 0.5 0.1 - 1.0 mg/dL Final     Comment:     For infants and newborns, interpretation of results should be based  on gestational age, weight and in agreement with clinical  observations.    Premature Infant recommended reference ranges:  Up to 24 hours.............<8.0 mg/dL  Up to 48 hours............<12.0 mg/dL  3-5 days..................<15.0 mg/dL  6-29 days.................<15.0 mg/dL       Alkaline Phosphatase   Date Value Ref Range Status    07/18/2024 47 (L) 55 - 135 U/L Final     AST   Date Value Ref Range Status   07/18/2024 17 10 - 40 U/L Final     ALT   Date Value Ref Range Status   07/18/2024 11 10 - 44 U/L Final     Anion Gap   Date Value Ref Range Status   07/18/2024 12 8 - 16 mmol/L Final     eGFR if    Date Value Ref Range Status   07/15/2022 >60 >60 mL/min/1.73 m^2 Final     eGFR if non    Date Value Ref Range Status   07/15/2022 59 (A) >60 mL/min/1.73 m^2 Final     Comment:     Calculation used to obtain the estimated glomerular filtration  rate (eGFR) is the CKD-EPI equation.        LDH:  129  [id number]:  2.5     All pertinent labs reviewed.    Assessment:       1. History of non-Hodgkin's lymphoma         Plan:     History of non-Hodgkin's lymphoma    Consult Dr. Denton for any additional labs/imaging/tests & f/u with patient.      Route Chart for Scheduling  Med Onc Route Chart for Scheduling             Answers submitted by the patient for this visit:  Review of Systems Questionnaire (Submitted on 7/25/2024)  appetite change : No  unexpected weight change: No  mouth sores: No

## 2024-07-26 NOTE — Clinical Note
"Patient was due to f/u & establish with you, but going on carlos to [place].  Please read my chart - concerns that an 83 y/o lady is taking hormones (Estradiol + Progesterone through GYN) for "night sweats, insomnia & weight loss" & needed to be increased due to poor control of "night sweats".  Disease was in bone marrow prior, labs look stable, weight is stable - but should we arrange CT C/A/P or another BMBX & then have her see you?  Any additional labs?  Your thoughts"

## 2024-07-29 DIAGNOSIS — C85.80 MARGINAL ZONE B-CELL LYMPHOMA: Primary | ICD-10-CM

## 2024-07-29 NOTE — PROGRESS NOTES
Discussed increased night sweats & use of estradiol/progesterone with Dr. Denton.  Orders placed for CT PET.  F/U & establish with Dr. Denton with results.

## 2024-07-30 ENCOUNTER — TELEPHONE (OUTPATIENT)
Dept: HEMATOLOGY/ONCOLOGY | Facility: CLINIC | Age: 84
End: 2024-07-30
Payer: MEDICARE

## 2024-07-30 NOTE — TELEPHONE ENCOUNTER
Spoke with the pt and the pt stated to scheduled her with conrado after her vacation which ends on 08/07. Pt verbalized and understanding.  ----- Message from Jacob De La O NP sent at 7/29/2024  4:03 PM CDT -----  Regarding: CT PET  Orders placed for CT PET; patient aware; can schedule around her vacation not stat;   F/u & establish with results with Dr. Denton (40 min) appt.    Thank you,  Lopez

## 2024-08-15 ENCOUNTER — HOSPITAL ENCOUNTER (OUTPATIENT)
Dept: RADIOLOGY | Facility: HOSPITAL | Age: 84
Discharge: HOME | End: 2024-08-15
Attending: NURSE PRACTITIONER
Payer: MEDICARE

## 2024-08-15 DIAGNOSIS — C85.80 MARGINAL ZONE B-CELL LYMPHOMA: ICD-10-CM

## 2024-08-15 LAB — GLUCOSE SERPL-MCNC: 115 MG/DL (ref 70–110)

## 2024-08-15 PROCEDURE — A9552 F18 FDG: HCPCS | Mod: PN | Performed by: NURSE PRACTITIONER

## 2024-08-15 PROCEDURE — 78815 PET IMAGE W/CT SKULL-THIGH: CPT | Mod: TC,PN

## 2024-08-15 RX ORDER — FLUDEOXYGLUCOSE F18 500 MCI/ML
12 INJECTION INTRAVENOUS
Status: COMPLETED | OUTPATIENT
Start: 2024-08-15 | End: 2024-08-15

## 2024-08-15 RX ADMIN — FLUDEOXYGLUCOSE F-18 11.6 MILLICURIE: 500 INJECTION INTRAVENOUS at 10:08

## 2024-08-15 NOTE — PROGRESS NOTES
PET Imaging Questionnaire    Are you a Diabetic? Recent Blood Sugar level? No    Are you anemic? Bone Marrow Stimulation Meds? No    Have you had a CT Scan, if so when & where was your last one? Yes -     Have you had a PET Scan, if so when & where was your last one? Yes -     Chemotherapy or currently on Chemotherapy? Yes    Radiation therapy? No    Surgical History:   Past Surgical History:   Procedure Laterality Date    BONE MARROW ASPIRATION Bilateral 12/2/2019    Procedure: ASPIRATION, BONE MARROW;  Surgeon: Guevara Fuller MD;  Location: [facility] OR;  Service: Oncology;  Laterality: Bilateral;    COLONOSCOPY  ~2004    normal findings per patient report    colonoscopy  01/02/2020        COLONOSCOPY N/A 1/2/2020    Procedure: COLONOSCOPY;  Surgeon: Zander Casper MD;  Location: [facility];  Service: Endoscopy;  Laterality: N/A;    COLONOSCOPY N/A 6/26/2020    Procedure: COLONOSCOPY;  Surgeon: Lenin Moreno MD;  Location: [facility];  Service: Endoscopy;  Laterality: N/A;    DILATION AND CURETTAGE OF UTERUS      ESOPHAGOGASTRODUODENOSCOPY  01/02/2020        ESOPHAGOGASTRODUODENOSCOPY N/A 1/2/2020    Procedure: EGD (ESOPHAGOGASTRODUODENOSCOPY);  Surgeon: Zander Casper MD;  Location: [facility];  Service: Endoscopy;  Laterality: N/A;    TONSILLECTOMY          Have you been fasting for at least 6 hours? Yes    Is there any chance you may be pregnant or breastfeeding? No    Assay: 12.11 MCi@:955   Injection Site:lt hand     Residual: .54 mCi@: 956   Technologist: Beatriz Dewey Injected:11.6mCi

## 2024-08-22 ENCOUNTER — OFFICE VISIT (OUTPATIENT)
Dept: HEMATOLOGY/ONCOLOGY | Facility: CLINIC | Age: 84
End: 2024-08-22
Payer: MEDICARE

## 2024-08-22 ENCOUNTER — TELEPHONE (OUTPATIENT)
Dept: HEMATOLOGY/ONCOLOGY | Facility: CLINIC | Age: 84
End: 2024-08-22
Payer: MEDICARE

## 2024-08-22 VITALS
SYSTOLIC BLOOD PRESSURE: 118 MMHG | WEIGHT: 98.75 LBS | HEART RATE: 73 BPM | DIASTOLIC BLOOD PRESSURE: 62 MMHG | OXYGEN SATURATION: 96 % | BODY MASS INDEX: 16.86 KG/M2 | TEMPERATURE: 99 F | HEIGHT: 64 IN

## 2024-08-22 DIAGNOSIS — I21.4 NSTEMI (NON-ST ELEVATED MYOCARDIAL INFARCTION): ICD-10-CM

## 2024-08-22 DIAGNOSIS — D72.828 GRANULOCYTOSIS: ICD-10-CM

## 2024-08-22 DIAGNOSIS — J98.4 PULMONARY LESION: ICD-10-CM

## 2024-08-22 DIAGNOSIS — I48.0 PAROXYSMAL ATRIAL FIBRILLATION: ICD-10-CM

## 2024-08-22 DIAGNOSIS — Z85.72 HISTORY OF NON-HODGKIN'S LYMPHOMA: ICD-10-CM

## 2024-08-22 DIAGNOSIS — C85.80 MARGINAL ZONE B-CELL LYMPHOMA: Primary | ICD-10-CM

## 2024-08-22 DIAGNOSIS — D75.839 THROMBOCYTOSIS: ICD-10-CM

## 2024-08-22 PROBLEM — I24.9 ACUTE CORONARY SYNDROME WITH HIGH TROPONIN: Status: ACTIVE | Noted: 2024-08-04

## 2024-08-22 PROBLEM — M54.2 CHRONIC NECK PAIN: Chronic | Status: ACTIVE | Noted: 2024-08-05

## 2024-08-22 PROBLEM — G89.29 CHRONIC NECK PAIN: Chronic | Status: ACTIVE | Noted: 2024-08-05

## 2024-08-22 PROBLEM — J18.9 PNEUMONIA: Status: ACTIVE | Noted: 2024-08-04

## 2024-08-22 PROCEDURE — 99214 OFFICE O/P EST MOD 30 MIN: CPT | Mod: PBBFAC,PN | Performed by: STUDENT IN AN ORGANIZED HEALTH CARE EDUCATION/TRAINING PROGRAM

## 2024-08-22 PROCEDURE — 99999 PR PBB SHADOW E&M-EST. PATIENT-LVL IV: CPT | Mod: PBBFAC,,, | Performed by: STUDENT IN AN ORGANIZED HEALTH CARE EDUCATION/TRAINING PROGRAM

## 2024-08-22 NOTE — PROGRESS NOTES
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 female with history of marginal zone lymphoma who presents for follow-up. Since last visit patient had recent aspiration event, hospitalized 8/4-8/8 while visiting son in [facility]. She had NSTEMI managed medically, complicated by a fib with RVR And elevated inflammatory markers. Treated for aspiration PNA with doxy/augmentin. Patient completed surveillance PET after return from [place].     Patient's family is noticing nonproductive cough in her sleep. No fevers, unintentional weight loss. Has had longstanding hot flashes, note damp nightgown in the AM. Remains active, lives at home independent in ADLs.     Patient presents with son.  ECOG PS is 1.  History has been obtained by chart review and discussion with the patient.    ROS:   A complete 12-point review of systems was reviewed and is negative except as mentioned above.     Past Medical History:   Past Medical History:   Diagnosis Date    Sleep apnea     uses dental mouthpiece, no cpap/bipap        Past Surgical History:   Past Surgical History:   Procedure Laterality Date    BONE MARROW ASPIRATION Bilateral 12/2/2019    Procedure: ASPIRATION, BONE MARROW;  Surgeon: Guevara Fuller MD;  Location: [facility] OR;  Service: Oncology;  Laterality: Bilateral;    COLONOSCOPY  ~2004    normal findings per patient report    colonoscopy  01/02/2020        COLONOSCOPY N/A 1/2/2020    Procedure: COLONOSCOPY;  Surgeon: Zander Casper MD;  Location: [facility] ENDO;  Service: Endoscopy;  Laterality: N/A;    COLONOSCOPY N/A 6/26/2020    Procedure: COLONOSCOPY;  Surgeon: Lenin Moreno MD;  Location: [facility] ENDO;  Service: Endoscopy;  Laterality: N/A;    DILATION AND CURETTAGE OF UTERUS       ESOPHAGOGASTRODUODENOSCOPY  01/02/2020        ESOPHAGOGASTRODUODENOSCOPY N/A 1/2/2020    Procedure: EGD (ESOPHAGOGASTRODUODENOSCOPY);  Surgeon: Zander Casper MD;  Location: [facility];  Service: Endoscopy;  Laterality: N/A;    TONSILLECTOMY          Family History:   Family History   Problem Relation Name Age of Onset    Diabetes Mother      Heart failure Mother      Cancer Mother          lymphoma non-hodkins    Heart attack Father      Diabetes Brother      Cancer Maternal Aunt          bladder cancer    Cancer Maternal Uncle      Diabetes Maternal Uncle      Diabetes Maternal Grandmother      Cancer Maternal Grandfather          prostate cancer    Colon cancer Neg Hx      Crohn's disease Neg Hx      Esophageal cancer Neg Hx      Stomach cancer Neg Hx      Ulcerative colitis Neg Hx          Social History:   Social History     Tobacco Use    Smoking status: Never    Smokeless tobacco: Never   Substance Use Topics    Alcohol use: Yes     Comment: one glass of wine a night, not every night        I have reviewed and updated the patient's past medical, surgical, family and social histories.    Allergies:   Review of patient's allergies indicates:   Allergen Reactions    Pollen extracts Other (See Comments)        Medications:   Current Outpatient Medications   Medication Sig Dispense Refill    alendronate (FOSAMAX) 70 MG tablet Take 1 tablet (70 mg total) by mouth every 7 days. 12 tablet 4    aspirin (ECOTRIN) 81 MG EC tablet Take 81 mg by mouth once daily.      atorvastatin (LIPITOR) 40 MG tablet Take 40 mg by mouth nightly.      calcium 500 mg Tab Take 1,000 mg by mouth. Takes 2 tablets daily (1000mg)      coQ10, ubiquinol, 100 mg Cap Take by mouth.      daridorexant (QUVIVIQ) 50 mg Tab Take 50 mg by mouth every evening.      diclofenac sodium (VOLTAREN) 1 % Gel APPLY 2 GRAMS TOPICALLY 2 (TWO) TIMES DAILY AS NEEDED (LEFT HIP PAIN) 100 g 1    estradiol 0.05 mg/24 hr td  ptsw (VIVELLE-DOT) 0.05 mg/24 hr Place 1 patch onto the skin twice a week.      fluticasone propionate (FLONASE) 50 mcg/actuation nasal spray 1 spray (50 mcg total) by Each Nostril route 2 (two) times daily as needed for Rhinitis. 48 mL 2    glucosamine sulfate 500 mg Tab Take by mouth.      HYDROcodone-acetaminophen (NORCO)  mg per tablet Take 1 tablet by mouth every 6 (six) hours as needed for Pain. As needed.      mecobalamin-levomefolate calc (EB-P1 DR) 0.4 mg- 15 mg CpDR Take 1 capsule by mouth once daily. 90 capsule 4    metoprolol succinate (TOPROL-XL) 25 MG 24 hr tablet Take 1 tablet (25 mg total) by mouth once daily. (Patient taking differently: Take 25 mg by mouth 2 (two) times daily.) 90 tablet 4    mv-min/folic/vit K/lut/[id number] (ALIVE WOMEN'S 50 PLUS, BLEND, ORAL) Take by mouth.      omega 3-dha-epa-fish oil (FISH OIL) 1,000 mg (120 mg-180 mg) Cap Take 1 capsule by mouth once daily. 90 capsule 4    pantoprazole (PROTONIX) 40 MG tablet Take 40 mg by mouth once daily.      paroxetine (PAXIL) 20 MG tablet Take 1 tablet (20 mg total) by mouth every morning. 90 tablet 4    progesterone (PROMETRIUM) 100 MG capsule Take 100 mg by mouth every evening.      TURMERIC ORAL Take 30 mg by mouth once daily.      UNABLE TO FIND Medical Marijuana $ CBD oil 900 mg tid      vitamin D 1000 units Tab Take 185 mg by mouth once daily.      calcium-vitamin D3 (OS-NAEEM 500 + D3) 500 mg-5 mcg (200 unit) per tablet Take 1 tablet by mouth 2 (two) times daily with meals. (Patient not taking: Reported on 8/22/2024)      naloxone (NARCAN) 4 mg/actuation Spry AS DIRECTED (Patient not taking: Reported on 8/22/2024)      tiZANidine (ZANAFLEX) 2 MG tablet Take 2 mg by mouth. (Patient not taking: Reported on 8/22/2024)      tiZANidine (ZANAFLEX) 4 MG tablet Take 2 mg by mouth Daily. (Patient not taking: Reported on 8/22/2024)       No current facility-administered medications for this visit.        Physical Exam:  "  /62   Pulse 73   Temp 98.5 °F (36.9 °C) (Temporal)   Ht 5' 4" (1.626 m)   Wt 44.8 kg (98 lb 12.3 oz)   SpO2 96%   BMI 16.95 kg/m²                Physical Exam  Constitutional:       Appearance: Normal appearance.   HENT:      Head: Normocephalic and atraumatic.      Mouth/Throat:      Mouth: Mucous membranes are moist.   Eyes:      Extraocular Movements: Extraocular movements intact.      Pupils: Pupils are equal, round, and reactive to light.   Cardiovascular:      Rate and Rhythm: Normal rate and regular rhythm.      Pulses: Normal pulses.      Heart sounds: No murmur heard.  Pulmonary:      Effort: Pulmonary effort is normal. No respiratory distress.      Breath sounds: Normal breath sounds.   Abdominal:      General: Abdomen is flat. There is no distension.      Palpations: Abdomen is soft.      Tenderness: There is no abdominal tenderness.   Musculoskeletal:         General: No swelling or tenderness. Normal range of motion.      Cervical back: Normal range of motion. No rigidity.   Skin:     General: Skin is warm and dry.      Coloration: Skin is not jaundiced.   Neurological:      General: No focal deficit present.      Mental Status: She is alert and oriented to person, place, and time.   Psychiatric:         Mood and Affect: Mood normal.         Behavior: Behavior normal.         Labs:   Lab Results   Component Value Date    WBC 10.68 08/15/2024    HGB 12.4 08/15/2024    HCT 36.6 (L) 08/15/2024    MCV 96 08/15/2024     (H) 08/15/2024       Lab Results   Component Value Date     08/15/2024    K 4.3 08/15/2024     08/15/2024    CO2 23 08/15/2024    BUN 12 08/15/2024    CREATININE 0.9 08/15/2024    ALBUMIN 3.2 (L) 08/15/2024    BILITOT 0.3 08/15/2024    ALKPHOS 56 08/15/2024    AST 13 08/15/2024    ALT 11 08/15/2024       Imaging:   NM PET CT FDG Skull Base to Mid Thigh  Narrative: EXAMINATION:  NM PET CT FDG SKULL BASE TO MID THIGH    CLINICAL HISTORY:  Hematologic malignancy, " monitor;night sweats; Other specified types of non-hodgkin lymphoma, unspecified site    TECHNIQUE:  11.6 mCi F18-FDG was administered intravenously via the left hand.  Approximately 60 minutes after radiotracer distribution, PET images were acquired from the skull base to the mid thigh. Transmission images were acquired to correct for attenuation using a whole body low-dose CT scan without contrast. Glycemia at the time of injection was 115 mg/dL.    COMPARISON:  10/29/2019    FINDINGS:  Head and Neck:    Brain demonstrates normal metabolism.  However, FDG-PET has an approximate 60% sensitivity for brain metastases which are best detected by MRI with gadolinium.  Physiologic uptake is in the neck.    Chest:    New, innumerable, patchy nodular irregular opacities are present throughout both lungs, largest in the left lower lobe max SUV of 7.3 measuring up to 1.2 cm.  Right hilar uptake on image 99 has a maximum SUV of 6.6.    Abdomen and Pelvis:    Physiologic uptake is in the liver, spleen, urinary system and bowel. No enlarged or FDG avid lymph nodes are in the abdomen or pelvis. Adrenal glands are normal.    Musculoskeletal:    No FDG avid osseous lesions are present.  Impression: 1. Extensive patchy irregular nodularity in both lungs with metabolic activity is new compared to the prior study.  This is favored to be infectious or inflammatory rather than neoplastic, however close follow-up is warranted.  2. Right hilar uptake likely represents a reactive or neoplastic node.    Electronically signed by: Edy Taylor MD  Date:    08/15/2024  Time:    12:06            Assessment:       1. Marginal zone B-cell lymphoma    2. History of non-Hodgkin's lymphoma    3. Pulmonary lesion    4. Paroxysmal atrial fibrillation    5. NSTEMI (non-ST elevated myocardial infarction)    6. Granulocytosis    7. Thrombocytosis          Plan:             # Marginal Zone Lymphoma - Patient presents 9 years following definitive therapy  for non-Hodgkin's lymphoma. Patient is unsure of disease location, but per documentation there was bone marrow involvement. We discussed the low likelihood of recurrence 15 years post treatment and lack of data to support continued imaging studies.    Patient's most recent PET scan was taken within 2 weeks of significant aspiration event and hospitalization for pneumonia complicated by NSTEMI and a fib. Multifocal findings most likely related to aspiration, however left lower lobe lesion with SUV 7.3 (1.2 cm) raises concern. Requisition for CTA from [facility] and will review case at thoracic tumor board.     Patient is very active and independent in ADLs. Has longstanding nightsweats. No other B symptoms of concerns. Will return to clinic in 4w to evaluate symptoms and determine surveillance plan, will likely need repeat imaging following resolution of event.     # Pulmonary Lesion - 1.2 cm lesion in LLL seen on PET with 7.3 avidity, notes smoking exposure history. Family has recognized dry cough at night. CTA 8/2023 unremarkable for mass, will review image  - pulm scheduled 11/2024    # A Fib # NSTEMI - Follows with cardiology    # Granulocytosis # Thrombocytosis - no recent steroids, suspect due to recent events, will repeat labs in 4w     Follow up: 4w     The above information has been reviewed with the patient and all questions have been answered to their apparent satisfaction.  They understand that they can call the clinic with any questions. All diagnostic tests and imaging personally reviewed and interpreted, communication with consultants as appropriate. Visit complexity inherent to evaluation and management associated with medical care services that serve as the continuing focal point for all needed health care services and/or with medical care services that are part of ongoing care related to a patient's single, serious condition or a complex condition provided today    Maria T Denton,  MD  Hematology/Oncology  [facility]      Med Onc Chart Routing      Follow up with physician 4 weeks. camp   Follow up with DENNIS    Infusion scheduling note    Injection scheduling note    Labs   Scheduling:  Preferred lab:  Lab interval:  Lab apt prior to visit: cbc, cmp, ldh, uric acid, iron, ferritin   Imaging    Pharmacy appointment    Other referrals              Answers submitted by the patient for this visit:  Review of Systems Questionnaire (Submitted on 8/16/2024)  appetite change : No  unexpected weight change: No  mouth sores: No  visual disturbance: No  cough: Yes  shortness of breath: Yes  chest pain: No  abdominal pain: No  diarrhea: No  frequency: No  back pain: No  rash: No  headaches: No  adenopathy: No  nervous/ anxious: No

## 2024-08-22 NOTE — TELEPHONE ENCOUNTER
Signed AMRITA faxed to [facility] Medical Records department at [phone] requesting CTA radiology films mailed to [facility].

## 2024-08-27 ENCOUNTER — TUMOR BOARD CONFERENCE (OUTPATIENT)
Dept: HEMATOLOGY/ONCOLOGY | Facility: CLINIC | Age: 84
End: 2024-08-27
Payer: MEDICARE

## 2024-08-27 NOTE — PROGRESS NOTES
[facility]      THORACIC MULTIDISCIPLINARY TUMOR BOARD  PATIENT REVIEW FORM  ___________________________________________________________________    CLINIC #: [id number]  DATE: 08/27/2024    TUMOR SITE:   Cancer Type: Other (lung nodules)     Tumor Laterality: Bilateral    Cancer Staging Complete: No     Presenting Hospital / Clinic: [facility]     Virtual Tumor Board Conference: In person       PRESENTER:   Presenter: Dr. Denton     Specialties Present: Medical Oncology; Hematology; Radiation Oncology; Surgical Oncology; Pathology; Navigation; Research; Integrative Oncology; Pulmonology; Radiology       PATIENT SUMMARY:   84 y.o. female with history of marginal zone lymphoma. Since last visit patient had recent aspiration event, hospitalized 8/4-8/8 while visiting son in [facility]. She had NSTEMI managed medically, complicated by a fib with RVR And elevated inflammatory markers. Treated for aspiration PNA with doxy/augmentin. Patient completed surveillance PET after return from [place].      Patient's family is noticing nonproductive cough in her sleep. No fevers, unintentional weight loss. Has had longstanding hot flashes, note damp nightgown in the AM. Remains active, lives at home independent in ADLs.      Patient presents with son.  ECOG PS is 1.    PET 8/15/24:  FINDINGS:    Head and Neck:   Brain demonstrates normal metabolism.  However, FDG-PET has an approximate 60% sensitivity for brain metastases which are best detected by MRI with gadolinium.  Physiologic uptake is in the neck.     Chest:  New, innumerable, patchy nodular irregular opacities are present throughout both lungs, largest in the left lower lobe max SUV of 7.3 measuring up to 1.2 cm.  Right hilar uptake on image 99 has a maximum SUV of 6.6.     Abdomen and Pelvis:  Physiologic uptake is in the liver, spleen, urinary system and bowel. No enlarged or  FDG avid lymph nodes are in the abdomen or pelvis. Adrenal glands are normal.     Musculoskeletal:  No FDG avid osseous lesions are present.     Impression:     1. Extensive patchy irregular nodularity in both lungs with metabolic activity is new compared to the prior study.  This is favored to be infectious or inflammatory rather than neoplastic, however close follow-up is warranted.  2. Right hilar uptake likely represents a reactive or neoplastic node.       Background Information  Patient Status: a current patient  Reason for Consultation: Initial Presentation  Treatment to Date: None       BOARD RECOMMENDATIONS:   Recommended Plan (General)  Recommended Plan: Additional observation  Recommended Plan Note: Repeat CT Chest in 4-6 weeks.       CONSULT NEEDED:     [] Surgery    [] Hem/Onc    [] Rad/Onc    [] Dietary                 [] Social Service    [] Psychology       [] Pulmonology    Cancer Staging   No matching staging information was found for the patient.        [x] [facility] Treatment Guidelines reviewed and care planned is consistent with guidelines.         (i.e., NCCN, NCI, PD, ACO, AUA, etc.)    PRESENTATION AT CANCER CONFERENCE:   Presentation at Cancer Conference: Prospective       CLINICAL TRIAL ELIGIBILITY:   Clinical Trial Eligibility  Clinical Trial Eligibility: Not discussed         Yoly Moss

## 2024-08-28 ENCOUNTER — TELEPHONE (OUTPATIENT)
Dept: HEMATOLOGY/ONCOLOGY | Facility: CLINIC | Age: 84
End: 2024-08-28
Payer: MEDICARE

## 2024-08-28 DIAGNOSIS — J98.4 PULMONARY LESION: Primary | ICD-10-CM

## 2024-08-28 NOTE — TELEPHONE ENCOUNTER
Left message to call the office to schedule/reschedule appt. Recall number provided.  ----- Message from Maria T Denton MD sent at 8/28/2024  1:58 PM CDT -----  Regarding: TB Recs  Hi - would you be able to schedule CT Chest with ion robot protocol in 6-8 weeks and fu with me after scan? No need for labs.     Thank you!  Maria T

## 2024-09-20 ENCOUNTER — LAB VISIT (OUTPATIENT)
Dept: LAB | Facility: HOSPITAL | Age: 84
End: 2024-09-20
Attending: FAMILY MEDICINE
Payer: MEDICARE

## 2024-09-20 DIAGNOSIS — C85.80 MARGINAL ZONE B-CELL LYMPHOMA: ICD-10-CM

## 2024-09-20 DIAGNOSIS — D75.839 THROMBOCYTOSIS: ICD-10-CM

## 2024-09-20 LAB
ALBUMIN SERPL BCP-MCNC: 4.2 G/DL (ref 3.5–5.2)
ALP SERPL-CCNC: 60 U/L (ref 55–135)
ALT SERPL W/O P-5'-P-CCNC: 15 U/L (ref 10–44)
ANION GAP SERPL CALC-SCNC: 16 MMOL/L (ref 8–16)
AST SERPL-CCNC: 20 U/L (ref 10–40)
BASOPHILS # BLD AUTO: 0.09 K/UL (ref 0–0.2)
BASOPHILS NFR BLD: 0.8 % (ref 0–1.9)
BILIRUB SERPL-MCNC: 0.9 MG/DL (ref 0.1–1)
BUN SERPL-MCNC: 17 MG/DL (ref 8–23)
CALCIUM SERPL-MCNC: 10.4 MG/DL (ref 8.7–10.5)
CHLORIDE SERPL-SCNC: 102 MMOL/L (ref 95–110)
CO2 SERPL-SCNC: 22 MMOL/L (ref 23–29)
CREAT SERPL-MCNC: 1 MG/DL (ref 0.5–1.4)
DIFFERENTIAL METHOD BLD: ABNORMAL
EOSINOPHIL # BLD AUTO: 0.1 K/UL (ref 0–0.5)
EOSINOPHIL NFR BLD: 1.1 % (ref 0–8)
ERYTHROCYTE [DISTWIDTH] IN BLOOD BY AUTOMATED COUNT: 14.1 % (ref 11.5–14.5)
EST. GFR  (NO RACE VARIABLE): 54.9 ML/MIN/1.73 M^2
GLUCOSE SERPL-MCNC: 100 MG/DL (ref 70–110)
HCT VFR BLD AUTO: 44 % (ref 37–48.5)
HGB BLD-MCNC: 14.8 G/DL (ref 12–16)
IMM GRANULOCYTES # BLD AUTO: 0.06 K/UL (ref 0–0.04)
IMM GRANULOCYTES NFR BLD AUTO: 0.5 % (ref 0–0.5)
LDH SERPL L TO P-CCNC: 170 U/L (ref 110–260)
LYMPHOCYTES # BLD AUTO: 1.5 K/UL (ref 1–4.8)
LYMPHOCYTES NFR BLD: 13.5 % (ref 18–48)
MCH RBC QN AUTO: 32.9 PG (ref 27–31)
MCHC RBC AUTO-ENTMCNC: 33.6 G/DL (ref 32–36)
MCV RBC AUTO: 98 FL (ref 82–98)
MONOCYTES # BLD AUTO: 0.8 K/UL (ref 0.3–1)
MONOCYTES NFR BLD: 7.4 % (ref 4–15)
NEUTROPHILS # BLD AUTO: 8.8 K/UL (ref 1.8–7.7)
NEUTROPHILS NFR BLD: 76.7 % (ref 38–73)
NRBC BLD-RTO: 0 /100 WBC
PLATELET # BLD AUTO: 378 K/UL (ref 150–450)
PMV BLD AUTO: 9 FL (ref 9.2–12.9)
POTASSIUM SERPL-SCNC: 4.9 MMOL/L (ref 3.5–5.1)
PROT SERPL-MCNC: 7.7 G/DL (ref 6–8.4)
RBC # BLD AUTO: 4.5 M/UL (ref 4–5.4)
SODIUM SERPL-SCNC: 140 MMOL/L (ref 136–145)
URATE SERPL-MCNC: 5.1 MG/DL (ref 2.4–5.7)
WBC # BLD AUTO: 11.4 K/UL (ref 3.9–12.7)

## 2024-09-20 PROCEDURE — 36415 COLL VENOUS BLD VENIPUNCTURE: CPT | Mod: PN | Performed by: STUDENT IN AN ORGANIZED HEALTH CARE EDUCATION/TRAINING PROGRAM

## 2024-09-20 PROCEDURE — 85025 COMPLETE CBC W/AUTO DIFF WBC: CPT | Mod: PN | Performed by: STUDENT IN AN ORGANIZED HEALTH CARE EDUCATION/TRAINING PROGRAM

## 2024-09-20 PROCEDURE — 84466 ASSAY OF TRANSFERRIN: CPT | Performed by: STUDENT IN AN ORGANIZED HEALTH CARE EDUCATION/TRAINING PROGRAM

## 2024-09-20 PROCEDURE — 84550 ASSAY OF BLOOD/URIC ACID: CPT | Mod: PN | Performed by: STUDENT IN AN ORGANIZED HEALTH CARE EDUCATION/TRAINING PROGRAM

## 2024-09-20 PROCEDURE — 83615 LACTATE (LD) (LDH) ENZYME: CPT | Mod: PN | Performed by: STUDENT IN AN ORGANIZED HEALTH CARE EDUCATION/TRAINING PROGRAM

## 2024-09-20 PROCEDURE — 82728 ASSAY OF FERRITIN: CPT | Performed by: STUDENT IN AN ORGANIZED HEALTH CARE EDUCATION/TRAINING PROGRAM

## 2024-09-20 PROCEDURE — 83540 ASSAY OF IRON: CPT | Performed by: STUDENT IN AN ORGANIZED HEALTH CARE EDUCATION/TRAINING PROGRAM

## 2024-09-20 PROCEDURE — 80053 COMPREHEN METABOLIC PANEL: CPT | Mod: PN | Performed by: STUDENT IN AN ORGANIZED HEALTH CARE EDUCATION/TRAINING PROGRAM

## 2024-09-21 LAB
FERRITIN SERPL-MCNC: 36 NG/ML (ref 20–300)
IRON SERPL-MCNC: 184 UG/DL (ref 30–160)
SATURATED IRON: 45 % (ref 20–50)
TOTAL IRON BINDING CAPACITY: 411 UG/DL (ref 250–450)
TRANSFERRIN SERPL-MCNC: 278 MG/DL (ref 200–375)

## 2024-09-24 ENCOUNTER — HOSPITAL ENCOUNTER (OUTPATIENT)
Dept: RADIOLOGY | Facility: HOSPITAL | Age: 84
Discharge: HOME | End: 2024-09-24
Attending: STUDENT IN AN ORGANIZED HEALTH CARE EDUCATION/TRAINING PROGRAM
Payer: MEDICARE

## 2024-09-24 DIAGNOSIS — J98.4 PULMONARY LESION: ICD-10-CM

## 2024-09-24 PROCEDURE — 71260 CT THORAX DX C+: CPT | Mod: 26,,, | Performed by: RADIOLOGY

## 2024-09-24 PROCEDURE — 25500020 PHARM REV CODE 255: Mod: PO | Performed by: STUDENT IN AN ORGANIZED HEALTH CARE EDUCATION/TRAINING PROGRAM

## 2024-09-24 PROCEDURE — 71260 CT THORAX DX C+: CPT | Mod: TC,PO

## 2024-09-24 RX ADMIN — IOHEXOL 75 ML: 350 INJECTION, SOLUTION INTRAVENOUS at 09:09

## 2024-09-25 ENCOUNTER — OFFICE VISIT (OUTPATIENT)
Dept: HEMATOLOGY/ONCOLOGY | Facility: CLINIC | Age: 84
End: 2024-09-25
Payer: MEDICARE

## 2024-09-25 VITALS
BODY MASS INDEX: 19.27 KG/M2 | WEIGHT: 98.13 LBS | OXYGEN SATURATION: 99 % | SYSTOLIC BLOOD PRESSURE: 129 MMHG | DIASTOLIC BLOOD PRESSURE: 67 MMHG | HEIGHT: 60 IN | HEART RATE: 99 BPM

## 2024-09-25 DIAGNOSIS — Z85.72 HISTORY OF NON-HODGKIN'S LYMPHOMA: ICD-10-CM

## 2024-09-25 DIAGNOSIS — C85.80 MARGINAL ZONE B-CELL LYMPHOMA: ICD-10-CM

## 2024-09-25 DIAGNOSIS — D72.828 GRANULOCYTOSIS: ICD-10-CM

## 2024-09-25 DIAGNOSIS — I21.4 NSTEMI (NON-ST ELEVATED MYOCARDIAL INFARCTION): ICD-10-CM

## 2024-09-25 DIAGNOSIS — J98.4 PULMONARY LESION: Primary | ICD-10-CM

## 2024-09-25 DIAGNOSIS — I48.0 PAROXYSMAL ATRIAL FIBRILLATION: ICD-10-CM

## 2024-09-25 DIAGNOSIS — D75.839 THROMBOCYTOSIS: ICD-10-CM

## 2024-09-25 PROCEDURE — 99214 OFFICE O/P EST MOD 30 MIN: CPT | Mod: S$PBB,,, | Performed by: STUDENT IN AN ORGANIZED HEALTH CARE EDUCATION/TRAINING PROGRAM

## 2024-09-25 PROCEDURE — 99999 PR PBB SHADOW E&M-EST. PATIENT-LVL IV: CPT | Mod: PBBFAC,,, | Performed by: STUDENT IN AN ORGANIZED HEALTH CARE EDUCATION/TRAINING PROGRAM

## 2024-09-25 PROCEDURE — 99214 OFFICE O/P EST MOD 30 MIN: CPT | Mod: PBBFAC,PN | Performed by: STUDENT IN AN ORGANIZED HEALTH CARE EDUCATION/TRAINING PROGRAM

## 2024-09-25 PROCEDURE — G2211 COMPLEX E/M VISIT ADD ON: HCPCS | Mod: S$PBB,,, | Performed by: STUDENT IN AN ORGANIZED HEALTH CARE EDUCATION/TRAINING PROGRAM

## 2024-09-25 NOTE — PROGRESS NOTES
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 female with history of marginal zone lymphoma who presents for follow-up. Patient is dealing with hot flashes more frequently, is taking estrogen. Also with night sweats. Symptoms started >1 yr prior, no fevers, stable weight. No new swelling or lymphadenopathy. Patient reports infrequent cough.  No recent steroids    Patient presents alone.  ECOG PS is 1.  History has been obtained by chart review and discussion with the patient.    ROS:   A complete 12-point review of systems was reviewed and is negative except as mentioned above.     Past Medical History:   Past Medical History:   Diagnosis Date    Sleep apnea     uses dental mouthpiece, no cpap/bipap        Past Surgical History:   Past Surgical History:   Procedure Laterality Date    BONE MARROW ASPIRATION Bilateral 12/2/2019    Procedure: ASPIRATION, BONE MARROW;  Surgeon: Guevara Fuller MD;  Location: [facility] OR;  Service: Oncology;  Laterality: Bilateral;    COLONOSCOPY  ~2004    normal findings per patient report    colonoscopy  01/02/2020        COLONOSCOPY N/A 1/2/2020    Procedure: COLONOSCOPY;  Surgeon: Zander Casper MD;  Location: [facility];  Service: Endoscopy;  Laterality: N/A;    COLONOSCOPY N/A 6/26/2020    Procedure: COLONOSCOPY;  Surgeon: Lenin Moreno MD;  Location: [facility];  Service: Endoscopy;  Laterality: N/A;    DILATION AND CURETTAGE OF UTERUS      ESOPHAGOGASTRODUODENOSCOPY  01/02/2020        ESOPHAGOGASTRODUODENOSCOPY N/A 1/2/2020    Procedure: EGD (ESOPHAGOGASTRODUODENOSCOPY);  Surgeon: Zander Casper MD;  Location: [facility];  Service: Endoscopy;  Laterality: N/A;    TONSILLECTOMY          Family History:   Family History   Problem Relation  Name Age of Onset    Diabetes Mother      Heart failure Mother      Cancer Mother          lymphoma non-hodkins    Heart attack Father      Diabetes Brother      Cancer Maternal Aunt          bladder cancer    Cancer Maternal Uncle      Diabetes Maternal Uncle      Diabetes Maternal Grandmother      Cancer Maternal Grandfather          prostate cancer    Colon cancer Neg Hx      Crohn's disease Neg Hx      Esophageal cancer Neg Hx      Stomach cancer Neg Hx      Ulcerative colitis Neg Hx          Social History:   Social History     Tobacco Use    Smoking status: Never    Smokeless tobacco: Never   Substance Use Topics    Alcohol use: Yes     Comment: one glass of wine a night, not every night        I have reviewed and updated the patient's past medical, surgical, family and social histories.    Allergies:   Review of patient's allergies indicates:   Allergen Reactions    Pollen extracts Other (See Comments)        Medications:   Current Outpatient Medications   Medication Sig Dispense Refill    alendronate (FOSAMAX) 70 MG tablet Take 1 tablet (70 mg total) by mouth every 7 days. 12 tablet 4    apixaban (ELIQUIS) 2.5 mg Tab Take 2.5 mg by mouth 2 (two) times daily.      apixaban (ELIQUIS) 2.5 mg Tab Take 1 tablet (2.5 mg total) by mouth 2 (two) times daily. 60 tablet 0    aspirin (ECOTRIN) 81 MG EC tablet Take 81 mg by mouth once daily.      atorvastatin (LIPITOR) 40 MG tablet Take 1 tablet (40 mg total) by mouth nightly. 30 tablet 5    calcium 500 mg Tab Take 1,000 mg by mouth. Takes 2 tablets daily (1000mg)      coQ10, ubiquinol, 100 mg Cap Take by mouth.      daridorexant (QUVIVIQ) 50 mg Tab Take 50 mg by mouth every evening.      diclofenac sodium (VOLTAREN) 1 % Gel APPLY 2 GRAMS TOPICALLY 2 (TWO) TIMES DAILY AS NEEDED (LEFT HIP PAIN) 100 g 1    estradiol 0.05 mg/24 hr td ptsw (VIVELLE-DOT) 0.05 mg/24 hr Place 1 patch onto the skin twice a week.      fluticasone propionate  (FLONASE) 50 mcg/actuation nasal spray 1 spray (50 mcg total) by Each Nostril route 2 (two) times daily as needed for Rhinitis. 48 mL 2    glucosamine sulfate 500 mg Tab Take by mouth.      HYDROcodone-acetaminophen (NORCO)  mg per tablet Take 1 tablet by mouth every 6 (six) hours as needed for Pain. As needed.      mecobalamin-levomefolate calc (EB-P1 DR) 0.4 mg- 15 mg CpDR Take 1 capsule by mouth once daily. 90 capsule 4    metoprolol succinate (TOPROL-XL) 25 MG 24 hr tablet Take 1 tablet (25 mg total) by mouth 2 (two) times daily. 60 tablet 5    mv-min/folic/vit [id number] (ALIVE WOMEN'S 50 PLUS, BLEND, ORAL) Take by mouth.      naloxone (NARCAN) 4 mg/actuation Spry       omega 3-dha-epa-fish oil (FISH OIL) 1,000 mg (120 mg-180 mg) Cap Take 1 capsule by mouth once daily. 90 capsule 4    pantoprazole (PROTONIX) 40 MG tablet Take 40 mg by mouth once daily.      paroxetine (PAXIL) 20 MG tablet Take 1 tablet (20 mg total) by mouth every morning. 90 tablet 4    progesterone (PROMETRIUM) 100 MG capsule Take 100 mg by mouth every evening.      tiZANidine (ZANAFLEX) 2 MG tablet Take 2 mg by mouth.      tiZANidine (ZANAFLEX) 4 MG tablet Take 2 mg by mouth Daily.      TURMERIC ORAL Take 30 mg by mouth once daily.      UNABLE TO FIND Medical Marijuana $ CBD oil 900 mg tid      vitamin D 1000 units Tab Take 185 mg by mouth once daily.       No current facility-administered medications for this visit.        Physical Exam:   /67 (BP Location: Right arm, Patient Position: Sitting, BP Method: Medium (Automatic))   Pulse 99   Ht 5' (1.524 m)   Wt 44.5 kg (98 lb 1.7 oz)   SpO2 99%   BMI 19.16 kg/m²                Physical Exam  Constitutional:       Appearance: Normal appearance.   HENT:      Head: Normocephalic and atraumatic.      Mouth/Throat:      Mouth: Mucous membranes are moist.   Eyes:      Extraocular Movements: Extraocular movements intact.      Pupils: Pupils are equal, round, and  reactive to light.   Cardiovascular:      Rate and Rhythm: Normal rate and regular rhythm.      Pulses: Normal pulses.      Heart sounds: No murmur heard.  Pulmonary:      Effort: Pulmonary effort is normal. No respiratory distress.      Breath sounds: Normal breath sounds.   Abdominal:      General: Abdomen is flat. There is no distension.      Palpations: Abdomen is soft.      Tenderness: There is no abdominal tenderness.   Musculoskeletal:         General: No swelling or tenderness. Normal range of motion.      Cervical back: Normal range of motion. No rigidity.   Skin:     General: Skin is warm and dry.      Coloration: Skin is not jaundiced.   Neurological:      General: No focal deficit present.      Mental Status: She is alert and oriented to person, place, and time.   Psychiatric:         Mood and Affect: Mood normal.         Behavior: Behavior normal.         Labs:   Lab Results   Component Value Date    WBC 11.40 09/20/2024    HGB 14.8 09/20/2024    HCT 44.0 09/20/2024    MCV 98 09/20/2024     09/20/2024       Lab Results   Component Value Date     09/20/2024    K 4.9 09/20/2024     09/20/2024    CO2 22 (L) 09/20/2024    BUN 17 09/20/2024    CREATININE 1.0 09/20/2024    ALBUMIN 4.2 09/20/2024    BILITOT 0.9 09/20/2024    ALKPHOS 60 09/20/2024    AST 20 09/20/2024    ALT 15 09/20/2024       Imaging:   CT Chest With Contrast  Narrative: EXAMINATION:  CT CHEST WITH CONTRAST    CLINICAL HISTORY:  Lung nodule, > 8mm;Other disorders of lung    TECHNIQUE:  Low dose axial images, sagittal and coronal reformations were obtained from the thoracic inlet to the lung bases following the IV administration of 75 mL of Omnipaque 350.    COMPARISON:  PET-CT 08/15/2024; CTs dated 08/06/2024 and 07/15/2022    FINDINGS:  There is scattered reticulonodular opacities in the right lung.  A reference nodule in the right middle lobe lateral segment is 3 mm series 4, image 267.  A reference nodule in the  peripheral right lower lobe is 3 mm series 4, image 343.  There are scattered reticulonodular opacities throughout the left lung.  A reference lingular nodule is 11 mm series 4 image 292 and another reference nodule in the left lower lobe is 6 mm series 4, image 280.  No filling defects central airways.    No pleural effusion or pneumothorax.  Normal size heart.  Prior splenic granulomatous disease.  Superior endplate L1 compression fracture with associated sclerosis, which is subacute to remote in age.  Impression: Diffuse reticulonodular opacities in the lungs with some improvement relative to recent priors.  A few reference nodules are provided, some which are unchanged and some which are decreased in size.  Atypical infection is suspected.  Recommend continued imaging follow-up in light of neoplasm history.    Electronically signed by: Joe Condon  Date:    09/24/2024  Time:    12:09            Assessment:       1. Pulmonary lesion    2. Marginal zone B-cell lymphoma    3. History of non-Hodgkin's lymphoma    4. Paroxysmal atrial fibrillation    5. NSTEMI (non-ST elevated myocardial infarction)    6. Granulocytosis    7. Thrombocytosis            Plan:             # Marginal Zone Lymphoma - Patient presents 9 years following definitive therapy for non-Hodgkin's lymphoma. Patient is unsure of disease location, but per documentation there was bone marrow involvement. We discussed the low likelihood of recurrence 15 years post treatment and lack of data to support continued imaging studies.    PET scan 8/2024 was taken within 2 weeks of significant aspiration event and hospitalization for pneumonia complicated by NSTEMI and a fib. Multifocal findings most likely related to aspiration, however left lower lobe lesion with SUV 7.3 (1.2 cm) raises concern. Requisition for CTA from [facility] and tumor board review, lesions felt to be most consistent with recent PNA.    Repeat CT Chest shows stable /  improving lesions. No need for continued imaging surveillance of marginal zone lymphoma unless concerning symptoms arise.     # Pulmonary Lesion - 1.2 cm lesion in LLL seen on PET with 7.3 avidity, notes smoking exposure history. Family has recognized dry cough at night. CTA 8/2023 unremarkable for mass, repeat shows resolving lesions.   - pulm scheduled 11/2024    # A Fib # NSTEMI - Follows with cardiology    # Granulocytosis  - no recent steroids, suspect due inflammation. Fu cbc in 8w to ensure resolution of granulocytosis.     # Hot flashes - estradiol patches with gynecology, encourage fu     Follow up: 8w     The above information has been reviewed with the patient and all questions have been answered to their apparent satisfaction.  They understand that they can call the clinic with any questions. All diagnostic tests and imaging personally reviewed and interpreted, communication with consultants as appropriate. Visit complexity inherent to evaluation and management associated with medical care services that serve as the continuing focal point for all needed health care services and/or with medical care services that are part of ongoing care related to a patient's single, serious condition or a complex condition provided today    Maria T Denton MD  Hematology/Oncology  [facility]      Med Onc Chart Routing      Follow up with physician    Follow up with DENNIS 2 months. jose mejía fu cbc   Infusion scheduling note    Injection scheduling note    Labs   Scheduling:  Preferred lab:  Lab interval:  Cbc/cmp prior to visit   Imaging    Pharmacy appointment    Other referrals              Answers submitted by the patient for this visit:  Review of Systems Questionnaire (Submitted on 8/16/2024)  appetite change : No  unexpected weight change: No  mouth sores: No  visual disturbance: No  cough: Yes  shortness of breath: Yes  chest pain: No  abdominal pain: No  diarrhea: No  frequency: No  back  pain: No  rash: No  headaches: No  adenopathy: No  nervous/ anxious: No

## 2024-10-08 PROBLEM — R93.1 AGATSTON CORONARY ARTERY CALCIUM SCORE LESS THAN 100: Status: ACTIVE | Noted: 2024-10-08

## 2024-10-08 PROBLEM — I24.9 ACUTE CORONARY SYNDROME WITH HIGH TROPONIN: Status: RESOLVED | Noted: 2024-08-04 | Resolved: 2024-10-08

## 2024-10-08 PROBLEM — I49.9 CARDIAC ARRHYTHMIA, UNSPECIFIED CARDIAC ARRHYTHMIA TYPE: Status: RESOLVED | Noted: 2024-01-03 | Resolved: 2024-10-08

## 2024-10-08 PROBLEM — I47.10 SUPRAVENTRICULAR TACHYCARDIA: Status: RESOLVED | Noted: 2020-09-30 | Resolved: 2024-10-08

## 2024-11-14 ENCOUNTER — LAB VISIT (OUTPATIENT)
Dept: LAB | Facility: HOSPITAL | Age: 84
End: 2024-11-14
Attending: FAMILY MEDICINE
Payer: MEDICARE

## 2024-11-14 ENCOUNTER — OFFICE VISIT (OUTPATIENT)
Dept: HEMATOLOGY/ONCOLOGY | Facility: CLINIC | Age: 84
End: 2024-11-14
Payer: MEDICARE

## 2024-11-14 VITALS
SYSTOLIC BLOOD PRESSURE: 123 MMHG | DIASTOLIC BLOOD PRESSURE: 62 MMHG | HEART RATE: 65 BPM | TEMPERATURE: 98 F | RESPIRATION RATE: 16 BRPM | BODY MASS INDEX: 20.75 KG/M2 | OXYGEN SATURATION: 96 % | HEIGHT: 59 IN | WEIGHT: 102.94 LBS

## 2024-11-14 DIAGNOSIS — I48.0 PAROXYSMAL ATRIAL FIBRILLATION: ICD-10-CM

## 2024-11-14 DIAGNOSIS — C85.80 MARGINAL ZONE B-CELL LYMPHOMA: Primary | ICD-10-CM

## 2024-11-14 DIAGNOSIS — I21.4 NSTEMI (NON-ST ELEVATED MYOCARDIAL INFARCTION): ICD-10-CM

## 2024-11-14 DIAGNOSIS — J98.4 PULMONARY LESION: ICD-10-CM

## 2024-11-14 DIAGNOSIS — R23.2 HOT FLASHES: ICD-10-CM

## 2024-11-14 DIAGNOSIS — D72.828 GRANULOCYTOSIS: ICD-10-CM

## 2024-11-14 DIAGNOSIS — Z85.72 HISTORY OF NON-HODGKIN'S LYMPHOMA: ICD-10-CM

## 2024-11-14 LAB
ALBUMIN SERPL BCP-MCNC: 3.9 G/DL (ref 3.5–5.2)
ALP SERPL-CCNC: 47 U/L (ref 40–150)
ALT SERPL W/O P-5'-P-CCNC: 15 U/L (ref 10–44)
ANION GAP SERPL CALC-SCNC: 12 MMOL/L (ref 8–16)
AST SERPL-CCNC: 19 U/L (ref 10–40)
BASOPHILS # BLD AUTO: 0.06 K/UL (ref 0–0.2)
BASOPHILS NFR BLD: 0.8 % (ref 0–1.9)
BILIRUB SERPL-MCNC: 0.5 MG/DL (ref 0.1–1)
BUN SERPL-MCNC: 15 MG/DL (ref 8–23)
CALCIUM SERPL-MCNC: 9.3 MG/DL (ref 8.7–10.5)
CHLORIDE SERPL-SCNC: 102 MMOL/L (ref 95–110)
CO2 SERPL-SCNC: 22 MMOL/L (ref 23–29)
CREAT SERPL-MCNC: 0.9 MG/DL (ref 0.5–1.4)
DIFFERENTIAL METHOD BLD: ABNORMAL
EOSINOPHIL # BLD AUTO: 0.1 K/UL (ref 0–0.5)
EOSINOPHIL NFR BLD: 1.2 % (ref 0–8)
ERYTHROCYTE [DISTWIDTH] IN BLOOD BY AUTOMATED COUNT: 13.6 % (ref 11.5–14.5)
EST. GFR  (NO RACE VARIABLE): >60 ML/MIN/1.73 M^2
GLUCOSE SERPL-MCNC: 145 MG/DL (ref 70–110)
HCT VFR BLD AUTO: 39.9 % (ref 37–48.5)
HGB BLD-MCNC: 13 G/DL (ref 12–16)
IMM GRANULOCYTES # BLD AUTO: 0.04 K/UL (ref 0–0.04)
IMM GRANULOCYTES NFR BLD AUTO: 0.5 % (ref 0–0.5)
LYMPHOCYTES # BLD AUTO: 1.4 K/UL (ref 1–4.8)
LYMPHOCYTES NFR BLD: 17.6 % (ref 18–48)
MCH RBC QN AUTO: 33.3 PG (ref 27–31)
MCHC RBC AUTO-ENTMCNC: 32.6 G/DL (ref 32–36)
MCV RBC AUTO: 102 FL (ref 82–98)
MONOCYTES # BLD AUTO: 0.7 K/UL (ref 0.3–1)
MONOCYTES NFR BLD: 9.2 % (ref 4–15)
NEUTROPHILS # BLD AUTO: 5.5 K/UL (ref 1.8–7.7)
NEUTROPHILS NFR BLD: 70.7 % (ref 38–73)
NRBC BLD-RTO: 0 /100 WBC
PLATELET # BLD AUTO: 303 K/UL (ref 150–450)
PMV BLD AUTO: 9.1 FL (ref 9.2–12.9)
POTASSIUM SERPL-SCNC: 4.6 MMOL/L (ref 3.5–5.1)
PROT SERPL-MCNC: 6.8 G/DL (ref 6–8.4)
RBC # BLD AUTO: 3.9 M/UL (ref 4–5.4)
SODIUM SERPL-SCNC: 136 MMOL/L (ref 136–145)
WBC # BLD AUTO: 7.72 K/UL (ref 3.9–12.7)

## 2024-11-14 PROCEDURE — 36415 COLL VENOUS BLD VENIPUNCTURE: CPT | Mod: PN | Performed by: STUDENT IN AN ORGANIZED HEALTH CARE EDUCATION/TRAINING PROGRAM

## 2024-11-14 PROCEDURE — 85025 COMPLETE CBC W/AUTO DIFF WBC: CPT | Mod: PN | Performed by: STUDENT IN AN ORGANIZED HEALTH CARE EDUCATION/TRAINING PROGRAM

## 2024-11-14 PROCEDURE — 99213 OFFICE O/P EST LOW 20 MIN: CPT | Mod: PBBFAC,PN

## 2024-11-14 PROCEDURE — 99999 PR PBB SHADOW E&M-EST. PATIENT-LVL III: CPT | Mod: PBBFAC,,,

## 2024-11-14 PROCEDURE — 80053 COMPREHEN METABOLIC PANEL: CPT | Mod: PN | Performed by: STUDENT IN AN ORGANIZED HEALTH CARE EDUCATION/TRAINING PROGRAM

## 2024-11-14 NOTE — PROGRESS NOTES
PATIENT: Anabell Madrid  MRN: [id number]  DATE: 11/14/2024      Diagnosis:   1. Marginal zone B-cell lymphoma    2. Pulmonary lesion    3. History of non-Hodgkin's lymphoma    4. Paroxysmal atrial fibrillation    5. NSTEMI (non-ST elevated myocardial infarction)    6. Granulocytosis    7. Hot flashes        Chief Complaint: Follow-up (2mth Fu/)          Subjective:    Interval History: Ms. Madrid is a 84 y.o. female who returns for follow up for review of labs. Pt reports continued hot flashes that are improved with estrogen. She is being followed by cardiology and gynecology for management. She reports feeling well overall, staying active. Denies fever, chills, sob, cp, palpitations, swelling, fatigue, numbness, tingling, n/v, diarrhea, constipation, abdominal pain, new bumps, lumps, bleeding, bruising, drenching night sweats, weight loss.     Oncologic History:   Per Record:   Marginal Zone Lymphoma  2009: Bendamustine / Rituxan and Rituxan maintenance x2y     HPI: Anabell Madrid is a 84 y.o. female with history of marginal zone lymphoma who presents for follow-up. Patient is dealing with hot flashes more frequently, is taking estrogen. Also with night sweats. Symptoms started >1 yr prior, no fevers, stable weight. No new swelling or lymphadenopathy. Patient reports infrequent cough.  No recent steroids    Oncology History    No history exists.       Past Medical History:   Past Medical History:   Diagnosis Date    Sleep apnea     uses dental mouthpiece, no cpap/bipap       Past Surgical HIstory:   Past Surgical History:   Procedure Laterality Date    BONE MARROW ASPIRATION Bilateral 12/2/2019    Procedure: ASPIRATION, BONE MARROW;  Surgeon: Guevara Fuller MD;  Location: [facility] OR;  Service: Oncology;  Laterality: Bilateral;    COLONOSCOPY  ~2004    normal findings per patient report    colonoscopy  01/02/2020        COLONOSCOPY N/A 1/2/2020    Procedure: COLONOSCOPY;  Surgeon: Zander PEÑA  MD Pennie;  Location: [facility] ENDO;  Service: Endoscopy;  Laterality: N/A;    COLONOSCOPY N/A 6/26/2020    Procedure: COLONOSCOPY;  Surgeon: Lenin Moreno MD;  Location: [facility] ENDO;  Service: Endoscopy;  Laterality: N/A;    DILATION AND CURETTAGE OF UTERUS      ESOPHAGOGASTRODUODENOSCOPY  01/02/2020        ESOPHAGOGASTRODUODENOSCOPY N/A 1/2/2020    Procedure: EGD (ESOPHAGOGASTRODUODENOSCOPY);  Surgeon: Zander Casper MD;  Location: [facility] ENDO;  Service: Endoscopy;  Laterality: N/A;    TONSILLECTOMY         Family History:   Family History   Problem Relation Name Age of Onset    Diabetes Mother      Heart failure Mother      Cancer Mother          lymphoma non-hodkins    Heart attack Father      Diabetes Brother      Cancer Maternal Aunt          bladder cancer    Cancer Maternal Uncle      Diabetes Maternal Uncle      Diabetes Maternal Grandmother      Cancer Maternal Grandfather          prostate cancer    Colon cancer Neg Hx      Crohn's disease Neg Hx      Esophageal cancer Neg Hx      Stomach cancer Neg Hx      Ulcerative colitis Neg Hx         Social History:  reports that she has never smoked. She has never used smokeless tobacco. She reports current alcohol use. She reports current drug use. Drug: Marijuana.    Allergies:  Review of patient's allergies indicates:   Allergen Reactions    Pollen extracts Other (See Comments)       Medications:  Current Outpatient Medications   Medication Sig Dispense Refill    alendronate (FOSAMAX) 70 MG tablet Take 1 tablet (70 mg total) by mouth every 7 days. 12 tablet 4    aspirin 81 MG Chew Take 81 mg by mouth.      atorvastatin (LIPITOR) 40 MG tablet Take 1 tablet (40 mg total) by mouth nightly. 30 tablet 5    calcium 500 mg Tab Take 1,000 mg by mouth. Takes 2 tablets daily (1000mg)      cetirizine (ZYRTEC) 10 MG tablet Take 10 mg by mouth.      coQ10, ubiquinol, 100 mg Cap Take by mouth.      daridorexant (QUVIVIQ) 50 mg Tab Take 50 mg by mouth  every evening.      diclofenac sodium (VOLTAREN) 1 % Gel APPLY 2 GRAMS TOPICALLY 2 (TWO) TIMES DAILY AS NEEDED (LEFT HIP PAIN) 100 g 1    ELIQUIS 2.5 mg Tab TAKE 1 TABLET BY MOUTH TWICE A DAY 60 tablet 0    estradiol 0.05 mg/24 hr td ptsw (VIVELLE-DOT) 0.05 mg/24 hr Place 1 patch onto the skin twice a week.      fluticasone propionate (FLONASE) 50 mcg/actuation nasal spray 1 spray (50 mcg total) by Each Nostril route 2 (two) times daily as needed for Rhinitis. 48 mL 2    glucosamine sulfate 500 mg Tab Take by mouth.      HYDROcodone-acetaminophen (NORCO)  mg per tablet Take 1 tablet by mouth every 6 (six) hours as needed for Pain. As needed.      mecobalamin-levomefolate calc (EB-P1 DR) 0.4 mg- 15 mg CpDR Take 1 capsule by mouth once daily. 90 capsule 4    melatonin 10 mg Cap Take 1 capsule by mouth every evening.      metoprolol succinate (TOPROL-XL) 25 MG 24 hr tablet Take 1 tablet (25 mg total) by mouth 2 (two) times daily. 60 tablet 5    mv-min/folic/vit K/lut/eail553 (ALIVE WOMEN'S 50 PLUS, BLEND, ORAL) Take by mouth.      naloxone (NARCAN) 4 mg/actuation Spry       omega 3-dha-epa-fish oil (FISH OIL) 1,000 mg (120 mg-180 mg) Cap Take 1 capsule by mouth once daily. 90 capsule 4    pantoprazole (PROTONIX) 40 MG tablet Take 1 tablet (40 mg total) by mouth once daily. 90 tablet 3    paroxetine (PAXIL) 20 MG tablet Take 1 tablet (20 mg total) by mouth every morning. 90 tablet 4    progesterone (PROMETRIUM) 100 MG capsule Take 100 mg by mouth every evening.      sodium chloride 3% 3 % nebulizer solution Take 4 mLs by nebulization 2 (two) times a day. 240 mL 5    tiZANidine (ZANAFLEX) 2 MG tablet Take 2 mg by mouth.      TURMERIC ORAL Take 30 mg by mouth once daily.      UNABLE TO FIND Medical Marijuana $ CBD oil 900 mg tid      vitamin D 1000 units Tab Take 185 mg by mouth once daily.      tiZANidine (ZANAFLEX) 4 MG tablet Take 2 mg by mouth Daily. (Patient not taking: Reported on 11/11/2024)       No current  "facility-administered medications for this visit.       Review of Systems   Constitutional:  Negative for chills, fatigue and fever.   HENT: Negative.     Respiratory:  Negative for shortness of breath.    Cardiovascular:  Negative for chest pain and palpitations.   Gastrointestinal: Negative.    Genitourinary: Negative.    Musculoskeletal: Negative.    Neurological: Negative.    Hematological: Negative.    Psychiatric/Behavioral: Negative.         ECOG Performance Status:   ECOG SCORE             Objective:      Vitals:   Vitals:    11/14/24 1335   BP: 123/62   BP Location: Right arm   Patient Position: Sitting   Pulse: 65   Resp: 16   Temp: 98 °F (36.7 °C)   TempSrc: Temporal   SpO2: 96%   Weight: 46.7 kg (102 lb 15.3 oz)   Height: 4' 11" (1.499 m)     BMI: Body mass index is 20.79 kg/m².    Physical Exam  HENT:      Head: Normocephalic.      Nose: Nose normal.      Mouth/Throat:      Mouth: Mucous membranes are moist.      Pharynx: Oropharynx is clear.   Eyes:      Pupils: Pupils are equal, round, and reactive to light.   Cardiovascular:      Rate and Rhythm: Normal rate and regular rhythm.      Heart sounds: Normal heart sounds.   Pulmonary:      Effort: Pulmonary effort is normal.      Breath sounds: Normal breath sounds.   Abdominal:      General: Bowel sounds are normal.   Musculoskeletal:         General: Normal range of motion.      Cervical back: Normal range of motion.   Skin:     General: Skin is warm and dry.   Neurological:      Mental Status: She is alert and oriented to person, place, and time.   Psychiatric:         Mood and Affect: Mood normal.         Behavior: Behavior normal.         Laboratory Data:  Recent Results (from the past 24 hours)   CBC auto differential    Collection Time: 11/14/24  1:05 PM   Result Value Ref Range    WBC 7.72 3.90 - 12.70 K/uL    RBC 3.90 (L) 4.00 - 5.40 M/uL    Hemoglobin 13.0 12.0 - 16.0 g/dL    Hematocrit 39.9 37.0 - 48.5 %     (H) 82 - 98 fL    MCH 33.3 " (H) 27.0 - 31.0 pg    MCHC 32.6 32.0 - 36.0 g/dL    RDW 13.6 11.5 - 14.5 %    Platelets 303 150 - 450 K/uL    MPV 9.1 (L) 9.2 - 12.9 fL    Immature Granulocytes 0.5 0.0 - 0.5 %    Gran # (ANC) 5.5 1.8 - 7.7 K/uL    Immature Grans (Abs) 0.04 0.00 - 0.04 K/uL    Lymph # 1.4 1.0 - 4.8 K/uL    Mono # 0.7 0.3 - 1.0 K/uL    Eos # 0.1 0.0 - 0.5 K/uL    Baso # 0.06 0.00 - 0.20 K/uL    nRBC 0 0 /100 WBC    Gran % 70.7 38.0 - 73.0 %    Lymph % 17.6 (L) 18.0 - 48.0 %    Mono % 9.2 4.0 - 15.0 %    Eosinophil % 1.2 0.0 - 8.0 %    Basophil % 0.8 0.0 - 1.9 %    Differential Method Automated    CMP    Collection Time: 11/14/24  1:05 PM   Result Value Ref Range    Sodium 136 136 - 145 mmol/L    Potassium 4.6 3.5 - 5.1 mmol/L    Chloride 102 95 - 110 mmol/L    CO2 22 (L) 23 - 29 mmol/L    Glucose 145 (H) 70 - 110 mg/dL    BUN 15 8 - 23 mg/dL    Creatinine 0.9 0.5 - 1.4 mg/dL    Calcium 9.3 8.7 - 10.5 mg/dL    Total Protein 6.8 6.0 - 8.4 g/dL    Albumin 3.9 3.5 - 5.2 g/dL    Total Bilirubin 0.5 0.1 - 1.0 mg/dL    Alkaline Phosphatase 47 40 - 150 U/L    AST 19 10 - 40 U/L    ALT 15 10 - 44 U/L    eGFR >60.0 >60 mL/min/1.73 m^2    Anion Gap 12 8 - 16 mmol/L          Imaging: CT Chest With Contrast  Narrative: EXAMINATION:  CT CHEST WITH CONTRAST     CLINICAL HISTORY:  Lung nodule, > 8mm;Other disorders of lung     TECHNIQUE:  Low dose axial images, sagittal and coronal reformations were obtained from the thoracic inlet to the lung bases following the IV administration of 75 mL of Omnipaque 350.     COMPARISON:  PET-CT 08/15/2024; CTs dated 08/06/2024 and 07/15/2022     FINDINGS:  There is scattered reticulonodular opacities in the right lung.  A reference nodule in the right middle lobe lateral segment is 3 mm series 4, image 267.  A reference nodule in the peripheral right lower lobe is 3 mm series 4, image 343.  There are scattered reticulonodular opacities throughout the left lung.  A reference lingular nodule is 11 mm series 4 image  292 and another reference nodule in the left lower lobe is 6 mm series 4, image 280.  No filling defects central airways.     No pleural effusion or pneumothorax.  Normal size heart.  Prior splenic granulomatous disease.  Superior endplate L1 compression fracture with associated sclerosis, which is subacute to remote in age.  Impression: Diffuse reticulonodular opacities in the lungs with some improvement relative to recent priors.  A few reference nodules are provided, some which are unchanged and some which are decreased in size.  Atypical infection is suspected.  Recommend continued imaging follow-up in light of neoplasm history.     Electronically signed by:Joe Condon  Date:                                            09/24/2024   Assessment:       1. Marginal zone B-cell lymphoma    2. Pulmonary lesion    3. History of non-Hodgkin's lymphoma    4. Paroxysmal atrial fibrillation    5. NSTEMI (non-ST elevated myocardial infarction)    6. Granulocytosis    7. Hot flashes           Plan:      Marginal Zone Lymphoma - Patient presents 9 years following definitive therapy for non-Hodgkin's lymphoma. Patient is unsure of disease location, but per documentation there was bone marrow involvement. We discussed the low likelihood of recurrence 15 years post treatment and lack of data to support continued imaging studies.     PET scan 8/2024 was taken within 2 weeks of significant aspiration event and hospitalization for pneumonia complicated by NSTEMI and a fib. Multifocal findings most likely related to aspiration, however left lower lobe lesion with SUV 7.3 (1.2 cm) raises concern. Requisition for [facility]A from [facility] and tumor board review, lesions felt to be most consistent with recent PNA.     Repeat CT Chest shows stable / improving lesions. No need for continued imaging surveillance of marginal zone lymphoma unless concerning symptoms arise.     11/14/24: Doing well clinically, denies B symptoms.      Visit today included increased complexity associated with the care of the episodic problem  addressed and managing the longitudinal care of the patient due to the serious and/or complex managed problem(s) MZL.      # Pulmonary Lesion - 1.2 cm lesion in LLL seen on PET with 7.3 avidity, notes smoking exposure history. Family has recognized dry cough at night. CTA 8/2023 unremarkable for mass, repeat shows resolving lesions.   - Followed by pulmonology     # A Fib # NSTEMI - Follows with cardiology     # Granulocytosis  - no recent steroids, suspect due inflammation. Resolved on recent CBC.       # Hot flashes - estradiol patches with gynecology, encourage fu       Med Onc Chart Routing      Follow up with physician    Follow up with DENNIS 6 months. with repeat cbc, cmp   Infusion scheduling note    Injection scheduling note    Labs    Imaging    Pharmacy appointment    Other referrals                  Plan was discussed with the patient at length, and she verbalized understanding. Anabell was given an opportunity to ask questions that were answered to her satisfaction, and she was advised to call in the interval if any problems or questions arise.    Assessment/Plan reviewed and approved by Dr Denton    30 minutes were spent in coordination of patient's care, record review and counseling.    OSBALDO Francois, FNP-C  Hematology & Oncology

## 2025-01-14 PROBLEM — I70.0 AORTIC ATHEROSCLEROSIS: Status: ACTIVE | Noted: 2025-01-14

## 2025-01-14 PROBLEM — Z00.00 ANNUAL WELLNESS VISIT: Status: ACTIVE | Noted: 2025-01-14

## 2025-05-07 DIAGNOSIS — M79.672 LEFT FOOT PAIN: Primary | ICD-10-CM

## 2025-05-13 NOTE — PROGRESS NOTES
PATIENT: Anabell Madrid  MRN: [id number]  DATE: 5/14/2025      Diagnosis:   1. Marginal zone B-cell lymphoma    2. Pulmonary lesion    3. History of non-Hodgkin's lymphoma    4. Paroxysmal atrial fibrillation    5. NSTEMI (non-ST elevated myocardial infarction)    6. Granulocytosis    7. Hot flashes        Chief Complaint: Follow-up ( 6mths FU with Labs/)          Subjective:    Interval History: Ms. Madrid is a 85 y.o. female who returns for follow up for review of labs. Pt reports continued hot flashes that are improved with estrogen. Followed by cardiology for afib.  She fractured her left foot last week and is currently wearing a boot. She reports feeling well overall, staying active. Denies fever, chills, sob, cp, palpitations, swelling, fatigue, numbness, tingling, n/v, diarrhea, constipation, abdominal pain, new bumps, lumps, bleeding, bruising, drenching night sweats, weight loss.     Oncologic History:   Per Record:   Marginal Zone Lymphoma  2009: Bendamustine / Rituxan and Rituxan maintenance x2y     HPI: Anabell Madrid is a 84 y.o. female with history of marginal zone lymphoma who presents for follow-up. Patient is dealing with hot flashes more frequently, is taking estrogen. Also with night sweats. Symptoms started >1 yr prior, no fevers, stable weight. No new swelling or lymphadenopathy. Patient reports infrequent cough.  No recent steroids    Oncology History    No history exists.       Past Medical History:   Past Medical History:   Diagnosis Date    Sleep apnea     uses dental mouthpiece, no cpap/bipap       Past Surgical HIstory:   Past Surgical History:   Procedure Laterality Date    BONE MARROW ASPIRATION Bilateral 12/2/2019    Procedure: ASPIRATION, BONE MARROW;  Surgeon: Guevara Fuller MD;  Location: [facility] OR;  Service: Oncology;  Laterality: Bilateral;    COLONOSCOPY  ~2004    normal findings per patient report    colonoscopy  01/02/2020        COLONOSCOPY N/A 1/2/2020     Procedure: COLONOSCOPY;  Surgeon: Zander Casper MD;  Location: [facility] ENDO;  Service: Endoscopy;  Laterality: N/A;    COLONOSCOPY N/A 6/26/2020    Procedure: COLONOSCOPY;  Surgeon: Lenin Moreno MD;  Location: [facility] ENDO;  Service: Endoscopy;  Laterality: N/A;    DILATION AND CURETTAGE OF UTERUS      ESOPHAGOGASTRODUODENOSCOPY  01/02/2020        ESOPHAGOGASTRODUODENOSCOPY N/A 1/2/2020    Procedure: EGD (ESOPHAGOGASTRODUODENOSCOPY);  Surgeon: Zander Casper MD;  Location: [facility] ENDO;  Service: Endoscopy;  Laterality: N/A;    TONSILLECTOMY         Family History:   Family History   Problem Relation Name Age of Onset    Diabetes Mother      Heart failure Mother      Cancer Mother          lymphoma non-hodkins    Heart attack Father      Diabetes Brother      Cancer Maternal Aunt          bladder cancer    Cancer Maternal Uncle      Diabetes Maternal Uncle      Diabetes Maternal Grandmother      Cancer Maternal Grandfather          prostate cancer    Colon cancer Neg Hx      Crohn's disease Neg Hx      Esophageal cancer Neg Hx      Stomach cancer Neg Hx      Ulcerative colitis Neg Hx         Social History:  reports that she has never smoked. She has never used smokeless tobacco. She reports current alcohol use. She reports current drug use. Drug: Marijuana.    Allergies:  Review of patient's allergies indicates:   Allergen Reactions    Pollen extracts Other (See Comments)       Medications:  Current Outpatient Medications   Medication Sig Dispense Refill    alendronate (FOSAMAX) 70 MG tablet TAKE 1 TABLET (70 MG TOTAL) BY MOUTH EVERY 7 DAYS 12 tablet 4    atorvastatin (LIPITOR) 40 MG tablet TAKE 1 TABLET BY MOUTH EVERY DAY AT NIGHT 90 tablet 3    calcium 500 mg Tab Take 1,000 mg by mouth. Takes 2 tablets daily (1000mg)      coQ10, ubiquinol, 100 mg Cap Take by mouth.      daridorexant (QUVIVIQ) 50 mg Tab Take 50 mg by mouth every evening.      diclofenac sodium (VOLTAREN) 1 % Gel APPLY 2  GRAMS TOPICALLY 2 (TWO) TIMES DAILY AS NEEDED (LEFT HIP PAIN) 100 g 1    ELIQUIS 2.5 mg Tab TAKE 1 TABLET BY MOUTH TWICE A DAY 60 tablet 2    estradioL (VIVELLE-DOT) 0.075 mg/24 hr Place 1 patch onto the skin twice a week.      glucosamine sulfate 500 mg Tab Take by mouth.      mecobalamin-levomefolate calc (EB-P1 DR) 0.4 mg- 15 mg CpDR Take 1 capsule by mouth once daily. 90 capsule 4    melatonin 10 mg Cap Take 1 capsule by mouth every evening.      metoprolol succinate (TOPROL-XL) 25 MG 24 hr tablet Take 1 tablet (25 mg total) by mouth 2 (two) times daily. 180 tablet 4    mv-min/folic/vit [id number] (ALIVE WOMEN'S 50 PLUS, BLEND, ORAL) Take by mouth.      naloxone (NARCAN) 4 mg/actuation Spry       omega 3-dha-epa-fish oil (FISH OIL) 1,000 mg (120 mg-180 mg) Cap Take 1 capsule by mouth once daily. 90 capsule 4    pantoprazole (PROTONIX) 40 MG tablet Take 1 tablet (40 mg total) by mouth once daily. 90 tablet 3    paroxetine (PAXIL) 20 MG tablet Take 1 tablet (20 mg total) by mouth every morning. 90 tablet 4    progesterone (PROMETRIUM) 100 MG capsule Take 100 mg by mouth every evening.      sodium chloride 3% 3 % nebulizer solution Take 4 mLs by nebulization 2 (two) times a day. 240 mL 5    tiZANidine (ZANAFLEX) 2 MG tablet Take 2 mg by mouth every evening.      UNABLE TO FIND Medical Marijuana $ CBD oil 900 mg tid      vitamin D 1000 units Tab Take 185 mg by mouth once daily.       No current facility-administered medications for this visit.       Review of Systems   Constitutional:  Negative for appetite change, chills, fatigue, fever and unexpected weight change.   HENT: Negative.  Negative for mouth sores.    Eyes:  Negative for visual disturbance.   Respiratory:  Positive for cough. Negative for shortness of breath.    Cardiovascular:  Negative for chest pain and palpitations.   Gastrointestinal: Negative.  Negative for abdominal pain, constipation and diarrhea.   Genitourinary: Negative.  Negative for  "frequency.   Musculoskeletal:  Positive for back pain.   Skin:  Negative for rash.   Neurological: Negative.  Negative for headaches.   Hematological: Negative.  Negative for adenopathy.   Psychiatric/Behavioral: Negative.  The patient is not nervous/anxious.        ECOG Performance Status:   ECOG SCORE             Objective:      Vitals:   Vitals:    05/14/25 1302   BP: 134/70   BP Location: Left arm   Patient Position: Sitting   Pulse: 73   Resp: 18   Temp: 98 °F (36.7 °C)   TempSrc: Temporal   SpO2: 95%   Weight: 46.9 kg (103 lb 6.3 oz)   Height: 4' 11" (1.499 m)       BMI: Body mass index is 20.88 kg/m².    Physical Exam  HENT:      Head: Normocephalic.      Nose: Nose normal.      Mouth/Throat:      Mouth: Mucous membranes are moist.      Pharynx: Oropharynx is clear.   Eyes:      Pupils: Pupils are equal, round, and reactive to light.   Cardiovascular:      Rate and Rhythm: Normal rate and regular rhythm.      Heart sounds: Normal heart sounds.   Pulmonary:      Effort: Pulmonary effort is normal.      Breath sounds: Normal breath sounds.   Abdominal:      General: Bowel sounds are normal.   Musculoskeletal:         General: Normal range of motion.      Cervical back: Normal range of motion.   Skin:     General: Skin is warm and dry.   Neurological:      Mental Status: She is alert and oriented to person, place, and time.   Psychiatric:         Mood and Affect: Mood normal.         Behavior: Behavior normal.         Laboratory Data:  Recent Results (from the past 24 hours)   CMP    Collection Time: 05/14/25 12:17 PM   Result Value Ref Range    Sodium 136 136 - 145 mmol/L    Potassium 5.0 3.5 - 5.1 mmol/L    Chloride 102 95 - 110 mmol/L    CO2 23 23 - 29 mmol/L    Glucose 98 70 - 110 mg/dL    BUN 17 8 - 23 mg/dL    Creatinine 0.8 0.5 - 1.4 mg/dL    Calcium 9.8 8.7 - 10.5 mg/dL    Protein Total 7.1 6.0 - 8.4 gm/dL    Albumin 3.9 3.5 - 5.2 g/dL    Bilirubin Total 0.4 0.1 - 1.0 mg/dL    ALP 52 40 - 150 unit/L    " AST 21 11 - 45 unit/L    ALT 15 10 - 44 unit/L    Anion Gap 11 8 - 16 mmol/L    eGFR >60 >60 mL/min/1.73/m2   CBC with Differential    Collection Time: 05/14/25 12:17 PM   Result Value Ref Range    WBC 8.25 3.90 - 12.70 K/uL    RBC 3.75 (L) 4.00 - 5.40 M/uL    HGB 12.5 12.0 - 16.0 gm/dL    HCT 36.4 (L) 37.0 - 48.5 %    MCV 97 82 - 98 fL    MCH 33.3 (H) 27.0 - 31.0 pg    MCHC 34.3 32.0 - 36.0 g/dL    RDW 13.4 11.5 - 14.5 %    Platelet Count 341 150 - 450 K/uL    MPV 9.0 (L) 9.2 - 12.9 fL    Nucleated RBC 0 <=0 /100 WBC    Neut % 69.0 38 - 73 %    Lymph % 17.3 (L) 18 - 48 %    Mono % 11.2 4 - 15 %    Eos % 1.2 <=8 %    Basophil % 0.7 <=1.9 %    Imm Grans % 0.6 (H) 0.0 - 0.5 %    Neut # 5.69 1.8 - 7.7 K/uL    Lymph # 1.43 1 - 4.8 K/uL    Mono # 0.92 0.3 - 1 K/uL    Eos # 0.10 <=0.5 K/uL    Baso # 0.06 <=0.2 K/uL    Imm Grans # 0.05 (H) 0.00 - 0.04 K/uL            Imaging: CT Chest With Contrast  Narrative: EXAMINATION:  CT CHEST WITH CONTRAST     CLINICAL HISTORY:  Lung nodule, > 8mm;Other disorders of lung     TECHNIQUE:  Low dose axial images, sagittal and coronal reformations were obtained from the thoracic inlet to the lung bases following the IV administration of 75 mL of Omnipaque 350.     COMPARISON:  PET-CT 08/15/2024; CTs dated 08/06/2024 and 07/15/2022     FINDINGS:  There is scattered reticulonodular opacities in the right lung.  A reference nodule in the right middle lobe lateral segment is 3 mm series 4, image 267.  A reference nodule in the peripheral right lower lobe is 3 mm series 4, image 343.  There are scattered reticulonodular opacities throughout the left lung.  A reference lingular nodule is 11 mm series 4 image 292 and another reference nodule in the left lower lobe is 6 mm series 4, image 280.  No filling defects central airways.     No pleural effusion or pneumothorax.  Normal size heart.  Prior splenic granulomatous disease.  Superior endplate L1 compression fracture with associated sclerosis,  which is subacute to remote in age.  Impression: Diffuse reticulonodular opacities in the lungs with some improvement relative to recent priors.  A few reference nodules are provided, some which are unchanged and some which are decreased in size.  Atypical infection is suspected.  Recommend continued imaging follow-up in light of neoplasm history.     Electronically signed by:Joe Condon  Date:                                            09/24/2024   Assessment:       1. Marginal zone B-cell lymphoma    2. Pulmonary lesion    3. History of non-Hodgkin's lymphoma    4. Paroxysmal atrial fibrillation    5. NSTEMI (non-ST elevated myocardial infarction)    6. Granulocytosis    7. Hot flashes           Plan:      Marginal Zone Lymphoma - Patient presents 9 years following definitive therapy for non-Hodgkin's lymphoma. Patient is unsure of disease location, but per documentation there was bone marrow involvement. We discussed the low likelihood of recurrence 15 years post treatment and lack of data to support continued imaging studies.     PET scan 8/2024 was taken within 2 weeks of significant aspiration event and hospitalization for pneumonia complicated by NSTEMI and a fib. Multifocal findings most likely related to aspiration, however left lower lobe lesion with SUV 7.3 (1.2 cm) raises concern. Requisition for CTA from [facility] and tumor board review, lesions felt to be most consistent with recent PNA.     Repeat CT Chest shows stable / improving lesions. No need for continued imaging surveillance of marginal zone lymphoma unless concerning symptoms arise.     5/14/25: Doing well clinically, denies B symptoms. DEION    Visit today included increased complexity associated with the care of the episodic problem  addressed and managing the longitudinal care of the patient due to the serious and/or complex managed problem(s) MZL.      # Pulmonary Lesion - 1.2 cm lesion in LLL seen on PET with 7.3  avidity, notes smoking exposure history. Family has recognized dry cough at night. CTA 8/2023 unremarkable for mass, repeat shows resolving lesions.   - Followed by pulmonology     # A Fib # NSTEMI - Follows with cardiology     # Granulocytosis  - no recent steroids, suspect due inflammation. Resolved on recent CBC.       # Hot flashes - estradiol patches with gynecology, encourage fu       Med Onc Chart Routing      Follow up with physician    Follow up with DENNIS 1 year. With repeat cbc, cmp   Infusion scheduling note    Injection scheduling note    Labs    Imaging    Pharmacy appointment    Other referrals                Plan was discussed with the patient at length, and she verbalized understanding. Anabell was given an opportunity to ask questions that were answered to her satisfaction, and she was advised to call in the interval if any problems or questions arise.    Assessment/Plan reviewed and approved by Dr Denton    38 minutes were spent in coordination of patient's care, record review and counseling.    OSBALDO Francois, FNP-C  Hematology & Oncology

## 2025-05-14 ENCOUNTER — OFFICE VISIT (OUTPATIENT)
Dept: HEMATOLOGY/ONCOLOGY | Facility: CLINIC | Age: 85
End: 2025-05-14
Payer: MEDICARE

## 2025-05-14 ENCOUNTER — LAB VISIT (OUTPATIENT)
Dept: LAB | Facility: HOSPITAL | Age: 85
End: 2025-05-14
Payer: MEDICARE

## 2025-05-14 VITALS
TEMPERATURE: 98 F | HEIGHT: 59 IN | DIASTOLIC BLOOD PRESSURE: 70 MMHG | BODY MASS INDEX: 20.84 KG/M2 | WEIGHT: 103.38 LBS | HEART RATE: 73 BPM | SYSTOLIC BLOOD PRESSURE: 134 MMHG | OXYGEN SATURATION: 95 % | RESPIRATION RATE: 18 BRPM

## 2025-05-14 DIAGNOSIS — I48.0 PAROXYSMAL ATRIAL FIBRILLATION: ICD-10-CM

## 2025-05-14 DIAGNOSIS — C85.80 MARGINAL ZONE B-CELL LYMPHOMA: ICD-10-CM

## 2025-05-14 DIAGNOSIS — C85.80 MARGINAL ZONE B-CELL LYMPHOMA: Primary | ICD-10-CM

## 2025-05-14 DIAGNOSIS — J98.4 PULMONARY LESION: ICD-10-CM

## 2025-05-14 DIAGNOSIS — Z85.72 HISTORY OF NON-HODGKIN'S LYMPHOMA: ICD-10-CM

## 2025-05-14 DIAGNOSIS — R23.2 HOT FLASHES: ICD-10-CM

## 2025-05-14 DIAGNOSIS — D72.828 GRANULOCYTOSIS: ICD-10-CM

## 2025-05-14 DIAGNOSIS — I21.4 NSTEMI (NON-ST ELEVATED MYOCARDIAL INFARCTION): ICD-10-CM

## 2025-05-14 LAB
ABSOLUTE EOSINOPHIL (OHS): 0.1 K/UL
ABSOLUTE MONOCYTE (OHS): 0.92 K/UL (ref 0.3–1)
ABSOLUTE NEUTROPHIL COUNT (OHS): 5.69 K/UL (ref 1.8–7.7)
ALBUMIN SERPL BCP-MCNC: 3.9 G/DL (ref 3.5–5.2)
ALP SERPL-CCNC: 52 UNIT/L (ref 40–150)
ALT SERPL W/O P-5'-P-CCNC: 15 UNIT/L (ref 10–44)
ANION GAP (OHS): 11 MMOL/L (ref 8–16)
AST SERPL-CCNC: 21 UNIT/L (ref 11–45)
BASOPHILS # BLD AUTO: 0.06 K/UL
BASOPHILS NFR BLD AUTO: 0.7 %
BILIRUB SERPL-MCNC: 0.4 MG/DL (ref 0.1–1)
BUN SERPL-MCNC: 17 MG/DL (ref 8–23)
CALCIUM SERPL-MCNC: 9.8 MG/DL (ref 8.7–10.5)
CHLORIDE SERPL-SCNC: 102 MMOL/L (ref 95–110)
CO2 SERPL-SCNC: 23 MMOL/L (ref 23–29)
CREAT SERPL-MCNC: 0.8 MG/DL (ref 0.5–1.4)
ERYTHROCYTE [DISTWIDTH] IN BLOOD BY AUTOMATED COUNT: 13.4 % (ref 11.5–14.5)
GFR SERPLBLD CREATININE-BSD FMLA CKD-EPI: >60 ML/MIN/1.73/M2
GLUCOSE SERPL-MCNC: 98 MG/DL (ref 70–110)
HCT VFR BLD AUTO: 36.4 % (ref 37–48.5)
HGB BLD-MCNC: 12.5 GM/DL (ref 12–16)
IMM GRANULOCYTES # BLD AUTO: 0.05 K/UL (ref 0–0.04)
IMM GRANULOCYTES NFR BLD AUTO: 0.6 % (ref 0–0.5)
LYMPHOCYTES # BLD AUTO: 1.43 K/UL (ref 1–4.8)
MCH RBC QN AUTO: 33.3 PG (ref 27–31)
MCHC RBC AUTO-ENTMCNC: 34.3 G/DL (ref 32–36)
MCV RBC AUTO: 97 FL (ref 82–98)
NUCLEATED RBC (/100WBC) (OHS): 0 /100 WBC
PLATELET # BLD AUTO: 341 K/UL (ref 150–450)
PMV BLD AUTO: 9 FL (ref 9.2–12.9)
POTASSIUM SERPL-SCNC: 5 MMOL/L (ref 3.5–5.1)
PROT SERPL-MCNC: 7.1 GM/DL (ref 6–8.4)
RBC # BLD AUTO: 3.75 M/UL (ref 4–5.4)
RELATIVE EOSINOPHIL (OHS): 1.2 %
RELATIVE LYMPHOCYTE (OHS): 17.3 % (ref 18–48)
RELATIVE MONOCYTE (OHS): 11.2 % (ref 4–15)
RELATIVE NEUTROPHIL (OHS): 69 % (ref 38–73)
SODIUM SERPL-SCNC: 136 MMOL/L (ref 136–145)
WBC # BLD AUTO: 8.25 K/UL (ref 3.9–12.7)

## 2025-05-14 PROCEDURE — 85025 COMPLETE CBC W/AUTO DIFF WBC: CPT | Mod: PN

## 2025-05-14 PROCEDURE — 99999 PR PBB SHADOW E&M-EST. PATIENT-LVL V: CPT | Mod: PBBFAC,,,

## 2025-05-14 PROCEDURE — 36415 COLL VENOUS BLD VENIPUNCTURE: CPT | Mod: PN

## 2025-05-14 PROCEDURE — 99215 OFFICE O/P EST HI 40 MIN: CPT | Mod: PBBFAC,PN

## 2025-05-14 PROCEDURE — 84460 ALANINE AMINO (ALT) (SGPT): CPT | Mod: PN

## 2025-05-20 ENCOUNTER — HOSPITAL ENCOUNTER (OUTPATIENT)
Dept: RADIOLOGY | Facility: HOSPITAL | Age: 85
Discharge: HOME | End: 2025-05-20
Attending: NURSE PRACTITIONER
Payer: MEDICARE

## 2025-05-20 DIAGNOSIS — R91.8 ABNORMAL CT SCAN OF LUNG: ICD-10-CM

## 2025-05-20 PROCEDURE — 71250 CT THORAX DX C-: CPT | Mod: TC,PO

## 2025-05-20 PROCEDURE — 71250 CT THORAX DX C-: CPT | Mod: 26,,, | Performed by: STUDENT IN AN ORGANIZED HEALTH CARE EDUCATION/TRAINING PROGRAM

## (undated) DEVICE — TRAY BONE MARROW BEK3411

## (undated) DEVICE — SYR SLIP TIP 10ML SHIELD

## (undated) DEVICE — GAUZE SPONGE 4X4 12PLY

## (undated) DEVICE — SEE MEDLINE ITEM 157128

## (undated) DEVICE — SEE MEDLINE ITEM 152622

## (undated) DEVICE — SYR SLIP TIP 20CC

## (undated) DEVICE — NDL BONE MAR JAMSHIDI 11G 4CM